# Patient Record
Sex: MALE | Race: WHITE
[De-identification: names, ages, dates, MRNs, and addresses within clinical notes are randomized per-mention and may not be internally consistent; named-entity substitution may affect disease eponyms.]

---

## 2017-09-17 ENCOUNTER — TRANSCRIPTION ENCOUNTER (OUTPATIENT)
Age: 27
End: 2017-09-17

## 2018-02-06 ENCOUNTER — INPATIENT (INPATIENT)
Facility: HOSPITAL | Age: 28
LOS: 1 days | Discharge: HOME | End: 2018-02-08
Attending: EMERGENCY MEDICINE | Admitting: EMERGENCY MEDICINE
Payer: COMMERCIAL

## 2018-02-06 VITALS
SYSTOLIC BLOOD PRESSURE: 181 MMHG | TEMPERATURE: 98 F | DIASTOLIC BLOOD PRESSURE: 114 MMHG | HEART RATE: 116 BPM | OXYGEN SATURATION: 100 % | RESPIRATION RATE: 17 BRPM | HEIGHT: 71 IN | WEIGHT: 184.97 LBS

## 2018-02-06 LAB
ALBUMIN SERPL ELPH-MCNC: 4.3 G/DL — SIGNIFICANT CHANGE UP (ref 3.3–5)
ALP SERPL-CCNC: 111 U/L — SIGNIFICANT CHANGE UP (ref 40–120)
ALT FLD-CCNC: 20 U/L — SIGNIFICANT CHANGE UP (ref 12–78)
ANION GAP SERPL CALC-SCNC: 12 MMOL/L — SIGNIFICANT CHANGE UP (ref 5–17)
APAP SERPL-MCNC: < 2 UG/ML (ref 10–30)
AST SERPL-CCNC: 16 U/L — SIGNIFICANT CHANGE UP (ref 15–37)
BASOPHILS # BLD AUTO: 0 K/UL — SIGNIFICANT CHANGE UP (ref 0–0.2)
BASOPHILS NFR BLD AUTO: 0.4 % — SIGNIFICANT CHANGE UP (ref 0–2)
BILIRUB SERPL-MCNC: 0.2 MG/DL — SIGNIFICANT CHANGE UP (ref 0.2–1.2)
BUN SERPL-MCNC: 12 MG/DL — SIGNIFICANT CHANGE UP (ref 7–23)
CALCIUM SERPL-MCNC: 9.2 MG/DL — SIGNIFICANT CHANGE UP (ref 8.5–10.1)
CHLORIDE SERPL-SCNC: 102 MMOL/L — SIGNIFICANT CHANGE UP (ref 96–108)
CO2 SERPL-SCNC: 23 MMOL/L — SIGNIFICANT CHANGE UP (ref 22–31)
CREAT SERPL-MCNC: 1.14 MG/DL — SIGNIFICANT CHANGE UP (ref 0.5–1.3)
EOSINOPHIL # BLD AUTO: 0 K/UL — SIGNIFICANT CHANGE UP (ref 0–0.5)
EOSINOPHIL NFR BLD AUTO: 0.4 % — SIGNIFICANT CHANGE UP (ref 0–6)
ETHANOL SERPL-MCNC: <10 MG/DL — SIGNIFICANT CHANGE UP (ref 0–10)
GLUCOSE SERPL-MCNC: 126 MG/DL — HIGH (ref 70–99)
HCT VFR BLD CALC: 41.4 % — SIGNIFICANT CHANGE UP (ref 39–50)
HGB BLD-MCNC: 14.3 G/DL — SIGNIFICANT CHANGE UP (ref 13–17)
LYMPHOCYTES # BLD AUTO: 1.9 K/UL — SIGNIFICANT CHANGE UP (ref 1–3.3)
LYMPHOCYTES # BLD AUTO: 14 % — SIGNIFICANT CHANGE UP (ref 13–44)
MCHC RBC-ENTMCNC: 30.5 PG — SIGNIFICANT CHANGE UP (ref 27–34)
MCHC RBC-ENTMCNC: 34.6 GM/DL — SIGNIFICANT CHANGE UP (ref 32–36)
MCV RBC AUTO: 88.1 FL — SIGNIFICANT CHANGE UP (ref 80–100)
MONOCYTES # BLD AUTO: 1 K/UL — HIGH (ref 0–0.9)
MONOCYTES NFR BLD AUTO: 7.6 % — SIGNIFICANT CHANGE UP (ref 2–14)
NEUTROPHILS # BLD AUTO: 10.8 K/UL — HIGH (ref 1.8–7.4)
NEUTROPHILS NFR BLD AUTO: 77.8 % — HIGH (ref 43–77)
PLATELET # BLD AUTO: 345 K/UL — SIGNIFICANT CHANGE UP (ref 150–400)
POTASSIUM SERPL-MCNC: 3.5 MMOL/L — SIGNIFICANT CHANGE UP (ref 3.5–5.3)
POTASSIUM SERPL-SCNC: 3.5 MMOL/L — SIGNIFICANT CHANGE UP (ref 3.5–5.3)
PROT SERPL-MCNC: 7.9 GM/DL — SIGNIFICANT CHANGE UP (ref 6–8.3)
RBC # BLD: 4.7 M/UL — SIGNIFICANT CHANGE UP (ref 4.2–5.8)
RBC # FLD: 11.2 % — SIGNIFICANT CHANGE UP (ref 10.3–14.5)
SALICYLATES SERPL-MCNC: <1.7 MG/DL (ref 2.8–20)
SODIUM SERPL-SCNC: 137 MMOL/L — SIGNIFICANT CHANGE UP (ref 135–145)
WBC # BLD: 13.8 K/UL — HIGH (ref 3.8–10.5)
WBC # FLD AUTO: 13.8 K/UL — HIGH (ref 3.8–10.5)

## 2018-02-06 PROCEDURE — 93010 ELECTROCARDIOGRAM REPORT: CPT | Mod: 76

## 2018-02-06 RX ORDER — METOCLOPRAMIDE HCL 10 MG
10 TABLET ORAL ONCE
Qty: 0 | Refills: 0 | Status: COMPLETED | OUTPATIENT
Start: 2018-02-06 | End: 2018-02-06

## 2018-02-06 RX ORDER — SODIUM CHLORIDE 9 MG/ML
1000 INJECTION INTRAMUSCULAR; INTRAVENOUS; SUBCUTANEOUS ONCE
Qty: 0 | Refills: 0 | Status: COMPLETED | OUTPATIENT
Start: 2018-02-06 | End: 2018-02-06

## 2018-02-06 RX ORDER — ONDANSETRON 8 MG/1
4 TABLET, FILM COATED ORAL ONCE
Qty: 0 | Refills: 0 | Status: COMPLETED | OUTPATIENT
Start: 2018-02-06 | End: 2018-02-06

## 2018-02-06 RX ADMIN — SODIUM CHLORIDE 1000 MILLILITER(S): 9 INJECTION INTRAMUSCULAR; INTRAVENOUS; SUBCUTANEOUS at 21:31

## 2018-02-06 RX ADMIN — ONDANSETRON 4 MILLIGRAM(S): 8 TABLET, FILM COATED ORAL at 20:15

## 2018-02-06 NOTE — ED PROVIDER NOTE - MEDICAL DECISION MAKING DETAILS
Pt presenting s/p ingesting too much psych medication. Will consult toxicology, labs, EKG and psych consult.

## 2018-02-06 NOTE — ED ADULT NURSE NOTE - OBJECTIVE STATEMENT
pt missed morning dose of wellbutrin and took both doses at noon. pt then started feeling lightheaded and dizzy, denies LOC, fall.

## 2018-02-06 NOTE — ED PROVIDER NOTE - PROGRESS NOTE DETAILS
Adrienne SP: Dr. Buchanan spoke with Cristal, she notes to observe pt for 6 hours and repeat EKG for QRS prolongation. Adrienne SP: Dr. Buchanan spoke with Judit, she notes to observe pt for 6 hours and repeat EKG for QRS prolongation. AJM: pts mother notes he now admits to taking larger amount of the same pills than initially described. No other ingestions. Spoke with tox who notes pt should be observed until no longer ataxic. likely several hours. case presented to tele psych. Pt states he took about 10 tabs of lamictal 150mg, 10 tabs of wellbutrin xl and "a bunch" of zoloft 50mg tabs. called toxicology, called telepsych with this update EBER Shaikh DO

## 2018-02-06 NOTE — ED ADULT TRIAGE NOTE - CHIEF COMPLAINT QUOTE
took double dose of his lamictal this afternoon because he missed his dose this morning. hx of bipolar and depression. denies SI/HI

## 2018-02-06 NOTE — ED PROVIDER NOTE - OBJECTIVE STATEMENT
26 y/o male with PMHx of seasonal bipolar, depression, retinoid detachment surgery presents to the ED s/p overdose at 5:30 pm on 2x 300mg buproprion XL and 2 x sertraline 100mg. As per pt he forgot to take his morning dose of medication, and figured he would just double up to make up for it. Pt's mother states pt's father  a few years ago and they have been going through a lot since then. Mother also claims pt has been more anxious than usual recently. 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  +lightheaded +Nausea. Pt has never taken too much medication before. Previous SI but none recently. Break up in 2017 that he still gets upset about. Denies CP, abd pain, VD,fever.

## 2018-02-06 NOTE — ED PROVIDER NOTE - NEUROLOGICAL, MLM
Alert and oriented, no focal deficits, no motor or sensory deficits. Alert and oriented, no focal deficits, no motor or sensory deficits. Cn 2-12 intact. ataxic gait

## 2018-02-07 LAB
AMPHET UR-MCNC: NEGATIVE — SIGNIFICANT CHANGE UP
ANION GAP SERPL CALC-SCNC: 8 MMOL/L — SIGNIFICANT CHANGE UP (ref 5–17)
APPEARANCE UR: CLEAR — SIGNIFICANT CHANGE UP
BARBITURATES UR SCN-MCNC: NEGATIVE — SIGNIFICANT CHANGE UP
BASOPHILS # BLD AUTO: 0.1 K/UL — SIGNIFICANT CHANGE UP (ref 0–0.2)
BASOPHILS NFR BLD AUTO: 0.5 % — SIGNIFICANT CHANGE UP (ref 0–2)
BENZODIAZ UR-MCNC: NEGATIVE — SIGNIFICANT CHANGE UP
BILIRUB UR-MCNC: NEGATIVE — SIGNIFICANT CHANGE UP
BUN SERPL-MCNC: 8 MG/DL — SIGNIFICANT CHANGE UP (ref 7–23)
CALCIUM SERPL-MCNC: 8.6 MG/DL — SIGNIFICANT CHANGE UP (ref 8.5–10.1)
CHLORIDE SERPL-SCNC: 98 MMOL/L — SIGNIFICANT CHANGE UP (ref 96–108)
CK SERPL-CCNC: 133 U/L — SIGNIFICANT CHANGE UP (ref 26–308)
CO2 SERPL-SCNC: 26 MMOL/L — SIGNIFICANT CHANGE UP (ref 22–31)
COCAINE METAB.OTHER UR-MCNC: NEGATIVE — SIGNIFICANT CHANGE UP
COLOR SPEC: YELLOW — SIGNIFICANT CHANGE UP
CREAT SERPL-MCNC: 0.8 MG/DL — SIGNIFICANT CHANGE UP (ref 0.5–1.3)
DIFF PNL FLD: NEGATIVE — SIGNIFICANT CHANGE UP
EOSINOPHIL # BLD AUTO: 0 K/UL — SIGNIFICANT CHANGE UP (ref 0–0.5)
EOSINOPHIL NFR BLD AUTO: 0.1 % — SIGNIFICANT CHANGE UP (ref 0–6)
GLUCOSE SERPL-MCNC: 108 MG/DL — HIGH (ref 70–99)
GLUCOSE UR QL: NEGATIVE MG/DL — SIGNIFICANT CHANGE UP
HCT VFR BLD CALC: 38 % — LOW (ref 39–50)
HGB BLD-MCNC: 13.1 G/DL — SIGNIFICANT CHANGE UP (ref 13–17)
KETONES UR-MCNC: NEGATIVE — SIGNIFICANT CHANGE UP
LEUKOCYTE ESTERASE UR-ACNC: NEGATIVE — SIGNIFICANT CHANGE UP
LYMPHOCYTES # BLD AUTO: 1.6 K/UL — SIGNIFICANT CHANGE UP (ref 1–3.3)
LYMPHOCYTES # BLD AUTO: 14 % — SIGNIFICANT CHANGE UP (ref 13–44)
MAGNESIUM SERPL-MCNC: 1.6 MG/DL — SIGNIFICANT CHANGE UP (ref 1.6–2.6)
MCHC RBC-ENTMCNC: 30.7 PG — SIGNIFICANT CHANGE UP (ref 27–34)
MCHC RBC-ENTMCNC: 34.6 GM/DL — SIGNIFICANT CHANGE UP (ref 32–36)
MCV RBC AUTO: 88.7 FL — SIGNIFICANT CHANGE UP (ref 80–100)
METHADONE UR-MCNC: NEGATIVE — SIGNIFICANT CHANGE UP
MONOCYTES # BLD AUTO: 0.7 K/UL — SIGNIFICANT CHANGE UP (ref 0–0.9)
MONOCYTES NFR BLD AUTO: 6.6 % — SIGNIFICANT CHANGE UP (ref 2–14)
NEUTROPHILS # BLD AUTO: 8.7 K/UL — HIGH (ref 1.8–7.4)
NEUTROPHILS NFR BLD AUTO: 78.7 % — HIGH (ref 43–77)
NITRITE UR-MCNC: NEGATIVE — SIGNIFICANT CHANGE UP
OPIATES UR-MCNC: NEGATIVE — SIGNIFICANT CHANGE UP
PCP SPEC-MCNC: SIGNIFICANT CHANGE UP
PCP UR-MCNC: NEGATIVE — SIGNIFICANT CHANGE UP
PH UR: 7 — SIGNIFICANT CHANGE UP (ref 5–8)
PHOSPHATE SERPL-MCNC: 3 MG/DL — SIGNIFICANT CHANGE UP (ref 2.5–4.5)
PLATELET # BLD AUTO: 279 K/UL — SIGNIFICANT CHANGE UP (ref 150–400)
POTASSIUM SERPL-MCNC: 3.5 MMOL/L — SIGNIFICANT CHANGE UP (ref 3.5–5.3)
POTASSIUM SERPL-SCNC: 3.5 MMOL/L — SIGNIFICANT CHANGE UP (ref 3.5–5.3)
PROT UR-MCNC: NEGATIVE MG/DL — SIGNIFICANT CHANGE UP
RBC # BLD: 4.28 M/UL — SIGNIFICANT CHANGE UP (ref 4.2–5.8)
RBC # FLD: 11.1 % — SIGNIFICANT CHANGE UP (ref 10.3–14.5)
SODIUM SERPL-SCNC: 132 MMOL/L — LOW (ref 135–145)
SP GR SPEC: 1.01 — SIGNIFICANT CHANGE UP (ref 1.01–1.02)
THC UR QL: NEGATIVE — SIGNIFICANT CHANGE UP
UROBILINOGEN FLD QL: NEGATIVE MG/DL — SIGNIFICANT CHANGE UP
WBC # BLD: 11.1 K/UL — HIGH (ref 3.8–10.5)
WBC # FLD AUTO: 11.1 K/UL — HIGH (ref 3.8–10.5)

## 2018-02-07 PROCEDURE — 99285 EMERGENCY DEPT VISIT HI MDM: CPT

## 2018-02-07 PROCEDURE — 93010 ELECTROCARDIOGRAM REPORT: CPT

## 2018-02-07 RX ORDER — SODIUM CHLORIDE 9 MG/ML
1000 INJECTION INTRAMUSCULAR; INTRAVENOUS; SUBCUTANEOUS ONCE
Qty: 0 | Refills: 0 | Status: COMPLETED | OUTPATIENT
Start: 2018-02-07 | End: 2018-02-07

## 2018-02-07 RX ORDER — SODIUM CHLORIDE 9 MG/ML
1000 INJECTION INTRAMUSCULAR; INTRAVENOUS; SUBCUTANEOUS
Qty: 0 | Refills: 0 | Status: DISCONTINUED | OUTPATIENT
Start: 2018-02-07 | End: 2018-02-08

## 2018-02-07 RX ADMIN — SODIUM CHLORIDE 100 MILLILITER(S): 9 INJECTION INTRAMUSCULAR; INTRAVENOUS; SUBCUTANEOUS at 07:45

## 2018-02-07 RX ADMIN — Medication 10 MILLIGRAM(S): at 01:09

## 2018-02-07 RX ADMIN — SODIUM CHLORIDE 100 MILLILITER(S): 9 INJECTION INTRAMUSCULAR; INTRAVENOUS; SUBCUTANEOUS at 07:46

## 2018-02-07 RX ADMIN — SODIUM CHLORIDE 100 MILLILITER(S): 9 INJECTION INTRAMUSCULAR; INTRAVENOUS; SUBCUTANEOUS at 07:13

## 2018-02-07 RX ADMIN — SODIUM CHLORIDE 1000 MILLILITER(S): 9 INJECTION INTRAMUSCULAR; INTRAVENOUS; SUBCUTANEOUS at 02:07

## 2018-02-07 NOTE — H&P ADULT - ASSESSMENT
26 yo M with PMH of bipolar disorder, depression, retinal detachment s/p surgery, p/w medication overdose    *Medication overdose  -Tele monitoring  -I/Os   -Utox  -RBBB on EKG - outpatient cardio eval  -Psych consult  -1:1  -IVF    *H/o bipolar + depression  -Will hold psych meds temporarily  -Psych consult      *DVT ppx  -SCDs

## 2018-02-07 NOTE — CONSULT NOTE ADULT - SUBJECTIVE AND OBJECTIVE BOX
HPI:  26 yo M with PMH of bipolar disorder, depression, retinal detachment s/p surgery, p/w medication overdose. Patient states she went through a break up 4 months ago, and has been depressed and doing things obsessively since then. He took too many medications today because he wanted to be in a different state of mind than the one he was in. denies suicidal / homicidal ideation    Patient took the following meds:  -wellbutrin 300mg x 6  -Lamictal 100mg x 7  -Zoloft 50mg x 8    Patient c/o HA at this time and unsteady on his feet when he gets up. Patient tried to urinate since coming to ED, but was unable to. Denies nausea / vomiting / abdominal pain / dry skin / CP / SOB.    ED physician called poison control who recommended 24 hour observation.     PSH: eye surgery  Social Hx: Denies tobacco, denies etoh, +h/o marijuana use, lives at home with mom and sister  Family hx: father - multiple myeloma (07 Feb 2018 05:28)    Psychiatry consulted for OD and depression    Upon interview pt is alert and oriented x3, calm, cooperative. Pt stated that yesterday was a difficult day and was feeling more down than usual, and as he was taking his regular doses he took several extra tabs of each his meds in hopes to escape his feelings for a while. He denies any suicidal ideation or intent, and stated that he knew that what he took was not enough to kill him. His mother called EMS when she found him looking "drugged." Pt admits to having a difficult time since break up with a feeling of purposelessness, amotivation, decreased energy, feeling tired, with no goals or passions in life. However, he is glad to be alive and continues to deny any suicidal ideation / intent / plan. Pt states that taking an OD was a mistake that he regrets, however he is feeling better today. No AH/VH/Paranoia. Bipolar history consistent with Bipolar II with episodes of hypomania, with excessive spending, causing debts that are an ongoing stressor.   Spoke to his sister Nicol who denies that pt was suicidal and denied any suicidal threats. She stated that they are looking for a new therapist as Ms Kwok was a not a good fit.  She feels he is safe to go home and agreed to monitor his meds along with his mother. Left message at [facility] for Dr Tereza Hurley not in today.           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         Review of Sx: denies ,  All other systems negative        Risk assessment : low- moderate, pt with history of SI, no past attempt, with ongoing depression without SI at this time    Mental Status Exam  Oriented x3 and to situation  General Appearance:  well nourished, average build, no deformities present, good grooming, good hygiene  Behavior: cooperative, good eye contact, well related  Muscle tone/ Strength: No abnormal movements  Gait/Station: not observed  Speech: normal volume and rate, spontaneous, normal articulation  Mood: anxious, depressed  Affect: congruent, constricted  Thought Process: wnl, linear, goal oriented, normal reasoning  Thought Associations: normal  Thought Content: wnl  Perceptions: wnl  Attention/Concentration: wnl  Recent Memory: wnl  Remote Memory: wnl  Fund of knowledge: wnl  Language: wnl, English speaking  Judgement : fair  Insight: good      Labs, EKG and imaging reviewed    Vital Signs Last 24 Hrs  T(C): 36.2 (07 Feb 2018 11:16), Max: 37.1 (07 Feb 2018 04:30)  T(F): 97.1 (07 Feb 2018 11:16), Max: 98.7 (07 Feb 2018 04:30)  HR: 108 (07 Feb 2018 11:16) (98 - 116)  BP: 146/91 (07 Feb 2018 11:16) (142/88 - 181/114)  BP(mean): --  RR: 17 (07 Feb 2018 11:16) (15 - 17)  SpO2: 97% (07 Feb 2018 11:16) (97% - 100%)    02-07    132<L>  |  98  |  8   ----------------------------<  108<H>  3.5   |  26  |  0.80    Ca    8.6      07 Feb 2018 06:10  Phos  3.0     02-07  Mg     1.6     02-07    TPro  7.9  /  Alb  4.3  /  TBili  0.2  /  DBili  x   /  AST  16  /  ALT  20  /  AlkPhos  111  02-06                        13.1   11.1  )-----------( 279      ( 07 Feb 2018 06:10 )             38.0   < from: 12 Lead ECG (02.07.18 @ 04:31) >  Ventricular Rate 102 BPM    Atrial Rate 102 BPM    P-R Interval 182 ms    QRS Duration 112 ms    Q-T Interval 350 ms    QTC Calculation(Bezet) 456 ms    P Axis 38 degrees    R Axis 66 degrees    T Axis 29 degrees    Diagnosis Line Sinus tachycardia  Incomplete right bundle branch block  Borderline ECG  When compared with ECG of 06-FEB-2018 21:36,  TN interval has decreased  ST elevation now present in Inferior leads  Confirmed by TRAV GRACE ([id number]) on 2/7/2018 11:50:08 AM    < end of copied text > HPI:  28 yo M with PMH of bipolar disorder, depression, retinal detachment s/p surgery, p/w medication overdose. Patient states she went through a break up 4 months ago, and has been depressed and doing things obsessively since then. He took too many medications today because he wanted to be in a different state of mind than the one he was in. denies suicidal / homicidal ideation    Patient took the following meds:  -wellbutrin 300mg x 6  -Lamictal 100mg x 7  -Zoloft 50mg x 8    Patient c/o HA at this time and unsteady on his feet when he gets up. Patient tried to urinate since coming to ED, but was unable to. Denies nausea / vomiting / abdominal pain / dry skin / CP / SOB.    ED physician called poison control who recommended 24 hour observation.     PSH: eye surgery  Social Hx: Denies tobacco, denies etoh, +h/o marijuana use, lives at home with mom and sister  Family hx: father - multiple myeloma (07 Feb 2018 05:28)    Psychiatry consulted for OD and depression    Upon interview pt is alert and oriented x3, calm, cooperative. Pt stated that yesterday was a difficult day and was feeling more down than usual, and as he was taking his regular doses he took several extra tabs of each his meds in hopes to escape his feelings for a while. He denies any suicidal ideation or intent, and stated that he knew that what he took was not enough to kill him. His mother called EMS when she found him looking "drugged." Pt admits to having a difficult time since break up with a feeling of purposelessness, amotivation, decreased energy, feeling tired, with no goals or passions in life. However, he is glad to be alive and continues to deny any suicidal ideation / intent / plan. Pt states that taking an OD was a mistake that he regrets, however he is feeling better today. No AH/VH/Paranoia. Bipolar history consistent with Bipolar II with episodes of hypomania, with excessive spending, causing debts that are an ongoing stressor.   Spoke to his sister Nicol who denies that pt was suicidal and denied any suicidal threats. She stated that they are looking for a new therapist as Ms Kwok was a not a good fit.  She feels he is safe to go home and agreed to monitor his meds along with his mother. Left message at [facility] for Dr Tereza Hurley not in today.    Collateral from Ms Kwok, his therapist   Ms Sundar, states that pt has been having passive SI since December and that he has been seeing suicide as an easy way out. Pt also has been researching ways online to end lis life, though not expressing any intent. Therapist confirms that Diagnosis is Bipolar I on records, and that pt has been declining ever since the breakup as pt stated to writer. Subject of voluntary hospitalization has been raised with pt but he declined. Placed call to mother Kika Solorzano at [phone].          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, 2010 [place], voluntary when pt feels is depressed enough  Past rehab: none  Past suicidality/aggression: pt had SI in past without any intent or attempt.         Review of Sx: denies ,  All other systems negative        Risk assessment : low- moderate, pt with history of SI, no past attempt, with ongoing depression without SI at this time    Mental Status Exam  Oriented x3 and to situation  General Appearance:  well nourished, average build, no deformities present, good grooming, good hygiene  Behavior: cooperative, good eye contact, well related  Muscle tone/ Strength: No abnormal movements  Gait/Station: not observed  Speech: normal volume and rate, spontaneous, normal articulation  Mood: anxious, depressed  Affect: congruent, constricted  Thought Process: wnl, linear, goal oriented, normal reasoning  Thought Associations: normal  Thought Content: wnl  Perceptions: wnl  Attention/Concentration: wnl  Recent Memory: wnl  Remote Memory: wnl  Fund of knowledge: [facility]  Language: wnl, English speaking  Judgement : fair  Insight: good      Labs, EKG and imaging reviewed    Vital Signs Last 24 Hrs  T(C): 36.2 (07 Feb 2018 11:16), Max: 37.1 (07 Feb 2018 04:30)  T(F): 97.1 (07 Feb 2018 11:16), Max: 98.7 (07 Feb 2018 04:30)  HR: 108 (07 Feb 2018 11:16) (98 - 116)  BP: 146/91 (07 Feb 2018 11:16) (142/88 - 181/114)  BP(mean): --  RR: 17 (07 Feb 2018 11:16) (15 - 17)  SpO2: 97% (07 Feb 2018 11:16) (97% - 100%)    02-07    132<L>  |  98  |  8   ----------------------------<  108<H>  3.5   |  26  |  0.80    Ca    8.6      07 Feb 2018 06:10  Phos  3.0     02-07  Mg     1.6     02-07    TPro  7.9  /  Alb  4.3  /  TBili  0.2  /  DBili  x   /  AST  16  /  ALT  20  /  AlkPhos  111  02-06                        13.1   11.1  )-----------( 279      ( 07 Feb 2018 06:10 )             38.0   < from: 12 Lead ECG (02.07.18 @ 04:31) >  Ventricular Rate 102 BPM    Atrial Rate 102 BPM    P-R Interval 182 ms    QRS Duration 112 ms    Q-T Interval 350 ms    QTC Calculation(Bezet) 456 ms    P Axis 38 degrees    R Axis 66 degrees    T Axis 29 degrees    Diagnosis Line Sinus tachycardia  Incomplete right bundle branch block  Borderline ECG  When compared with ECG of 06-FEB-2018 21:36,  CA interval has decreased  ST elevation now present in Inferior leads  Confirmed by TRAV GRACE ([id number]) on 2/7/2018 11:50:08 AM    < end of copied text > HPI:  26 yo M with PMH of bipolar disorder, depression, retinal detachment s/p surgery, p/w medication overdose. Patient states she went through a break up 4 months ago, and has been depressed and doing things obsessively since then. He took too many medications today because he wanted to be in a different state of mind than the one he was in. denies suicidal / homicidal ideation    Patient took the following meds:  -wellbutrin 300mg x 6  -Lamictal 100mg x 7  -Zoloft 50mg x 8    Patient c/o HA at this time and unsteady on his feet when he gets up. Patient tried to urinate since coming to ED, but was unable to. Denies nausea / vomiting / abdominal pain / dry skin / CP / SOB.    ED physician called poison control who recommended 24 hour observation.     PSH: eye surgery  Social Hx: Denies tobacco, denies etoh, +h/o marijuana use, lives at home with mom and sister  Family hx: father - multiple myeloma (07 Feb 2018 05:28)    Psychiatry consulted for OD and depression    Upon interview pt is alert and oriented x3, calm, cooperative. Pt stated that yesterday was a difficult day and was feeling more down than usual, and as he was taking his regular doses he took several extra tabs of each his meds in hopes to escape his feelings for a while. He denies any suicidal ideation or intent, and stated that he knew that what he took was not enough to kill him. His mother called EMS when she found him looking "drugged." Pt admits to having a difficult time since break up with a feeling of purposelessness, amotivation, decreased energy, feeling tired, with no goals or passions in life. However, he is glad to be alive and continues to deny any suicidal ideation / intent / plan. Pt states that taking an OD was a mistake that he regrets, however he is feeling better today. No AH/VH/Paranoia. Bipolar history consistent with Bipolar II with episodes of hypomania, with excessive spending, causing debts that are an ongoing stressor.   Spoke to his sister Nicol who denies that pt was suicidal and denied any suicidal threats. She stated that they are looking for a new therapist as Ms Kwok was a not a good fit.  She feels he is safe to go home and agreed to monitor his meds along with his mother. Left message at [facility] for Marilou Kwok, Dr Starks not in today.    Collateral from Ms Kwok, his therapist   Ms Sundar, states that pt has been having passive SI since December and that he has been seeing suicide as an easy way out. Pt also has been researching suicide online, though not expressing any intent. Therapist confirms that Diagnosis is Bipolar I on records, and that pt has been declining ever since the breakup as pt stated to writer. Subject of voluntary hospitalization has been raised with pt but he declined. Placed call to mother Kika Solorzano at [phone].          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         Review of Sx: denies ,  All other systems negative        Risk assessment : low- moderate, pt with history of SI, no past attempt, with ongoing depression without SI at this time    Mental Status Exam  Oriented x3 and to situation  General Appearance:  well nourished, average build, no deformities present, good grooming, good hygiene  Behavior: cooperative, good eye contact, well related  Muscle tone/ Strength: No abnormal movements  Gait/Station: not observed  Speech: normal volume and rate, spontaneous, normal articulation  Mood: anxious, depressed  Affect: congruent, constricted  Thought Process: wnl, linear, goal oriented, normal reasoning  Thought Associations: normal  Thought Content: wnl  Perceptions: wnl  Attention/Concentration: wnl  Recent Memory: wnl  Remote Memory: wnl  Fund of knowledge: [facility]  Language: wnl, English speaking  Judgement : fair  Insight: good      Labs, EKG and imaging reviewed    Vital Signs Last 24 Hrs  T(C): 36.2 (07 Feb 2018 11:16), Max: 37.1 (07 Feb 2018 04:30)  T(F): 97.1 (07 Feb 2018 11:16), Max: 98.7 (07 Feb 2018 04:30)  HR: 108 (07 Feb 2018 11:16) (98 - 116)  BP: 146/91 (07 Feb 2018 11:16) (142/88 - 181/114)  BP(mean): --  RR: 17 (07 Feb 2018 11:16) (15 - 17)  SpO2: 97% (07 Feb 2018 11:16) (97% - 100%)    02-07    132<L>  |  98  |  8   ----------------------------<  108<H>  3.5   |  26  |  0.80    Ca    8.6      07 Feb 2018 06:10  Phos  3.0     02-07  Mg     1.6     02-07    TPro  7.9  /  Alb  4.3  /  TBili  0.2  /  DBili  x   /  AST  16  /  ALT  20  /  AlkPhos  111  02-06                        13.1   11.1  )-----------( 279      ( 07 Feb 2018 06:10 )             38.0   < from: 12 Lead ECG (02.07.18 @ 04:31) >  Ventricular Rate 102 BPM    Atrial Rate 102 BPM    P-R Interval 182 ms    QRS Duration 112 ms    Q-T Interval 350 ms    QTC Calculation(Bezet) 456 ms    P Axis 38 degrees    R Axis 66 degrees    T Axis 29 degrees    Diagnosis Line Sinus tachycardia  Incomplete right bundle branch block  Borderline ECG  When compared with ECG of 06-FEB-2018 21:36,  CT interval has decreased  ST elevation now present in Inferior leads  Confirmed by TRAV GRACE ([id number]) on 2/7/2018 11:50:08 AM    < end of copied text >

## 2018-02-07 NOTE — CONSULT NOTE ADULT - ASSESSMENT
28 yo M with PMH of bipolar disorder, depression, retinal detachment s/p surgery, p/w medication overdose. Patient states she went through a break up 4 months ago, and has been depressed and doing things obsessively since then. He took too many medications today because he wanted to be in a different state of mind than the one he was in. Denies suicidal / homicidal ideation      Impression    Bipolar II depression with OD to feel better, no suicidal intent. futuristic thinking in wanting to get his debts settled and going back to Dr Starks for his psychiatric sx and working in therapy on establishing life goals.     Recommend    Pt offered voluntary admission to [facility] but pt declined. Sister also states that his previous hospitalizations were initiated by him and that he generally knows when he needs to go in.   Sister and mother to monitor his meds and help finding a new therapist  May resume meds in am if QTc remains wnl.   Follow up with pt's psychiatric provider as an outpatient as soon as possible.   Pt is psychiatrically cleared for d/c once medically cleared. 26 yo M with PMH of bipolar disorder, depression, retinal detachment s/p surgery, p/w medication overdose. Patient states she went through a break up 4 months ago, and has been depressed and doing things obsessively since then. He took too many medications today because he wanted to be in a different state of mind than the one he was in. Denies suicidal / homicidal ideation      Impression    Bipolar II depression with OD to feel better, denies suicidal intent. futuristic thinking in wanting to get his debts settled and going back to Dr Starks for his psychiatric sx and working in therapy on establishing life goals. However, therapist states that pt with passive SI during last 2 months.     Recommend    Pt offered voluntary admission to [facility] but pt declined. Sister also states that his previous hospitalizations were initiated by him and that he generally knows when he needs to go in.   Sister and mother to monitor his meds and help finding a new therapist  May resume meds in am if QTc remains wnl.   Follow up with pt's psychiatric provider as an outpatient as soon as possible vs inpt hospitalization, awaiting collateral from mother.  Pt to be reevaluated by psych in am. 28 yo M with PMH of bipolar disorder, depression, retinal detachment s/p surgery, p/w medication overdose. Patient states she went through a break up 4 months ago, and has been depressed and doing things obsessively since then. He took too many medications today because he wanted to be in a different state of mind than the one he was in. Denies suicidal / homicidal ideation      Impression    Bipolar II depression with OD to feel better, denies suicidal intent. futuristic thinking in wanting to get his debts settled and going back to Dr Starks for his psychiatric sx and working in therapy on establishing life goals. However, therapist states that pt with passive SI during last 2 months.     Recommend    Pt offered voluntary admission to [facility] but pt declined. Sister also states that his previous hospitalizations were initiated by him and that he generally knows when he needs to go in.   Sister and mother to monitor his meds and help finding a new therapist  May resume meds in am if QTc remains wnl.   Follow up with pt's psychiatric provider as an outpatient as soon as possible vs inpt hospitalization, awaiting collateral from mother and Dr Starks.  Pt to be reevaluated by psych in am.

## 2018-02-07 NOTE — H&P ADULT - HISTORY OF PRESENT ILLNESS
28 yo M with PMH of bipolar disorder, depression, retinal detachment s/p surgery, p/w medication overdose. Patient states she went through a break up 4 months ago, and has been depressed and doing things obsessively since then. He took too many medications today because he wanted to be in a different state of mind than the one he was in. denies suicidal / homicidal ideation    Patient took the following meds:  -wellbutrin 300mg x 6  -Lamictal 100mg x 7  -Zoloft 50mg x 8    Patient c/o HA at this time and unsteady on his feet when he gets up. Patient tried to urinate since coming to ED, but was unable to. Denies nausea / vomiting / abdominal pain / dry skin / CP / SOB.    ED physician called poison control who recommended 24 hour observation.     PSH: eye surgery  Social Hx: Denies tobacco, denies etoh, +h/o marijuana use, lives at home with mom and sister  Family hx: father - multiple myeloma

## 2018-02-07 NOTE — PATIENT PROFILE ADULT. - MENTAL HEALTH CONDITIONS/SYMPTOMS, PROFILE
altered thought process/depression/took Wellbutrin-7 tablets, Lamicatal 8 tablets, Zoloft - 8 tablets

## 2018-02-07 NOTE — ED ADULT NURSE REASSESSMENT NOTE - NS ED NURSE REASSESS COMMENT FT1
Pt rec'd from [facility]. AxOx4. Constant observation in place. Cardiac monitoring in place, ST as per cardiac tech. Pt states he does not have suicidal thoughts but is feeling anxious. Educated the patient about reason for admission - pt verbalizes understanding. No acute distress at this time. Awaiting for bed assignment. Safety and comfort maintained.

## 2018-02-08 ENCOUNTER — TRANSCRIPTION ENCOUNTER (OUTPATIENT)
Age: 28
End: 2018-02-08

## 2018-02-08 VITALS
TEMPERATURE: 98 F | OXYGEN SATURATION: 100 % | SYSTOLIC BLOOD PRESSURE: 128 MMHG | RESPIRATION RATE: 18 BRPM | DIASTOLIC BLOOD PRESSURE: 72 MMHG | HEART RATE: 97 BPM

## 2018-02-08 LAB
ANION GAP SERPL CALC-SCNC: 8 MMOL/L — SIGNIFICANT CHANGE UP (ref 5–17)
BUN SERPL-MCNC: 10 MG/DL — SIGNIFICANT CHANGE UP (ref 7–23)
CALCIUM SERPL-MCNC: 9 MG/DL — SIGNIFICANT CHANGE UP (ref 8.5–10.1)
CHLORIDE SERPL-SCNC: 104 MMOL/L — SIGNIFICANT CHANGE UP (ref 96–108)
CO2 SERPL-SCNC: 26 MMOL/L — SIGNIFICANT CHANGE UP (ref 22–31)
CREAT SERPL-MCNC: 0.82 MG/DL — SIGNIFICANT CHANGE UP (ref 0.5–1.3)
GLUCOSE SERPL-MCNC: 89 MG/DL — SIGNIFICANT CHANGE UP (ref 70–99)
HCT VFR BLD CALC: 41.2 % — SIGNIFICANT CHANGE UP (ref 39–50)
HGB BLD-MCNC: 14.1 G/DL — SIGNIFICANT CHANGE UP (ref 13–17)
MCHC RBC-ENTMCNC: 30.7 PG — SIGNIFICANT CHANGE UP (ref 27–34)
MCHC RBC-ENTMCNC: 34.1 GM/DL — SIGNIFICANT CHANGE UP (ref 32–36)
MCV RBC AUTO: 90 FL — SIGNIFICANT CHANGE UP (ref 80–100)
PLATELET # BLD AUTO: 260 K/UL — SIGNIFICANT CHANGE UP (ref 150–400)
POTASSIUM SERPL-MCNC: 3.9 MMOL/L — SIGNIFICANT CHANGE UP (ref 3.5–5.3)
POTASSIUM SERPL-SCNC: 3.9 MMOL/L — SIGNIFICANT CHANGE UP (ref 3.5–5.3)
RBC # BLD: 4.58 M/UL — SIGNIFICANT CHANGE UP (ref 4.2–5.8)
RBC # FLD: 11.6 % — SIGNIFICANT CHANGE UP (ref 10.3–14.5)
SODIUM SERPL-SCNC: 138 MMOL/L — SIGNIFICANT CHANGE UP (ref 135–145)
WBC # BLD: 6.7 K/UL — SIGNIFICANT CHANGE UP (ref 3.8–10.5)
WBC # FLD AUTO: 6.7 K/UL — SIGNIFICANT CHANGE UP (ref 3.8–10.5)

## 2018-02-08 PROCEDURE — 93010 ELECTROCARDIOGRAM REPORT: CPT

## 2018-02-08 RX ADMIN — SODIUM CHLORIDE 100 MILLILITER(S): 9 INJECTION INTRAMUSCULAR; INTRAVENOUS; SUBCUTANEOUS at 05:58

## 2018-02-08 NOTE — DISCHARGE NOTE ADULT - HOSPITAL COURSE
26 yo M with PMH of bipolar disorder, depression, retinal detachment s/p surgery, p/w medication overdose. Patient states she went through a break up 4 months ago, and has been depressed and doing things obsessively since then. He took too many medications today because he wanted to be in a different state of mind than the one he was in. denies suicidal / homicidal ideation    Patient took the following meds:  -wellbutrin 300mg x 6  -Lamictal 100mg x 7  -Zoloft 50mg x 8        Vital Signs Last 24 Hrs  T(C): 36.6 (08 Feb 2018 09:44), Max: 36.7 (07 Feb 2018 16:07)  T(F): 97.8 (08 Feb 2018 09:44), Max: 98.1 (07 Feb 2018 17:45)  HR: 76 (08 Feb 2018 09:44) (74 - 101)  BP: 140/76 (08 Feb 2018 09:44) (136/84 - 150/86)  BP(mean): --  RR: 18 (08 Feb 2018 09:44) (16 - 18)  SpO2: 94% (08 Feb 2018 09:44) (94% - 98%)    Physical Exam:  · Constitutional	Well-developed, well nourished	  · Eyes	EOMI; PERRL; no drainage or redness	  · ENMT	No oral lesions; no gross abnormalities	  · Respiratory	Breath Sounds equal & clear to percussion & auscultation, no accessory muscle use	  · Cardiovascular	detailed exam	  · Cardiovascular Details	no rub  no murmur	  · Cardiovascular Details	tachycardia; positive S1; positive S2	  · Gastrointestinal	Soft, non-tender, no hepatosplenomegaly, normal bowel sounds	  · Extremities	No cyanosis, clubbing or edema	  · Neurological	Alert & oriented; no sensory, motor or coordination deficits, normal reflexes	  · Skin	No lesions; no rash	  · Musculoskeletal	No joint pain, swelling or deformity; no limitation of movement	  · Psychiatric	Affect and characteristics of appearance, verbalizations, behaviors are appropriate	        Assessment and Plan:   Assessment:  · Assessment		  26 yo M with PMH of bipolar disorder, depression, retinal detachment s/p surgery, p/w medication overdose    *Medication overdose  -Tele monitoring, no arrhythmias  -Psych consult and follow up appreciated  -On 1:1, can d/c as per psych  -Repeat 12 lead EKG, if QTc normal can restart psych meds    *H/o bipolar + depression  -Restart psych meds    Cleared by psych to discharge.  Discussed with patient in length regarding d/c plan.   Spent more than 30 minutes to prepare the discharge.

## 2018-02-08 NOTE — PROGRESS NOTE ADULT - SUBJECTIVE AND OBJECTIVE BOX
HPI:  28 yo M with PMH of bipolar disorder, depression, retinal detachment s/p surgery, p/w medication overdose. Patient states she went through a break up 4 months ago, and has been depressed and doing things obsessively since then. He took too many medications today because he wanted to be in a different state of mind than the one he was in. denies suicidal / homicidal ideation    Pt today with improved mood, has been social with his 1:1 , continues to deny that this was a suicide attempt,  he did elaborate today that he was seeking a high and did not want to take etoh or MJ.   Spoke to mother who also denies any SI at home. She also believes he is "heartbroken" from the breakup with his girlfriend, but that there has been no suicidal behavior at home.   Pt explains that passive SI that Ms Kwok has raised concerns about were due to Effexor and that these passive SI have subsided once stopped. Pt currently denies suicidal ideation, plan or intent, with brighter affect and futuristic thinking. Sister is also back at home to support him along with his mother. Pt today declined a voluntary 5N hospitalization and has apt with his therapist tomorrow.         Review of Sx: denies ,  All other systems negative        Risk assessment : low- moderate, pt with history of SI, no past attempt, with ongoing depression without SI at this time    Mental Status Exam  Oriented x3 and to situation  General Appearance:  well nourished, average build, no deformities present, good grooming, good hygiene  Behavior: cooperative, fair eye contact, well related  Muscle tone/ Strength: No abnormal movements  Gait/Station: not observed  Speech: normal volume and rate, spontaneous, normal articulation  Mood: improved, brighter, euthymic  Affect: congruent but remains constricted   Thought Process: wnl, linear, goal oriented, normal reasoning  Thought Associations: normal  Thought Content: wnl  Perceptions: wnl  Attention/Concentration: wnl  Recent Memory: wnl  Remote Memory: wnl  Fund of knowledge: wnl  Judgement : fair  Insight: good      Labs, EKG and imaging reviewed      Vital Signs Last 24 Hrs  T(C): 36.6 (08 Feb 2018 09:44), Max: 36.7 (07 Feb 2018 16:07)  T(F): 97.8 (08 Feb 2018 09:44), Max: 98.1 (07 Feb 2018 17:45)  HR: 76 (08 Feb 2018 09:44) (74 - 101)  BP: 140/76 (08 Feb 2018 09:44) (136/84 - 150/86)  BP(mean): --  RR: 18 (08 Feb 2018 09:44) (16 - 18)  SpO2: 94% (08 Feb 2018 09:44) (94% - 98%)Language: wnl, English speaking    02-08    138  |  104  |  10  ----------------------------<  89  3.9   |  26  |  0.82    Ca    9.0      08 Feb 2018 07:09  Phos  3.0     02-07  Mg     1.6     02-07    TPro  7.9  /  Alb  4.3  /  TBili  0.2  /  DBili  x   /  AST  16  /  ALT  20  /  AlkPhos  111  02-06                            14.1   6.7   )-----------( 260      ( 08 Feb 2018 07:09 )             41.2

## 2018-02-08 NOTE — DISCHARGE NOTE ADULT - MEDICATION SUMMARY - MEDICATIONS TO TAKE
I will START or STAY ON the medications listed below when I get home from the hospital:    LaMICtal 100 mg oral tablet  -- 1 tab(s) by mouth 2 times a day  -- Indication: For depression    Zoloft 50 mg oral tablet  -- 1 tab(s) by mouth once a day  -- Indication: For depression    Wellbutrin  mg/24 hours oral tablet, extended release  -- 1 tab(s) by mouth every 24 hours  -- Indication: For depression

## 2018-02-08 NOTE — DISCHARGE NOTE ADULT - CARE PLAN
Principal Discharge DX:	Drug overdose, undetermined intent, initial encounter  Goal:	prevent further attack  Assessment and plan of treatment:	Follow up with your psychiatrist and therapist

## 2018-02-08 NOTE — PROGRESS NOTE ADULT - ASSESSMENT
26 yo M with PMH of bipolar disorder, depression, retinal detachment s/p surgery, p/w medication overdose. Patient states she went through a break up 4 months ago, and has been depressed and doing things obsessively since then. He took too many medications today because he wanted to be in a different state of mind than the one he was in. Denies suicidal / homicidal ideation      Impression    Bipolar depression with OD to feel better with intent to get high, denies suicidal intent. futuristic thinking in wanting to work to get rid of his debts and going back to Dr Starks for his psychiatric sx and working in therapy on establishing life goals including improved social networks.      Recommend    Pt offered voluntary admission to  but pt declined. Sister also states that his previous hospitalizations were initiated by him and that he generally knows when he needs to go in.   Sister and mother  both deny signs of SI at home and in hospital, they will monitor his meds and help finding a new therapist  May resume psych meds in am if QTc remains wnl at following doses:  Lamictal 150mg BID, Zoloft 50 mg QD, Wellbutrin XL 300mg QD  Follow up with pt's psychiatric provider as an outpatient as soon as possible, Pt to see his therapist in AM.  D/c 1:1  Pt is psychiatrically cleared for d/c

## 2018-02-17 DIAGNOSIS — F31.9 BIPOLAR DISORDER, UNSPECIFIED: ICD-10-CM

## 2018-02-17 DIAGNOSIS — Z79.01 LONG TERM (CURRENT) USE OF ANTICOAGULANTS: ICD-10-CM

## 2018-02-17 DIAGNOSIS — T43.291A POISONING BY OTHER ANTIDEPRESSANTS, ACCIDENTAL (UNINTENTIONAL), INITIAL ENCOUNTER: ICD-10-CM

## 2018-02-17 DIAGNOSIS — Z63.0 PROBLEMS IN RELATIONSHIP WITH SPOUSE OR PARTNER: ICD-10-CM
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
## 2018-02-17 SDOH — SOCIAL STABILITY - SOCIAL INSECURITY: PROBLEMS IN RELATIONSHIP WITH SPOUSE OR PARTNER: Z63.0

## 2018-07-02 ENCOUNTER — EMERGENCY (EMERGENCY)
Facility: HOSPITAL | Age: 28
LOS: 0 days | Discharge: HOME | End: 2018-07-02
Attending: EMERGENCY MEDICINE | Admitting: EMERGENCY MEDICINE
Payer: COMMERCIAL

## 2018-07-02 VITALS
OXYGEN SATURATION: 100 % | HEART RATE: 66 BPM | HEIGHT: 71 IN | WEIGHT: 184.97 LBS | RESPIRATION RATE: 17 BRPM | SYSTOLIC BLOOD PRESSURE: 141 MMHG | TEMPERATURE: 99 F | DIASTOLIC BLOOD PRESSURE: 84 MMHG

## 2018-07-02 DIAGNOSIS — S01.112A LACERATION WITHOUT FOREIGN BODY OF LEFT EYELID AND PERIOCULAR AREA, INITIAL ENCOUNTER: ICD-10-CM
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
## 2018-07-02 PROCEDURE — 99283 EMERGENCY DEPT VISIT LOW MDM: CPT

## 2018-07-02 RX ORDER — ACETAMINOPHEN 500 MG
1000 TABLET ORAL ONCE
Qty: 0 | Refills: 0 | Status: COMPLETED | OUTPATIENT
Start: 2018-07-02 | End: 2018-07-02

## 2018-07-02 RX ADMIN — Medication 1000 MILLIGRAM(S): at 20:43

## 2018-07-02 NOTE — ED STATDOCS - PROGRESS NOTE DETAILS
28 yo M presents to ED after being hit in the face with a soccer ball, + laceration to L eyebrow from glasses.  Pt states it happened about 45 mins ago.  Pt c/o HA, denies LOC, neck pain, Tdap UTD.  PE: small vertical laceration to L eyebrow  Plan: tylenol, plastics for lac repair, spoke to Dr. Vazquez he will be in to see pt.  Milka Corrales PA-C wound closure done by Dr. Vazquez, suture after care instructions and precautions given by Dr. Vazquez, plan to d/c home with oupt f/u in 1 week for suture removal and wound check, pt agreeable to d/c and plan of care all questions answered  Milka Corrales PA-C

## 2018-07-02 NOTE — CONSULT NOTE ADULT - SUBJECTIVE AND OBJECTIVE BOX
· HPI: 28 y/o M presents to the ED s/p hit in face with soccer ball, laceration to left eyebrow from glasses ~ 45 minutes ago. Bleeding controlled. Pt with HA. Denies LOC. Tetanus up to date. Denies neck tenderness.	  PMH ; denies   PSH: denies     PE: 2 cm laceration transverse transecting eyebrow into medial and lateral aspect , deep with muscle exposed ,jagged skin edges

## 2018-07-02 NOTE — ED STATDOCS - ATTENDING CONTRIBUTION TO CARE
Attending Contribution to Care: I, Swetha Pichardo, performed the initial face to face bedside interview with this patient regarding history of present illness, review of symptoms and relevant past medical, social and family history.  I completed an independent physical examination.  I was the initial provider who evaluated this patient and the history, physical, and MDM reflect this intial assessment. I have signed out the follow up of any pending tests after the original (i.e. labs, radiological studies) to the ACP with instructions to review any with instructions to review any concerning findings to me prior to discharge.  I have communicated the patient’s plan of care and disposition with the ACP.

## 2018-07-02 NOTE — CONSULT NOTE ADULT - ASSESSMENT
Deep laceration left eyebrow with jugged skin edges transecting muscular layer  - repaired in ER , bacitracin applied , follow up in 1 week

## 2018-07-02 NOTE — ED ADULT NURSE NOTE - OBJECTIVE STATEMENT
pt presents to ED c/o left eye brow lac from getting hit in face with soccer ball with glasses on. denies dizziness. pt c/o slight blurriness in left eye.

## 2018-07-02 NOTE — ED STATDOCS - OBJECTIVE STATEMENT
26 y/o M presents to the ED s/p hit in face with soccer ball, laceration to left eyebrow from glasses ~ 45 minutes ago. Bleeding controlled. Pt with HA. Denies LOC. Tetanus up to date. Denies neck tenderness.

## 2018-07-02 NOTE — ED ADULT TRIAGE NOTE - CHIEF COMPLAINT QUOTE
s/p hit in face with soccer ball, laceration to left eyebrow from glasses noted, bleeding controlled. c/o HA, nausea, denies vomiting. c/o left eye blurry

## 2018-11-17 ENCOUNTER — INPATIENT (INPATIENT)
Facility: HOSPITAL | Age: 28
LOS: 2 days | Discharge: HOME | End: 2018-11-20
Attending: INTERNAL MEDICINE | Admitting: INTERNAL MEDICINE
Payer: COMMERCIAL

## 2018-11-17 VITALS — HEIGHT: 71 IN | WEIGHT: 199.96 LBS

## 2018-11-17 LAB
ALBUMIN SERPL ELPH-MCNC: 3.3 G/DL — SIGNIFICANT CHANGE UP (ref 3.3–5)
ALP SERPL-CCNC: 96 U/L — SIGNIFICANT CHANGE UP (ref 40–120)
ALT FLD-CCNC: 24 U/L — SIGNIFICANT CHANGE UP (ref 12–78)
ANION GAP SERPL CALC-SCNC: 9 MMOL/L — SIGNIFICANT CHANGE UP (ref 5–17)
APAP SERPL-MCNC: <2 UG/ML — LOW (ref 10–30)
AST SERPL-CCNC: 10 U/L — LOW (ref 15–37)
BASOPHILS # BLD AUTO: 0.03 K/UL — SIGNIFICANT CHANGE UP (ref 0–0.2)
BASOPHILS NFR BLD AUTO: 0.4 % — SIGNIFICANT CHANGE UP (ref 0–2)
BILIRUB SERPL-MCNC: 0.3 MG/DL — SIGNIFICANT CHANGE UP (ref 0.2–1.2)
BUN SERPL-MCNC: 8 MG/DL — SIGNIFICANT CHANGE UP (ref 7–23)
CALCIUM SERPL-MCNC: 8.8 MG/DL — SIGNIFICANT CHANGE UP (ref 8.5–10.1)
CHLORIDE SERPL-SCNC: 100 MMOL/L — SIGNIFICANT CHANGE UP (ref 96–108)
CK SERPL-CCNC: 47 U/L — SIGNIFICANT CHANGE UP (ref 26–308)
CO2 SERPL-SCNC: 26 MMOL/L — SIGNIFICANT CHANGE UP (ref 22–31)
CREAT SERPL-MCNC: 0.96 MG/DL — SIGNIFICANT CHANGE UP (ref 0.5–1.3)
D DIMER BLD IA.RAPID-MCNC: 452 NG/ML DDU — HIGH
EOSINOPHIL # BLD AUTO: 0.01 K/UL — SIGNIFICANT CHANGE UP (ref 0–0.5)
EOSINOPHIL NFR BLD AUTO: 0.1 % — SIGNIFICANT CHANGE UP (ref 0–6)
ETHANOL SERPL-MCNC: <10 MG/DL — SIGNIFICANT CHANGE UP (ref 0–10)
GLUCOSE SERPL-MCNC: 105 MG/DL — HIGH (ref 70–99)
HCT VFR BLD CALC: 38.3 % — LOW (ref 39–50)
HGB BLD-MCNC: 13.2 G/DL — SIGNIFICANT CHANGE UP (ref 13–17)
IMM GRANULOCYTES NFR BLD AUTO: 0.4 % — SIGNIFICANT CHANGE UP (ref 0–1.5)
LACTATE SERPL-SCNC: 1 MMOL/L — SIGNIFICANT CHANGE UP (ref 0.7–2)
LYMPHOCYTES # BLD AUTO: 0.82 K/UL — LOW (ref 1–3.3)
LYMPHOCYTES # BLD AUTO: 10 % — LOW (ref 13–44)
MAGNESIUM SERPL-MCNC: 2 MG/DL — SIGNIFICANT CHANGE UP (ref 1.6–2.6)
MCHC RBC-ENTMCNC: 29.9 PG — SIGNIFICANT CHANGE UP (ref 27–34)
MCHC RBC-ENTMCNC: 34.5 GM/DL — SIGNIFICANT CHANGE UP (ref 32–36)
MCV RBC AUTO: 86.7 FL — SIGNIFICANT CHANGE UP (ref 80–100)
MONOCYTES # BLD AUTO: 0.53 K/UL — SIGNIFICANT CHANGE UP (ref 0–0.9)
MONOCYTES NFR BLD AUTO: 6.5 % — SIGNIFICANT CHANGE UP (ref 2–14)
NEUTROPHILS # BLD AUTO: 6.77 K/UL — SIGNIFICANT CHANGE UP (ref 1.8–7.4)
NEUTROPHILS NFR BLD AUTO: 82.6 % — HIGH (ref 43–77)
NRBC # BLD: 0 /100 WBCS — SIGNIFICANT CHANGE UP (ref 0–0)
PLATELET # BLD AUTO: 283 K/UL — SIGNIFICANT CHANGE UP (ref 150–400)
POTASSIUM SERPL-MCNC: 4 MMOL/L — SIGNIFICANT CHANGE UP (ref 3.5–5.3)
POTASSIUM SERPL-SCNC: 4 MMOL/L — SIGNIFICANT CHANGE UP (ref 3.5–5.3)
PROT SERPL-MCNC: 8.2 GM/DL — SIGNIFICANT CHANGE UP (ref 6–8.3)
RAPID RVP RESULT: DETECTED
RBC # BLD: 4.42 M/UL — SIGNIFICANT CHANGE UP (ref 4.2–5.8)
RBC # FLD: 13 % — SIGNIFICANT CHANGE UP (ref 10.3–14.5)
RV+EV RNA SPEC QL NAA+PROBE: DETECTED
SALICYLATES SERPL-MCNC: <1.7 MG/DL — LOW (ref 2.8–20)
SODIUM SERPL-SCNC: 135 MMOL/L — SIGNIFICANT CHANGE UP (ref 135–145)
TROPONIN I SERPL-MCNC: <0.015 NG/ML — SIGNIFICANT CHANGE UP (ref 0.01–0.04)
WBC # BLD: 8.19 K/UL — SIGNIFICANT CHANGE UP (ref 3.8–10.5)
WBC # FLD AUTO: 8.19 K/UL — SIGNIFICANT CHANGE UP (ref 3.8–10.5)

## 2018-11-17 PROCEDURE — 71045 X-RAY EXAM CHEST 1 VIEW: CPT | Mod: 26

## 2018-11-17 PROCEDURE — 71275 CT ANGIOGRAPHY CHEST: CPT | Mod: 26

## 2018-11-17 RX ORDER — AZITHROMYCIN 500 MG/1
500 TABLET, FILM COATED ORAL ONCE
Qty: 0 | Refills: 0 | Status: COMPLETED | OUTPATIENT
Start: 2018-11-17 | End: 2018-11-17

## 2018-11-17 RX ORDER — IPRATROPIUM/ALBUTEROL SULFATE 18-103MCG
3 AEROSOL WITH ADAPTER (GRAM) INHALATION ONCE
Qty: 0 | Refills: 0 | Status: COMPLETED | OUTPATIENT
Start: 2018-11-17 | End: 2018-11-17

## 2018-11-17 RX ORDER — CEFTRIAXONE 500 MG/1
1 INJECTION, POWDER, FOR SOLUTION INTRAMUSCULAR; INTRAVENOUS ONCE
Qty: 0 | Refills: 0 | Status: DISCONTINUED | OUTPATIENT
Start: 2018-11-17 | End: 2018-11-17

## 2018-11-17 RX ORDER — SODIUM CHLORIDE 9 MG/ML
3000 INJECTION INTRAMUSCULAR; INTRAVENOUS; SUBCUTANEOUS ONCE
Qty: 0 | Refills: 0 | Status: COMPLETED | OUTPATIENT
Start: 2018-11-17 | End: 2018-11-17

## 2018-11-17 RX ORDER — IBUPROFEN 200 MG
600 TABLET ORAL ONCE
Qty: 0 | Refills: 0 | Status: COMPLETED | OUTPATIENT
Start: 2018-11-17 | End: 2018-11-17

## 2018-11-17 RX ORDER — FAMOTIDINE 10 MG/ML
20 INJECTION INTRAVENOUS ONCE
Qty: 0 | Refills: 0 | Status: COMPLETED | OUTPATIENT
Start: 2018-11-17 | End: 2018-11-17

## 2018-11-17 RX ORDER — CEFTRIAXONE 500 MG/1
1000 INJECTION, POWDER, FOR SOLUTION INTRAMUSCULAR; INTRAVENOUS ONCE
Qty: 0 | Refills: 0 | Status: COMPLETED | OUTPATIENT
Start: 2018-11-17 | End: 2018-11-17

## 2018-11-17 RX ADMIN — SODIUM CHLORIDE 3000 MILLILITER(S): 9 INJECTION INTRAMUSCULAR; INTRAVENOUS; SUBCUTANEOUS at 22:30

## 2018-11-17 RX ADMIN — AZITHROMYCIN 500 MILLIGRAM(S): 500 TABLET, FILM COATED ORAL at 23:49

## 2018-11-17 RX ADMIN — Medication 3 MILLILITER(S): at 21:43

## 2018-11-17 RX ADMIN — AZITHROMYCIN 255 MILLIGRAM(S): 500 TABLET, FILM COATED ORAL at 22:45

## 2018-11-17 RX ADMIN — SODIUM CHLORIDE 3000 MILLILITER(S): 9 INJECTION INTRAMUSCULAR; INTRAVENOUS; SUBCUTANEOUS at 21:25

## 2018-11-17 RX ADMIN — Medication 600 MILLIGRAM(S): at 21:43

## 2018-11-17 RX ADMIN — Medication 600 MILLIGRAM(S): at 22:49

## 2018-11-17 RX ADMIN — CEFTRIAXONE 1000 MILLIGRAM(S): 500 INJECTION, POWDER, FOR SOLUTION INTRAMUSCULAR; INTRAVENOUS at 22:45

## 2018-11-17 RX ADMIN — FAMOTIDINE 20 MILLIGRAM(S): 10 INJECTION INTRAVENOUS at 21:44

## 2018-11-17 NOTE — ED ADULT TRIAGE NOTE - CHIEF COMPLAINT QUOTE
flu like sx 5days get worsening  . taking to much medication been 2days  felt heart racing with diff breathing no asthma  +abd pain with nausea

## 2018-11-17 NOTE — ED STATDOCS - PROGRESS NOTE DETAILS
27 y/o male with a PMHx of anxiety, depression presents to the ED c/o difficulty breathing. Pt notes he has had flu like symptoms for the past x5 days. Since yesterday morning, pt took a bottle and a half of Nyquil. +SOB, +sweats, +cough, +diarrhea. No other complaints at this time. PE: cardiac: s1-s2 rrr, Lungs: mild crackles to left base of lungs. plan: cxr, labs, and reeval. -Rohini Singer PA-C

## 2018-11-17 NOTE — ED STATDOCS - ATTENDING CONTRIBUTION TO CARE
Attending Contribution to Care: I, Reva Sousa, performed the initial face to face bedside interview with this patient regarding history of present illness, review of symptoms and relevant past medical, social and family history.  I completed an independent physical examination.  I was the initial provider who evaluated this patient. I have signed out the follow up of any pending tests (i.e. labs, radiological studies) to the ACP.  I have communicated the patient’s plan of care and disposition with the ACP.

## 2018-11-17 NOTE — ED STATDOCS - OBJECTIVE STATEMENT
27 y/o male with a PMHx of anxiety, depression presents to the ED c/o difficulty breathing. Pt notes he has had flu like symptoms for the past x5 days. Since yesterday morning, pt took a bottle and a half of Nyquil. +SOB, +sweats, +cough, +diarrhea. No other complaints at this time.

## 2018-11-18 LAB
AMPHET UR-MCNC: NEGATIVE — SIGNIFICANT CHANGE UP
APPEARANCE UR: CLEAR — SIGNIFICANT CHANGE UP
BACTERIA # UR AUTO: ABNORMAL
BARBITURATES UR SCN-MCNC: NEGATIVE — SIGNIFICANT CHANGE UP
BENZODIAZ UR-MCNC: NEGATIVE — SIGNIFICANT CHANGE UP
BILIRUB UR-MCNC: NEGATIVE — SIGNIFICANT CHANGE UP
COCAINE METAB.OTHER UR-MCNC: NEGATIVE — SIGNIFICANT CHANGE UP
COLOR SPEC: YELLOW — SIGNIFICANT CHANGE UP
DIFF PNL FLD: NEGATIVE — SIGNIFICANT CHANGE UP
EPI CELLS # UR: SIGNIFICANT CHANGE UP
GLUCOSE UR QL: NEGATIVE MG/DL — SIGNIFICANT CHANGE UP
KETONES UR-MCNC: ABNORMAL
LEUKOCYTE ESTERASE UR-ACNC: ABNORMAL
METHADONE UR-MCNC: NEGATIVE — SIGNIFICANT CHANGE UP
NITRITE UR-MCNC: NEGATIVE — SIGNIFICANT CHANGE UP
OPIATES UR-MCNC: NEGATIVE — SIGNIFICANT CHANGE UP
PCP SPEC-MCNC: SIGNIFICANT CHANGE UP
PCP UR-MCNC: NEGATIVE — SIGNIFICANT CHANGE UP
PH UR: 6 — SIGNIFICANT CHANGE UP (ref 5–8)
PROT UR-MCNC: 30 MG/DL
RBC CASTS # UR COMP ASSIST: SIGNIFICANT CHANGE UP /HPF (ref 0–4)
SP GR SPEC: 1.01 — SIGNIFICANT CHANGE UP (ref 1.01–1.02)
THC UR QL: NEGATIVE — SIGNIFICANT CHANGE UP
UROBILINOGEN FLD QL: NEGATIVE MG/DL — SIGNIFICANT CHANGE UP
WBC UR QL: ABNORMAL

## 2018-11-18 PROCEDURE — 99285 EMERGENCY DEPT VISIT HI MDM: CPT

## 2018-11-18 RX ORDER — CEFTRIAXONE 500 MG/1
1000 INJECTION, POWDER, FOR SOLUTION INTRAMUSCULAR; INTRAVENOUS EVERY 24 HOURS
Qty: 0 | Refills: 0 | Status: DISCONTINUED | OUTPATIENT
Start: 2018-11-18 | End: 2018-11-20

## 2018-11-18 RX ORDER — FLUOXETINE HCL 10 MG
30 CAPSULE ORAL DAILY
Qty: 0 | Refills: 0 | Status: DISCONTINUED | OUTPATIENT
Start: 2018-11-18 | End: 2018-11-20

## 2018-11-18 RX ORDER — ENOXAPARIN SODIUM 100 MG/ML
40 INJECTION SUBCUTANEOUS EVERY 24 HOURS
Qty: 0 | Refills: 0 | Status: DISCONTINUED | OUTPATIENT
Start: 2018-11-18 | End: 2018-11-20

## 2018-11-18 RX ORDER — AZITHROMYCIN 500 MG/1
500 TABLET, FILM COATED ORAL DAILY
Qty: 0 | Refills: 0 | Status: DISCONTINUED | OUTPATIENT
Start: 2018-11-18 | End: 2018-11-20

## 2018-11-18 RX ORDER — BUPROPION HYDROCHLORIDE 150 MG/1
1 TABLET, EXTENDED RELEASE ORAL
Qty: 0 | Refills: 0 | COMMUNITY

## 2018-11-18 RX ORDER — DOCUSATE SODIUM 100 MG
100 CAPSULE ORAL
Qty: 0 | Refills: 0 | Status: DISCONTINUED | OUTPATIENT
Start: 2018-11-18 | End: 2018-11-20

## 2018-11-18 RX ORDER — FLUOXETINE HCL 10 MG
30 CAPSULE ORAL
Qty: 0 | Refills: 0 | COMMUNITY

## 2018-11-18 RX ORDER — ACETAMINOPHEN 500 MG
650 TABLET ORAL EVERY 6 HOURS
Qty: 0 | Refills: 0 | Status: DISCONTINUED | OUTPATIENT
Start: 2018-11-18 | End: 2018-11-20

## 2018-11-18 RX ORDER — ONDANSETRON 8 MG/1
4 TABLET, FILM COATED ORAL EVERY 4 HOURS
Qty: 0 | Refills: 0 | Status: DISCONTINUED | OUTPATIENT
Start: 2018-11-18 | End: 2018-11-20

## 2018-11-18 RX ORDER — CEFTRIAXONE 500 MG/1
1 INJECTION, POWDER, FOR SOLUTION INTRAMUSCULAR; INTRAVENOUS EVERY 24 HOURS
Qty: 0 | Refills: 0 | Status: DISCONTINUED | OUTPATIENT
Start: 2018-11-18 | End: 2018-11-18

## 2018-11-18 RX ORDER — SERTRALINE 25 MG/1
1 TABLET, FILM COATED ORAL
Qty: 0 | Refills: 0 | COMMUNITY

## 2018-11-18 RX ORDER — LAMOTRIGINE 25 MG/1
1 TABLET, ORALLY DISINTEGRATING ORAL
Qty: 0 | Refills: 0 | COMMUNITY

## 2018-11-18 RX ADMIN — Medication 650 MILLIGRAM(S): at 23:21

## 2018-11-18 RX ADMIN — Medication 30 MILLIGRAM(S): at 08:29

## 2018-11-18 RX ADMIN — AZITHROMYCIN 500 MILLIGRAM(S): 500 TABLET, FILM COATED ORAL at 13:29

## 2018-11-18 RX ADMIN — CEFTRIAXONE 1000 MILLIGRAM(S): 500 INJECTION, POWDER, FOR SOLUTION INTRAMUSCULAR; INTRAVENOUS at 13:28

## 2018-11-18 RX ADMIN — Medication 650 MILLIGRAM(S): at 09:34

## 2018-11-18 RX ADMIN — Medication 200 MILLIGRAM(S): at 09:29

## 2018-11-18 RX ADMIN — Medication 650 MILLIGRAM(S): at 17:29

## 2018-11-18 RX ADMIN — ENOXAPARIN SODIUM 40 MILLIGRAM(S): 100 INJECTION SUBCUTANEOUS at 21:16

## 2018-11-18 RX ADMIN — Medication 200 MILLIGRAM(S): at 17:29

## 2018-11-18 RX ADMIN — Medication 200 MILLIGRAM(S): at 23:22

## 2018-11-18 RX ADMIN — Medication 650 MILLIGRAM(S): at 23:55

## 2018-11-18 NOTE — ED ADULT NURSE REASSESSMENT NOTE - NS ED NURSE REASSESS COMMENT FT1
Pt rec'd from [facility]. AxOx4. Pt c/o minimal SOB - 97%sat on RA, placed 2LNC oxygen therapy for pt comfort. No other acute distress. Awaiting for bed assignment and admitting orders. Safety and comfort maintained.

## 2018-11-18 NOTE — ED ADULT NURSE REASSESSMENT NOTE - NS ED NURSE REASSESS COMMENT FT1
pt updated on  plan of care, awaiting hospitalist evaluation for admission. Pt has no complaints at this time.

## 2018-11-18 NOTE — CONSULT NOTE ADULT - ASSESSMENT
PROBLEMS:    ACUTE RESPIRATORY DISTRESS  COMMUNITY ACQUIRED PNEUMONIA  VIRAL URI-ENTEROVIRUS  Anxiety     PLAN:    ATYPICAL PNEUMONIA SCREEN  IV RHOCEPHIN/AZITHROMYCIN  SUPPORTIVE CARE  DVT PROPHYLASIX

## 2018-11-18 NOTE — H&P ADULT - HISTORY OF PRESENT ILLNESS
This is a 27 y/o male with PMHx of seasonal bipolar, depression presented to the ED complaining of 5 days of worsening dyspnea, nonproductive cough and chills and diaphoresis at home. Patient reports feeling sick on Wednesday at work (works as a ) rested at home but did not feel better. He proceeded to drink 1 bottle of Nyquil over the span of 1.5 days and felt more weak with ongoing chills and dsypnea thus sough medical attention. Did not measure temperature for fever. No CP, abd pain or nausea. Has intermittent loose stools.     In the ED, CBC and CMP within normal limits, + Enterovirus noted on PCR.  D-dimer elevated thus CTA Chest performed revealing mulitifocal pneumonia in the left lung, no PE. Patient given IVFs, IV Azithromycin and Rocephin with admission requested.   Seen in ED complaining of ongoing cough, mild dyspnea and weakness.     ROS: all other ROS is neg unless stated above.     PMH: Anxiety/ Depression  Meds: Prozac 30mg daily  NKDA  Social Hx: Works as a  in school, nonsmoker, no ETOH, illicit drug use.   Surgical Hx: Right eye partial retinal detachment, right ACL tear repair on knee  Family Hx: no hx of CVA/ MI in mother or father.       Vital Signs Last 24 Hrs  T(C): 37.3 (18 Nov 2018 07:32), Max: 38.2 (17 Nov 2018 20:51)  T(F): 99.1 (18 Nov 2018 07:32), Max: 100.7 (17 Nov 2018 20:51)  HR: 75 (18 Nov 2018 07:32) (71 - 80)  BP: 133/69 (18 Nov 2018 07:32) (125/90 - 133/76)  BP(mean): 86 (18 Nov 2018 06:37) (86 - 86)  RR: 18 (18 Nov 2018 07:32) (16 - 18)  SpO2: 97% (18 Nov 2018 07:32) (92% - 98%)    PHYSICAL EXAM:  Constitutional: Young male appears sick but awake and alert, well-developed  HEENT: PERR, EOMI, Normal Hearing, MMM  Neck: Soft and supple, No LAD, No JVD  Respiratory: Decreased breath sounds on left base, no active wheezing or rhonchi.   Cardiovascular: S1 and S2, regular rate and rhythm, no Murmurs, gallops or rubs  Gastrointestinal: Bowel Sounds present, soft, nontender, nondistended, no guarding, no rebound  Extremities: No peripheral edema  Vascular: 2+ peripheral pulses  Neurological: A/O x 3, no focal deficits  Musculoskeletal: 5/5 strength b/l upper and lower extremities  Skin: No rashes    MEDICATIONS  (STANDING):  azithromycin   Tablet 500 milliGRAM(s) Oral daily  cefTRIAXone   IVPB 1 Gram(s) IV Intermittent every 24 hours  enoxaparin Injectable 40 milliGRAM(s) SubCutaneous every 24 hours  FLUoxetine 30 milliGRAM(s) Oral daily      LABS: All Labs Reviewed:                        13.2   8.19  )-----------( 283      ( 17 Nov 2018 21:32 )             38.3     11-17    135  |  100  |  8   ----------------------------<  105<H>  4.0   |  26  |  0.96    Ca    8.8      17 Nov 2018 21:32  Mg     2.0     11-17  TPro  8.2  /  Alb  3.3  /  TBili  0.3  /  DBili  x   /  AST  10<L>  /  ALT  24  /  AlkPhos  96  11-17      CARDIAC MARKERS ( 17 Nov 2018 21:32 )  <0.015 ng/mL / x     / 47 U/L / x     / x        Blood Culture: pending    CT Angio Chest PE Protocol w/ IV Cont (11.17.18 @ 23:47) >  IMPRESSION: No pulmonary embolism. Multifocal pneumonia in the left lung   with reactive lymphadenopathy.      ASSESSMENT AND PLAN: This is a 27 y/o male with PMHx of seasonal bipolar, depression presented to the ED complaining of 5 days of worsening dyspnea, nonproductive cough and chills and diaphoresis at home. Patient reports feeling sick on Wednesday at work (works as a ) rested at home but did not feel better. He proceeded to drink 1 bottle of Nyquil over the span of 1.5 days and felt more weak with ongoing chills and dsypnea thus sough medical attention. Did not measure temperature for fever. No CP, abd pain or nausea. Has intermittent loose stools.     In the ED, CBC and CMP within normal limits, + Enterovirus noted on PCR.  D-dimer elevated thus CTA Chest performed revealing mulitifocal pneumonia in the left lung, no PE. Patient given IVFs, IV Azithromycin and Rocephin with admission requested.   Seen in ED complaining of ongoing cough, mild dyspnea and weakness.     ROS: all other ROS is neg unless stated above.     PMH: Anxiety/ Depression  Meds: Prozac 30mg daily  NKDA  Social Hx: Works as a  in school, nonsmoker, no ETOH, illicit drug use.   Surgical Hx: Right eye partial retinal detachment, right ACL tear repair on knee  Family Hx: no hx of CVA/ MI in mother or father.       Vital Signs Last 24 Hrs  T(C): 37.3 (18 Nov 2018 07:32), Max: 38.2 (17 Nov 2018 20:51)  T(F): 99.1 (18 Nov 2018 07:32), Max: 100.7 (17 Nov 2018 20:51)  HR: 75 (18 Nov 2018 07:32) (71 - 80)  BP: 133/69 (18 Nov 2018 07:32) (125/90 - 133/76)  BP(mean): 86 (18 Nov 2018 06:37) (86 - 86)  RR: 18 (18 Nov 2018 07:32) (16 - 18)  SpO2: 97% (18 Nov 2018 07:32) (92% - 98%)    PHYSICAL EXAM:  Constitutional: Young male appears sick but awake and alert, well-developed  HEENT: PERR, EOMI, Normal Hearing, MMM  Neck: Soft and supple, No LAD, No JVD  Respiratory: Decreased breath sounds on left base, no active wheezing or rhonchi.   Cardiovascular: S1 and S2, regular rate and rhythm, no Murmurs, gallops or rubs  Gastrointestinal: Bowel Sounds present, soft, nontender, nondistended, no guarding, no rebound  Extremities: No peripheral edema  Vascular: 2+ peripheral pulses  Neurological: A/O x 3, no focal deficits  Musculoskeletal: 5/5 strength b/l upper and lower extremities  Skin: No rashes    MEDICATIONS  (STANDING):  azithromycin   Tablet 500 milliGRAM(s) Oral daily  cefTRIAXone   IVPB 1 Gram(s) IV Intermittent every 24 hours  enoxaparin Injectable 40 milliGRAM(s) SubCutaneous every 24 hours  FLUoxetine 30 milliGRAM(s) Oral daily      LABS: All Labs Reviewed:                        13.2   8.19  )-----------( 283      ( 17 Nov 2018 21:32 )             38.3     11-17    135  |  100  |  8   ----------------------------<  105<H>  4.0   |  26  |  0.96    Ca    8.8      17 Nov 2018 21:32  Mg     2.0     11-17  TPro  8.2  /  Alb  3.3  /  TBili  0.3  /  DBili  x   /  AST  10<L>  /  ALT  24  /  AlkPhos  96  11-17      CARDIAC MARKERS ( 17 Nov 2018 21:32 )  <0.015 ng/mL / x     / 47 U/L / x     / x        Blood Culture: pending    CT Angio Chest PE Protocol w/ IV Cont (11.17.18 @ 23:47) >  IMPRESSION: No pulmonary embolism. Multifocal pneumonia in the left lung   with reactive lymphadenopathy.      ASSESSMENT AND PLAN:     # COMMUNITY ACQUIRED PNEUMONIA  # ACUTE RESPIRATORY DISTRESS - not hypoxic, hx of anxiety and on NC 2L for comfort.   # VIRAL URI - + ENTEROVIRUS  - will admit and continue IV abx for suspected atypical and gram neg chrissie bacterial pneumonia superimposed on viral infection.   - continue IV Azithromycin and Rocephin.  - f/u blood ctx.   - check urine legionella  - recommend good hand hygiene, prn anti-tussives.     # Anxiety - continue home dose Prozac.   - UDS negative on admission    Diet: regular    DVT ppx: Lovenox  Dispo: admit, discussed with staff.   Total time > 70 mins

## 2018-11-18 NOTE — CONSULT NOTE ADULT - SUBJECTIVE AND OBJECTIVE BOX
HPI:    28 y.o.m with PMHx of seasonal bipolar, depression admitted with complaining of 5 days of worsening dyspnea, nonproductive cough and chills and diaphoresis at home. Pt denies fever. No CP, abd pain or nausea. Has intermittent loose stools. pat had + Enterovirus, D-dimer elevated thus CTA Chest performed revealing mulitifocal pneumonia in the left lung, no PE. Patient admitted & ask to see for pulmonary eval.    Social Hx: Works as a  in school, nonsmoker, no ETOH, illicit drug use.   Surgical Hx: Right eye partial retinal detachment, right ACL tear repair on knee  Family Hx: no hx of CVA/ MI in mother or father.     PAST MEDICAL & SURGICAL HISTORY:  Depression  Anxiety  History of wisdom tooth extraction      Home Medications:  PROzac: 30 milligram(s) orally once a day (2018 07:56)      MEDICATIONS  (STANDING):  azithromycin   Tablet 500 milliGRAM(s) Oral daily  cefTRIAXone Injectable. 1000 milliGRAM(s) IV Push every 24 hours  enoxaparin Injectable 40 milliGRAM(s) SubCutaneous every 24 hours  FLUoxetine 30 milliGRAM(s) Oral daily    MEDICATIONS  (PRN):  acetaminophen   Tablet .. 650 milliGRAM(s) Oral every 6 hours PRN Mild Pain (1 - 3)  docusate sodium 100 milliGRAM(s) Oral two times a day PRN Constipation  guaiFENesin    Syrup 200 milliGRAM(s) Oral every 6 hours PRN Cough  ondansetron Injectable 4 milliGRAM(s) IV Push every 4 hours PRN Nausea and/or Vomiting      Allergies    No Known Allergies    Intolerances        SOCIAL HISTORY: Denies tobacco, etoh abuse or illicit drug use    FAMILY HISTORY:      Vital Signs Last 24 Hrs  T(C): 37.3 (2018 09:20), Max: 38.2 (2018 20:51)  T(F): 99.1 (2018 09:20), Max: 100.7 (2018 20:51)  HR: 88 (2018 09:20) (71 - 88)  BP: 128/85 (2018 09:20) (125/90 - 133/76)  BP(mean): 86 (2018 06:37) (86 - 86)  RR: 18 (2018 09:20) (16 - 18)  SpO2: 98% (2018 09:20) (92% - 98%)        REVIEW OF SYSTEMS:    CONSTITUTIONAL:  As per HPI.  HEENT:  Eyes:  No diplopia or blurred vision. ENT:  No earache, sore throat or runny nose.  CARDIOVASCULAR:  No pressure, squeezing, tightness, heaviness or aching about the chest, neck, axilla or epigastrium.  RESPIRATORY:  + cough, + shortness of breath, PND or orthopnea.  GASTROINTESTINAL:  No nausea, vomiting or diarrhea.  GENITOURINARY:  No dysuria, frequency or urgency.  MUSCULOSKELETAL:  As per HPI.  SKIN:  No change in skin, hair or nails.  NEUROLOGIC:  No paresthesias, fasciculations, seizures or weakness.  PSYCHIATRIC:  No disorder of thought or mood.  ENDOCRINE:  No heat or cold intolerance, polyuria or polydipsia.  HEMATOLOGICAL:  No easy bruising or bleedings:  .     PHYSICAL EXAMINATION:    GENERAL APPEARANCE:  Pt. is not currently dyspneic, in no distress. Pt. is alert, oriented, and pleasant.  HEENT:  Pupils are normal and react normally. No icterus. Mucous membranes well colored.  NECK:  Supple. No lymphadenopathy. Jugular venous pressure not elevated. Carotids equal.   HEART:   The cardiac impulse has a normal quality. Regular. Normal S1 and S2. There are no murmurs, rubs or gallops noted  CHEST:  Chest crackles left lung to auscultation. Normal respiratory effort.  ABDOMEN:  Soft and nontender.   EXTREMITIES:  There is no cyanosis, clubbing or edema.   SKIN:  No rash or significant lesions are noted.    LABS:                        13.2   8.19  )-----------( 283      ( 2018 21:32 )             38.3         135  |  100  |  8   ----------------------------<  105<H>  4.0   |  26  |  0.96    Ca    8.8      2018 21:32  Mg     2.0         TPro  8.2  /  Alb  3.3  /  TBili  0.3  /  DBili  x   /  AST  10<L>  /  ALT  24  /  AlkPhos  96      LIVER FUNCTIONS - ( 2018 21:32 )  Alb: 3.3 g/dL / Pro: 8.2 gm/dL / ALK PHOS: 96 U/L / ALT: 24 U/L / AST: 10 U/L / GGT: x             CARDIAC MARKERS ( 2018 21:32 )  <0.015 ng/mL / x     / 47 U/L / x     / x        Rapid RVP Result: Detected: The [employer] RVP Rapid uses polymerase chain reaction (PCR) and melt  curve analysis to screen for adenovirus; coronavirus HKU1, NL63, 229E,  OC43; human metapneumovirus (hMPV); human enterovirus/rhinovirus  (Entero/RV); influenza A; influenza A/H1;influenza A/H3; influenza  A/H1-2009; influenza B; parainfluenza viruses 1, 2, 3, 4; respiratory  syncytial virus; Bordetella pertussis; Mycoplasma pneumoniae; and  Chlamydophila pneumoniae. (18 @ 21:32)    Urinalysis Basic - ( 2018 23:57 )    Color: Yellow / Appearance: Clear / S.010 / pH: x  Gluc: x / Ketone: Trace  / Bili: Negative / Urobili: Negative mg/dL   Blood: x / Protein: 30 mg/dL / Nitrite: Negative   Leuk Esterase: Trace / RBC: 0-2 /HPF / WBC 26-50   Sq Epi: x / Non Sq Epi: Occasional / Bacteria: Few    RADIOLOGY & ADDITIONAL STUDIES:     CT Angio Chest PE Protocol w/ IV Cont (18 @ 23:47) >  IMPRESSION: No pulmonary embolism. Multifocal pneumonia in the left lung   with reactive lymphadenopathy.

## 2018-11-19 LAB
CULTURE RESULTS: NO GROWTH — SIGNIFICANT CHANGE UP
SPECIMEN SOURCE: SIGNIFICANT CHANGE UP

## 2018-11-19 RX ADMIN — Medication 30 MILLIGRAM(S): at 11:59

## 2018-11-19 RX ADMIN — CEFTRIAXONE 1000 MILLIGRAM(S): 500 INJECTION, POWDER, FOR SOLUTION INTRAMUSCULAR; INTRAVENOUS at 12:05

## 2018-11-19 RX ADMIN — Medication 650 MILLIGRAM(S): at 21:30

## 2018-11-19 RX ADMIN — Medication 200 MILLIGRAM(S): at 05:19

## 2018-11-19 RX ADMIN — Medication 650 MILLIGRAM(S): at 20:42

## 2018-11-19 RX ADMIN — ENOXAPARIN SODIUM 40 MILLIGRAM(S): 100 INJECTION SUBCUTANEOUS at 20:37

## 2018-11-19 RX ADMIN — AZITHROMYCIN 500 MILLIGRAM(S): 500 TABLET, FILM COATED ORAL at 11:59

## 2018-11-20 ENCOUNTER — TRANSCRIPTION ENCOUNTER (OUTPATIENT)
Age: 28
End: 2018-11-20

## 2018-11-20 VITALS
HEART RATE: 52 BPM | TEMPERATURE: 98 F | OXYGEN SATURATION: 97 % | SYSTOLIC BLOOD PRESSURE: 126 MMHG | RESPIRATION RATE: 16 BRPM | DIASTOLIC BLOOD PRESSURE: 71 MMHG

## 2018-11-20 LAB
M PNEUMO IGM SER-ACNC: 132 UNITS/ML — SIGNIFICANT CHANGE UP
MYCOPLASMA AG SPEC QL: NEGATIVE — SIGNIFICANT CHANGE UP

## 2018-11-20 RX ORDER — CEFUROXIME AXETIL 250 MG
1 TABLET ORAL
Qty: 6 | Refills: 0
Start: 2018-11-20 | End: 2018-11-22

## 2018-11-20 RX ADMIN — Medication 200 MILLIGRAM(S): at 02:17

## 2018-11-20 RX ADMIN — Medication 30 MILLIGRAM(S): at 12:48

## 2018-11-20 RX ADMIN — CEFTRIAXONE 1000 MILLIGRAM(S): 500 INJECTION, POWDER, FOR SOLUTION INTRAMUSCULAR; INTRAVENOUS at 12:48

## 2018-11-20 RX ADMIN — AZITHROMYCIN 500 MILLIGRAM(S): 500 TABLET, FILM COATED ORAL at 12:47

## 2018-11-20 NOTE — DISCHARGE NOTE ADULT - MEDICATION SUMMARY - MEDICATIONS TO TAKE
I will START or STAY ON the medications listed below when I get home from the hospital:    PROzac  -- 30 milligram(s) by mouth once a day  -- Indication: For Depression    cefuroxime 500 mg oral tablet  -- 1 tab(s) by mouth 2 times a day for pneumonia. Start on 11/21/18  -- Finish all this medication unless otherwise directed by prescriber.  Medication should be taken with plenty of water.  Take with food or milk.    -- Indication: For PNEUMONIA

## 2018-11-20 NOTE — PROGRESS NOTE ADULT - ASSESSMENT
ASSESSMENT AND PLAN:     # COMMUNITY ACQUIRED PNEUMONIA  # ACUTE RESPIRATORY DISTRESS - not hypoxic, hx of anxiety and on NC 2L for comfort.   # VIRAL URI - + ENTEROVIRUS  -  continue IV abx for suspected atypical and gram neg chrissie bacterial pneumonia superimposed on viral infection.   - continue IV Azithromycin and Rocephin day #2.   - f/u blood ctx.   - check urine legionella  - recommend good hand hygiene, prn anti-tussives.   - appreciate pulm input --> discussed w/ Dr. Huang    # Anxiety - continue home dose Prozac.   - UDS negative on admission    Diet: regular    DVT ppx: Lovenox  Dispo: admit, discussed with staff. Likely dc home tomorrow.   Total time > 35 mins
PROBLEMS:    ACUTE RESPIRATORY DISTRESS  COMMUNITY ACQUIRED PNEUMONIA  VIRAL URI-ENTEROVIRUS  Anxiety     PLAN:    PAT CAN DECD HOME TO FU IN MY OFFICE ON FRIDAY  ATYPICAL PNEUMONIA SCREEN- PENDING  IV RHOCEPHIN/PO AZITHROMYCIN  SUPPORTIVE CARE  DVT PROPHYLASIX
PROBLEMS:    ACUTE RESPIRATORY DISTRESS  COMMUNITY ACQUIRED PNEUMONIA  VIRAL URI-ENTEROVIRUS  Anxiety     PLAN:    WATCH FOR 24HRS BEFORE DECD HOME  ATYPICAL PNEUMONIA SCREEN- PENDING  IV RHOCEPHIN/PO AZITHROMYCIN  SUPPORTIVE CARE  DVT PROPHYLASIX

## 2018-11-20 NOTE — PROGRESS NOTE ADULT - REASON FOR ADMISSION
Cough, chills, shortness of breath

## 2018-11-20 NOTE — DISCHARGE NOTE ADULT - PATIENT PORTAL LINK FT
You can access the [facility] Patient Portal, offered by [facility], by registering with the following website: [email or web address]

## 2018-11-20 NOTE — DISCHARGE NOTE ADULT - HOSPITAL COURSE
CC: cough    HPI: This is a 29 y/o male with PMHx of seasonal bipolar, depression presented to the ED complaining of 5 days of worsening dyspnea, nonproductive cough and chills and diaphoresis at home. Patient reports feeling sick on Wednesday at work (works as a ) rested at home but did not feel better. He proceeded to drink 1 bottle of Nyquil over the span of 1.5 days and felt more weak with ongoing chills and dsypnea thus sough medical attention. Did not measure temperature for fever. No CP, abd pain or nausea. Has intermittent loose stools.     In the ED, CBC and CMP within normal limits, + Enterovirus noted on PCR.  D-dimer elevated thus CTA Chest performed revealing mulitifocal pneumonia in the left lung, no PE. Patient given IVFs, IV Azithromycin and Rocephin with admission requested.     subj: No fevers overnight, cough much improved. Ready for dc.     ROS :stated above otherwise neg      Vital Signs Last 24 Hrs  T(C): 36.6 (20 Nov 2018 04:54), Max: 36.8 (19 Nov 2018 12:30)  T(F): 97.9 (20 Nov 2018 04:54), Max: 98.2 (19 Nov 2018 12:30)  HR: 58 (20 Nov 2018 04:54) (56 - 58)  BP: 120/67 (20 Nov 2018 04:54) (120/67 - 121/63)  BP(mean): --  RR: 16 (20 Nov 2018 04:54) (16 - 16)  SpO2: 97% (20 Nov 2018 04:54) (94% - 97%)    PHYSICAL EXAM:    Constitutional: NAD, awake and alert, well-developed  HEENT: PERR, EOMI, Normal Hearing, MMM  Neck: Soft and supple, No LAD, No JVD  Respiratory: Breath sounds mildly decreased at bases, no wheezing.   Cardiovascular: S1 and S2, regular rate and rhythm, no Murmurs, gallops or rubs  Gastrointestinal: Bowel Sounds present, soft, nontender, nondistended, no guarding, no rebound  Extremities: No peripheral edema  Vascular: 2+ peripheral pulses  Neurological: A/O x 3, no focal deficits  Musculoskeletal: 5/5 strength b/l upper and lower extremities  Skin: No rashes    All meds/labs reviewed.     ASSESSMENT AND PLAN:     # COMMUNITY ACQUIRED PNEUMONIA  # ACUTE RESPIRATORY DISTRESS - not hypoxic, hx of anxiety and on NC 2L for comfort.   # VIRAL URI - + ENTEROVIRUS  -  continue IV abx for suspected atypical and gram neg chrissie bacterial pneumonia superimposed on viral infection.   - continue IV Azithromycin and Rocephin day #3 --> dc on Ceftin BID to complete 7 day course.   - neg blood ctx.   - checking urine legionella  - recommend good hand hygiene, prn anti-tussives.   - appreciate pulm input --> discussed w/ Dr. Huang    # Anxiety - continue home dose Prozac.   - UDS negative on admission    DC home w/ PO abx. Discussed w/ pulm.   Total time > 40 mins

## 2018-11-20 NOTE — PROGRESS NOTE ADULT - SUBJECTIVE AND OBJECTIVE BOX
CC: cough    Subj: No fevers but noted diaphoresis overnight, no CP .+ Cough    ROS :stated above otherwise neg      Vital Signs Last 24 Hrs  T(C): 36.4 (19 Nov 2018 05:05), Max: 37.7 (18 Nov 2018 17:33)  T(F): 97.5 (19 Nov 2018 05:05), Max: 99.8 (18 Nov 2018 17:33)  HR: 57 (19 Nov 2018 05:05) (57 - 73)  BP: 127/68 (19 Nov 2018 05:05) (127/68 - 142/74)  BP(mean): --  RR: 18 (19 Nov 2018 05:05) (18 - 18)  SpO2: 96% (19 Nov 2018 05:05) (96% - 98%)  PHYSICAL EXAM:    Constitutional: NAD, awake and alert, well-developed  HEENT: PERR, EOMI, Normal Hearing, MMM  Neck: Soft and supple, No LAD, No JVD  Respiratory: Breath sounds mildly decreased at bases, no wheezing.   Cardiovascular: S1 and S2, regular rate and rhythm, no Murmurs, gallops or rubs  Gastrointestinal: Bowel Sounds present, soft, nontender, nondistended, no guarding, no rebound  Extremities: No peripheral edema  Vascular: 2+ peripheral pulses  Neurological: A/O x 3, no focal deficits  Musculoskeletal: 5/5 strength b/l upper and lower extremities  Skin: No rashes    MEDICATIONS  (STANDING):  azithromycin   Tablet 500 milliGRAM(s) Oral daily  cefTRIAXone Injectable. 1000 milliGRAM(s) IV Push every 24 hours  enoxaparin Injectable 40 milliGRAM(s) SubCutaneous every 24 hours  FLUoxetine 30 milliGRAM(s) Oral daily      LABS: All Labs Reviewed:                        13.2   8.19  )-----------( 283      ( 17 Nov 2018 21:32 )             38.3     11-17    135  |  100  |  8   ----------------------------<  105<H>  4.0   |  26  |  0.96    Ca    8.8      17 Nov 2018 21:32  Mg     2.0     11-17    TPro  8.2  /  Alb  3.3  /  TBili  0.3  /  DBili  x   /  AST  10<L>  /  ALT  24  /  AlkPhos  96  11-17      CARDIAC MARKERS ( 17 Nov 2018 21:32 )  <0.015 ng/mL / x     / 47 U/L / x     / x          Blood Culture: 11-17 @ 21:34  Organism --  Gram Stain Blood -- Gram Stain --  Specimen Source .Blood None  Culture-Blood --    11-17 @ 21:32  Organism --  Gram Stain Blood -- Gram Stain --  Specimen Source .Blood None  Culture-Blood --
Subjective:    Home Medications:  PROzac: 30 milligram(s) orally once a day (2018 07:56)    MEDICATIONS  (STANDING):  azithromycin   Tablet 500 milliGRAM(s) Oral daily  cefTRIAXone Injectable. 1000 milliGRAM(s) IV Push every 24 hours  enoxaparin Injectable 40 milliGRAM(s) SubCutaneous every 24 hours  FLUoxetine 30 milliGRAM(s) Oral daily    MEDICATIONS  (PRN):  acetaminophen   Tablet .. 650 milliGRAM(s) Oral every 6 hours PRN Mild Pain (1 - 3)  docusate sodium 100 milliGRAM(s) Oral two times a day PRN Constipation  guaiFENesin    Syrup 200 milliGRAM(s) Oral every 6 hours PRN Cough  ondansetron Injectable 4 milliGRAM(s) IV Push every 4 hours PRN Nausea and/or Vomiting      Allergies    No Known Allergies    Intolerances        Vital Signs Last 24 Hrs  T(C): 36.4 (2018 05:05), Max: 37.7 (2018 17:33)  T(F): 97.5 (2018 05:05), Max: 99.8 (2018 17:33)  HR: 57 (2018 05:05) (57 - 73)  BP: 127/68 (2018 05:05) (127/68 - 142/74)  BP(mean): --  RR: 18 (2018 05:05) (18 - 18)  SpO2: 96% (2018 05:05) (96% - 98%)      PHYSICAL EXAMINATION:    NECK:  Supple. No lymphadenopathy. Jugular venous pressure not elevated. Carotids equal.   HEART:   The cardiac impulse has a normal quality. Reg., Nl S1 and S2.  There are no murmurs, rubs or gallops noted  CHEST:  Chest crackles to auscultation. Normal respiratory effort.  ABDOMEN:  Soft and nontender.   EXTREMITIES:  There is no edema.       LABS:                        13.2   8.19  )-----------( 283      ( 2018 21:32 )             38.3         135  |  100  |  8   ----------------------------<  105<H>  4.0   |  26  |  0.96    Ca    8.8      2018 21:32  Mg     2.0         TPro  8.2  /  Alb  3.3  /  TBili  0.3  /  DBili  x   /  AST  10<L>  /  ALT  24  /  AlkPhos  96  -      Urinalysis Basic - ( 2018 23:57 )    Color: Yellow / Appearance: Clear / S.010 / pH: x  Gluc: x / Ketone: Trace  / Bili: Negative / Urobili: Negative mg/dL   Blood: x / Protein: 30 mg/dL / Nitrite: Negative   Leuk Esterase: Trace / RBC: 0-2 /HPF / WBC 26-50   Sq Epi: x / Non Sq Epi: Occasional / Bacteria: Few
Subjective:    pat better, no new complaint, ambulating.    Home Medications:  PROzac: 30 milligram(s) orally once a day (18 Nov 2018 07:56)    MEDICATIONS  (STANDING):  azithromycin   Tablet 500 milliGRAM(s) Oral daily  cefTRIAXone Injectable. 1000 milliGRAM(s) IV Push every 24 hours  enoxaparin Injectable 40 milliGRAM(s) SubCutaneous every 24 hours  FLUoxetine 30 milliGRAM(s) Oral daily    MEDICATIONS  (PRN):  acetaminophen   Tablet .. 650 milliGRAM(s) Oral every 6 hours PRN Mild Pain (1 - 3)  docusate sodium 100 milliGRAM(s) Oral two times a day PRN Constipation  guaiFENesin    Syrup 200 milliGRAM(s) Oral every 6 hours PRN Cough  ondansetron Injectable 4 milliGRAM(s) IV Push every 4 hours PRN Nausea and/or Vomiting      Allergies    No Known Allergies    Intolerances        Vital Signs Last 24 Hrs  T(C): 36.6 (20 Nov 2018 04:54), Max: 36.8 (19 Nov 2018 12:30)  T(F): 97.9 (20 Nov 2018 04:54), Max: 98.2 (19 Nov 2018 12:30)  HR: 58 (20 Nov 2018 04:54) (56 - 58)  BP: 120/67 (20 Nov 2018 04:54) (120/67 - 121/63)  BP(mean): --  RR: 16 (20 Nov 2018 04:54) (16 - 16)  SpO2: 97% (20 Nov 2018 04:54) (94% - 97%)      PHYSICAL EXAMINATION:    NECK:  Supple. No lymphadenopathy. Jugular venous pressure not elevated. Carotids equal.   HEART:   The cardiac impulse has a normal quality. Reg., Nl S1 and S2.  There are no murmurs, rubs or gallops noted  CHEST:  Chest crackles to auscultation. Normal respiratory effort.  ABDOMEN:  Soft and nontender.   EXTREMITIES:  There is no edema.       LABS:

## 2018-11-20 NOTE — DISCHARGE NOTE ADULT - CARE PLAN
Principal Discharge DX:	Community acquired pneumonia  Goal:	treatment of infection  Assessment and plan of treatment:	Continue antibiotics as directed and follow up with PCP and Lung doctor.

## 2018-11-20 NOTE — DISCHARGE NOTE ADULT - CARE PROVIDER_API CALL
Daryl Huang), Critical Care Medicine; Internal Medicine; Pulmonary Disease; Sleep Medicine  [address] [place] [address]  Phone: [phone]  Fax: [phone]    Syd Caballero), Internal Medicine  [address] [place] [address]  Phone: [phone]  Fax: [phone]

## 2018-11-23 LAB
CULTURE RESULTS: SIGNIFICANT CHANGE UP
CULTURE RESULTS: SIGNIFICANT CHANGE UP
SPECIMEN SOURCE: SIGNIFICANT CHANGE UP
SPECIMEN SOURCE: SIGNIFICANT CHANGE UP

## 2018-11-27 DIAGNOSIS — J18.9 PNEUMONIA, UNSPECIFIED ORGANISM: ICD-10-CM

## 2018-11-27 DIAGNOSIS — B97.10 UNSPECIFIED ENTEROVIRUS AS THE CAUSE OF DISEASES CLASSIFIED ELSEWHERE: ICD-10-CM

## 2018-11-27 DIAGNOSIS — R06.03 ACUTE RESPIRATORY DISTRESS: ICD-10-CM

## 2018-11-27 DIAGNOSIS — J06.9 ACUTE UPPER RESPIRATORY INFECTION, UNSPECIFIED: ICD-10-CM

## 2018-11-27 DIAGNOSIS — F41.9 ANXIETY DISORDER, UNSPECIFIED: ICD-10-CM

## 2018-12-13 ENCOUNTER — EMERGENCY (EMERGENCY)
Facility: HOSPITAL | Age: 28
LOS: 0 days | Discharge: HOME | End: 2018-12-13
Attending: EMERGENCY MEDICINE | Admitting: EMERGENCY MEDICINE
Payer: COMMERCIAL

## 2018-12-13 VITALS
TEMPERATURE: 98 F | RESPIRATION RATE: 16 BRPM | DIASTOLIC BLOOD PRESSURE: 95 MMHG | OXYGEN SATURATION: 98 % | HEART RATE: 62 BPM | SYSTOLIC BLOOD PRESSURE: 150 MMHG

## 2018-12-13 VITALS — WEIGHT: 205.91 LBS | HEIGHT: 70 IN

## 2018-12-13 DIAGNOSIS — F41.9 ANXIETY DISORDER, UNSPECIFIED: ICD-10-CM

## 2018-12-13 DIAGNOSIS — F32.9 MAJOR DEPRESSIVE DISORDER, SINGLE EPISODE, UNSPECIFIED: ICD-10-CM

## 2018-12-13 DIAGNOSIS — Z76.0 ENCOUNTER FOR ISSUE OF REPEAT PRESCRIPTION: ICD-10-CM

## 2018-12-13 PROCEDURE — 99283 EMERGENCY DEPT VISIT LOW MDM: CPT

## 2018-12-13 RX ORDER — FLUOXETINE HCL 10 MG
1 CAPSULE ORAL
Qty: 14 | Refills: 0 | OUTPATIENT
Start: 2018-12-13 | End: 2018-12-26

## 2018-12-13 NOTE — ED STATDOCS - NS_ ATTENDINGSCRIBEDETAILS _ED_A_ED_FT
I, Washington Duke MD,  performed the initial face to face bedside interview with this patient regarding history of present illness, review of symptoms and relevant past medical, social and family history.  I completed an independent physical examination.    The history, relevant review of systems, past medical and surgical history, medical decision making, and physical examination was documented by the scribe in my presence and I attest to the accuracy of the documentation.

## 2018-12-13 NOTE — ED STATDOCS - MEDICAL DECISION MAKING DETAILS
29 y/o M with hx of anxiety and depression here for psych med refill. Plan: med refill, two weeks emergency supply, follow up with psych as outpatient.

## 2018-12-13 NOTE — ED STATDOCS - OBJECTIVE STATEMENT
27 y/o M with a PMHx of Anxiety and Depression presenting to the ED for med refill. Pt states that he missed a few appointments with his psychiatrist so he had his service discontinued. He was able to get an appointment with a psychiatrist next month and was told to come into ED for refill of his psych meds until this point, ran out of meds two days ago. No complaints in ED. Denies any SI or HI. NKDA.

## 2018-12-13 NOTE — ED ADULT TRIAGE NOTE - CHIEF COMPLAINT QUOTE
pt presents to ED states he has been seeing a outpatient psychiatrist but couldn't get an appointment for a month, need medical refill. Brought medication bottles with him.

## 2019-01-28 ENCOUNTER — TRANSCRIPTION ENCOUNTER (OUTPATIENT)
Age: 29
End: 2019-01-28

## 2019-06-16 ENCOUNTER — EMERGENCY (EMERGENCY)
Facility: HOSPITAL | Age: 29
LOS: 0 days | Discharge: HOME | End: 2019-06-16
Attending: EMERGENCY MEDICINE | Admitting: EMERGENCY MEDICINE
Payer: COMMERCIAL

## 2019-06-16 VITALS
DIASTOLIC BLOOD PRESSURE: 89 MMHG | WEIGHT: 199.96 LBS | SYSTOLIC BLOOD PRESSURE: 143 MMHG | TEMPERATURE: 99 F | OXYGEN SATURATION: 100 % | RESPIRATION RATE: 17 BRPM | HEART RATE: 86 BPM

## 2019-06-16 DIAGNOSIS — F10.120 ALCOHOL ABUSE WITH INTOXICATION, UNCOMPLICATED: ICD-10-CM

## 2019-06-16 DIAGNOSIS — F41.9 ANXIETY DISORDER, UNSPECIFIED: ICD-10-CM

## 2019-06-16 DIAGNOSIS — Z79.899 OTHER LONG TERM (CURRENT) DRUG THERAPY: ICD-10-CM

## 2019-06-16 DIAGNOSIS — F32.9 MAJOR DEPRESSIVE DISORDER, SINGLE EPISODE, UNSPECIFIED: ICD-10-CM

## 2019-06-16 PROCEDURE — 99284 EMERGENCY DEPT VISIT MOD MDM: CPT

## 2019-06-16 NOTE — ED PROVIDER NOTE - OBJECTIVE STATEMENT
29 y/o male in ED stating that he was walking home after drinking alcohol tonight and was stopped by EMS and told that he needs to go to ED for eval.   pt denies any fever, HA< cp, sob, n/v/d/abd pain.    denies any trauma.   pt states "I feel fie and want to go home".

## 2019-06-16 NOTE — ED ADULT NURSE NOTE - OBJECTIVE STATEMENT
Pt BIBEMS for medical evaluation. Pt was found walking down the street, not causing a disruption. States "I think someone called 911 because I didn't". Reports drinking 3 beers, denies chronic etoh abuse. Ambulates steadily. Denies chest pain, SOB, weakness, dizziness. No PMH. Pt has money for a cab and keys to his house. "I want to go home" per pt.

## 2019-06-16 NOTE — ED PROVIDER NOTE - NSFOLLOWUPINSTRUCTIONS_ED_ALL_ED_FT
please follow up with your doctor in 2-3 days.    drink plenty of fluids.   return to ED for any concerns

## 2019-06-16 NOTE — ED PROVIDER NOTE - CLINICAL SUMMARY MEDICAL DECISION MAKING FREE TEXT BOX
pt with alcohol use.   A&Ox3.   ambulating without difficulty.   states wants to go home and will walk home.   will d/c

## 2019-06-16 NOTE — ED ADULT TRIAGE NOTE - CHIEF COMPLAINT QUOTE
Found walking on side of road by EMS. EMS states patient was brought to ED "to sober up" Patient alert and oriented, acting appropriately, admits to drinking today. VS stable, no distress noted, no complaints at this time.
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
## 2020-03-02 ENCOUNTER — EMERGENCY (EMERGENCY)
Facility: HOSPITAL | Age: 30
LOS: 0 days | Discharge: HOME | End: 2020-03-02
Attending: EMERGENCY MEDICINE
Payer: MEDICAID

## 2020-03-02 VITALS
TEMPERATURE: 98 F | DIASTOLIC BLOOD PRESSURE: 76 MMHG | SYSTOLIC BLOOD PRESSURE: 129 MMHG | RESPIRATION RATE: 17 BRPM | HEART RATE: 71 BPM | OXYGEN SATURATION: 99 %

## 2020-03-02 VITALS — WEIGHT: 190.04 LBS

## 2020-03-02 DIAGNOSIS — R20.2 PARESTHESIA OF SKIN: ICD-10-CM

## 2020-03-02 DIAGNOSIS — F41.9 ANXIETY DISORDER, UNSPECIFIED: ICD-10-CM

## 2020-03-02 DIAGNOSIS — R20.0 ANESTHESIA OF SKIN: ICD-10-CM

## 2020-03-02 DIAGNOSIS — F32.89 OTHER SPECIFIED DEPRESSIVE EPISODES: ICD-10-CM

## 2020-03-02 LAB
ALBUMIN SERPL ELPH-MCNC: 4.2 G/DL — SIGNIFICANT CHANGE UP (ref 3.3–5)
ALP SERPL-CCNC: 95 U/L — SIGNIFICANT CHANGE UP (ref 40–120)
ALT FLD-CCNC: 29 U/L — SIGNIFICANT CHANGE UP (ref 12–78)
ANION GAP SERPL CALC-SCNC: 6 MMOL/L — SIGNIFICANT CHANGE UP (ref 5–17)
AST SERPL-CCNC: 17 U/L — SIGNIFICANT CHANGE UP (ref 15–37)
BASOPHILS # BLD AUTO: 0.02 K/UL — SIGNIFICANT CHANGE UP (ref 0–0.2)
BASOPHILS NFR BLD AUTO: 0.3 % — SIGNIFICANT CHANGE UP (ref 0–2)
BILIRUB SERPL-MCNC: 0.2 MG/DL — SIGNIFICANT CHANGE UP (ref 0.2–1.2)
BUN SERPL-MCNC: 16 MG/DL — SIGNIFICANT CHANGE UP (ref 7–23)
CALCIUM SERPL-MCNC: 9 MG/DL — SIGNIFICANT CHANGE UP (ref 8.5–10.1)
CHLORIDE SERPL-SCNC: 104 MMOL/L — SIGNIFICANT CHANGE UP (ref 96–108)
CO2 SERPL-SCNC: 27 MMOL/L — SIGNIFICANT CHANGE UP (ref 22–31)
CREAT SERPL-MCNC: 0.99 MG/DL — SIGNIFICANT CHANGE UP (ref 0.5–1.3)
EOSINOPHIL # BLD AUTO: 0.08 K/UL — SIGNIFICANT CHANGE UP (ref 0–0.5)
EOSINOPHIL NFR BLD AUTO: 1.2 % — SIGNIFICANT CHANGE UP (ref 0–6)
GLUCOSE SERPL-MCNC: 95 MG/DL — SIGNIFICANT CHANGE UP (ref 70–99)
HCT VFR BLD CALC: 42.3 % — SIGNIFICANT CHANGE UP (ref 39–50)
HGB BLD-MCNC: 14.9 G/DL — SIGNIFICANT CHANGE UP (ref 13–17)
IMM GRANULOCYTES NFR BLD AUTO: 0.1 % — SIGNIFICANT CHANGE UP (ref 0–1.5)
LYMPHOCYTES # BLD AUTO: 2.35 K/UL — SIGNIFICANT CHANGE UP (ref 1–3.3)
LYMPHOCYTES # BLD AUTO: 34.7 % — SIGNIFICANT CHANGE UP (ref 13–44)
MCHC RBC-ENTMCNC: 30.7 PG — SIGNIFICANT CHANGE UP (ref 27–34)
MCHC RBC-ENTMCNC: 35.2 GM/DL — SIGNIFICANT CHANGE UP (ref 32–36)
MCV RBC AUTO: 87 FL — SIGNIFICANT CHANGE UP (ref 80–100)
MONOCYTES # BLD AUTO: 0.56 K/UL — SIGNIFICANT CHANGE UP (ref 0–0.9)
MONOCYTES NFR BLD AUTO: 8.3 % — SIGNIFICANT CHANGE UP (ref 2–14)
NEUTROPHILS # BLD AUTO: 3.75 K/UL — SIGNIFICANT CHANGE UP (ref 1.8–7.4)
NEUTROPHILS NFR BLD AUTO: 55.4 % — SIGNIFICANT CHANGE UP (ref 43–77)
PLATELET # BLD AUTO: 271 K/UL — SIGNIFICANT CHANGE UP (ref 150–400)
POTASSIUM SERPL-MCNC: 4.2 MMOL/L — SIGNIFICANT CHANGE UP (ref 3.5–5.3)
POTASSIUM SERPL-SCNC: 4.2 MMOL/L — SIGNIFICANT CHANGE UP (ref 3.5–5.3)
PROT SERPL-MCNC: 8.1 GM/DL — SIGNIFICANT CHANGE UP (ref 6–8.3)
RBC # BLD: 4.86 M/UL — SIGNIFICANT CHANGE UP (ref 4.2–5.8)
RBC # FLD: 11.9 % — SIGNIFICANT CHANGE UP (ref 10.3–14.5)
SODIUM SERPL-SCNC: 137 MMOL/L — SIGNIFICANT CHANGE UP (ref 135–145)
TSH SERPL-MCNC: 1.91 UU/ML — SIGNIFICANT CHANGE UP (ref 0.34–4.82)
WBC # BLD: 6.77 K/UL — SIGNIFICANT CHANGE UP (ref 3.8–10.5)
WBC # FLD AUTO: 6.77 K/UL — SIGNIFICANT CHANGE UP (ref 3.8–10.5)

## 2020-03-02 PROCEDURE — 93010 ELECTROCARDIOGRAM REPORT: CPT

## 2020-03-02 PROCEDURE — 80053 COMPREHEN METABOLIC PANEL: CPT

## 2020-03-02 PROCEDURE — 99283 EMERGENCY DEPT VISIT LOW MDM: CPT

## 2020-03-02 PROCEDURE — 82607 VITAMIN B-12: CPT

## 2020-03-02 PROCEDURE — 85025 COMPLETE CBC W/AUTO DIFF WBC: CPT

## 2020-03-02 PROCEDURE — 84443 ASSAY THYROID STIM HORMONE: CPT

## 2020-03-02 PROCEDURE — 93005 ELECTROCARDIOGRAM TRACING: CPT

## 2020-03-02 PROCEDURE — 36415 COLL VENOUS BLD VENIPUNCTURE: CPT

## 2020-03-02 PROCEDURE — 86618 LYME DISEASE ANTIBODY: CPT

## 2020-03-02 NOTE — ED STATDOCS - PROGRESS NOTE DETAILS
signed Asmita Braxton PA-C Pt seen in intake initially by Dr Hansen.   29M here c/o tingling in fingers and toes for 2-3 days. Pt had lamictal increased from 50 to 100 QD several days ago by his psychiatrist for worsening anxiety symptoms. Pt also on trazodone 100mg and fluoxetine 20mg. Pt denies SI. Offered for him to speak with psychiatry here but he declines and will speak with his doctor tomorrow. Also sent B12 and lyme test. Will call if positive. Pt feeling well, pt and family agree with DC and plan of care.

## 2020-03-02 NOTE — ED STATDOCS - OBJECTIVE STATEMENT
30 y/o male with PMHx of anxiety, depression presents to the ED c/o worsening anxiety x1 month. +constant tingling, numbness to fingers x2 days. Pt reports that diet/exercise has been poor over last 5 months during which he has been unemployed. Notes that he has been eating incontinently and ingesting sugary foods. Pt is taking Lamictal, Trazadone, and fluoxitine prescribed by psychiatrist. Notes increase in dose from 50mg to 100mg of Lamictal qd, 4 days ago. Denies SI. Notes that he has had psychiatric admissions in the past. Pt is more concerned about physical sx today in ED at this time. PCP/Dr. Caballero.

## 2020-03-02 NOTE — ED ADULT NURSE NOTE - OBJECTIVE STATEMENT
Pt c/o of tingling sensation on finger tips. Pt states he changed his psych meds and is unsure if that is causing the sx.

## 2020-03-02 NOTE — ED STATDOCS - NSFOLLOWUPINSTRUCTIONS_ED_ALL_ED_FT
Paresthesia  Paresthesia is an abnormal burning or prickling sensation. It is usually felt in the hands, arms, legs, or feet. However, it may occur in any part of the body. Usually, paresthesia is not painful. It may feel like:  Tingling or numbness.Buzzing.Itching.Paresthesia may occur without any clear cause, or it may be caused by:  Breathing too quickly (hyperventilation).Pressure on a nerve.An underlying medical condition.Side effects of a medication.Nutritional deficiencies.Exposure to toxic chemicals.Most people experience temporary (transient) paresthesia at some time in their lives. For some people, it may be long-lasting (chronic) because of an underlying medical condition. If you have paresthesia that lasts a long time, you may need to be evaluated by your health care provider.  Follow these instructions at home:  Alcohol use        Do not drink alcohol if:  Your health care provider tells you not to drink.You are pregnant, may be pregnant, or are planning to become pregnant.If you drink alcohol:  Limit how much you use to:  0–1 drink a day for women.0–2 drinks a day for men.Be aware of how much alcohol is in your drink. In the [place] one drink equals one 12 oz bottle of beer (355 mL), one 5 oz glass of wine (148 mL), or one 1½ oz glass of hard liquor (44 mL).Nutrition        Eat a healthy diet. This includes:  Eating foods that are high in fiber, such as fresh fruits and vegetables, whole grains, and beans.Limiting foods that are high in fat and processed sugars, such as fried or sweet foods.General instructions     Take over-the-counter and prescription medicines only as told by your health care provider.Do not use any products that contain nicotine or tobacco, such as cigarettes and e-cigarettes. These can keep blood from reaching damaged nerves. If you need help quitting, ask your health care provider.If you have diabetes, work closely with your health care provider to keep your blood sugar under control.If you have numbness in your feet:  Check every day for signs of injury or infection. Watch for redness, warmth, and swelling.Wear padded socks and comfortable shoes. These help protect your feet.Keep all follow-up visits as told by your health care provider. This is important.Contact a health care provider if you:  Have paresthesia that gets worse or does not go away.Have a burning or prickling feeling that gets worse when you walk.Have pain, cramps, or dizziness.Develop a rash.Get help right away if you:  Feel weak.Have trouble walking or moving.Have problems with speech, understanding, or vision.Feel confused.Cannot control your bladder or bowel movements.Have numbness after an injury.Develop new weakness in an arm or leg.Faint.Summary  Paresthesia is an abnormal burning or prickling sensation that is usually felt in the hands, arms, legs, or feet. It may also occur in other parts of the body.Paresthesia may occur without any clear cause, or it may be caused by breathing too quickly (hyperventilation), pressure on a nerve, an underlying medical condition, side effects of a medication, nutritional deficiencies, or exposure to toxic chemicals.If you have paresthesia that lasts a long time, you may need to be evaluated by your health care provider.This information is not intended to replace advice given to you by your health care provider. Make sure you discuss any questions you have with your health care provider.     CALL YOUR PSYCHIATRIST FIRST THING IN THE MORNING TO DISCUSS YOUR SYMPTOMS AND YOUR RECENT MEDICATION INCREASE. FOLLOW UP WITH YOUR PRIMARY DOCTOR THIS WEEK. RETURN TO ER FOR ANY WORSENING SYMPTOMS OR NEW CONCERNS.

## 2020-03-02 NOTE — ED STATDOCS - PATIENT PORTAL LINK FT
You can access the FollowMyHealth Patient Portal offered by [facility] by registering at the following website: [email or web address]. By joining [employer]’s FollowMyHealth portal, you will also be able to view your health information using other applications (apps) compatible with our system.

## 2020-03-02 NOTE — ED STATDOCS - ATTENDING CONTRIBUTION TO CARE
I, Marilee Hansen MD,  performed the initial face to face bedside interview with this patient regarding history of present illness, review of symptoms and relevant past medical, social and family history.  I completed an independent physical examination.  I was the initial provider who evaluated this patient. I have signed out the follow up of any pending tests (i.e. labs, radiological studies) to the ACP.  I have communicated the patient’s plan of care and disposition with the ACP.  The history, relevant review of systems, past medical and surgical history, medical decision making, and physical examination was documented by the scribe in my presence and I attest to the accuracy of the documentation.

## 2020-03-02 NOTE — ED ADULT TRIAGE NOTE - CHIEF COMPLAINT QUOTE
pt presents to ED due to anxiety with numbness tingling to BL hands feet pt takes lamictal trazadone  and fluoxitine

## 2020-03-03 LAB
LYME C6 AB IGG/IGM EIA REFLEX WESTERN BL: SIGNIFICANT CHANGE UP
VIT B12 SERPL-MCNC: 1070 PG/ML — SIGNIFICANT CHANGE UP (ref 232–1245)

## 2020-07-30 NOTE — ED ADULT TRIAGE NOTE - SPO2 (%)
Staff to inform the patient that she needs to send the FMLA form .  Message also sent to patient.  
100

## 2020-08-16 ENCOUNTER — TRANSCRIPTION ENCOUNTER (OUTPATIENT)
Age: 30
End: 2020-08-16

## 2020-09-20 ENCOUNTER — EMERGENCY (EMERGENCY)
Facility: HOSPITAL | Age: 30
LOS: 0 days | Discharge: HOME | End: 2020-09-20
Attending: EMERGENCY MEDICINE
Payer: MEDICAID

## 2020-09-20 VITALS
DIASTOLIC BLOOD PRESSURE: 104 MMHG | OXYGEN SATURATION: 98 % | RESPIRATION RATE: 18 BRPM | TEMPERATURE: 98 F | SYSTOLIC BLOOD PRESSURE: 144 MMHG | HEART RATE: 66 BPM

## 2020-09-20 VITALS — HEIGHT: 78 IN | WEIGHT: 179.9 LBS

## 2020-09-20 DIAGNOSIS — Z76.0 ENCOUNTER FOR ISSUE OF REPEAT PRESCRIPTION: ICD-10-CM

## 2020-09-20 DIAGNOSIS — F32.9 MAJOR DEPRESSIVE DISORDER, SINGLE EPISODE, UNSPECIFIED: ICD-10-CM

## 2020-09-20 DIAGNOSIS — F41.9 ANXIETY DISORDER, UNSPECIFIED: ICD-10-CM

## 2020-09-20 PROCEDURE — 99283 EMERGENCY DEPT VISIT LOW MDM: CPT

## 2020-09-20 PROCEDURE — 99281 EMR DPT VST MAYX REQ PHY/QHP: CPT

## 2020-09-20 RX ORDER — LAMOTRIGINE 25 MG/1
1 TABLET, ORALLY DISINTEGRATING ORAL
Qty: 7 | Refills: 0
Start: 2020-09-20 | End: 2020-11-09

## 2020-09-20 RX ORDER — FLUOXETINE HCL 10 MG
52 CAPSULE ORAL
Qty: 728 | Refills: 0
Start: 2020-09-20 | End: 2020-09-26

## 2020-09-20 RX ORDER — FLUOXETINE HCL 10 MG
1 CAPSULE ORAL
Qty: 14 | Refills: 0
Start: 2020-09-20 | End: 2020-10-17

## 2020-09-20 RX ORDER — FLUOXETINE HCL 10 MG
1 CAPSULE ORAL
Qty: 14 | Refills: 0
Start: 2020-09-20 | End: 2020-09-26

## 2020-09-20 RX ORDER — LAMOTRIGINE 25 MG/1
1 TABLET, ORALLY DISINTEGRATING ORAL
Qty: 7 | Refills: 0
Start: 2020-09-20 | End: 2020-09-26

## 2020-09-20 NOTE — ED STATDOCS - NSFOLLOWUPINSTRUCTIONS_ED_ALL_ED_FT
Medicine Refill    WHAT YOU NEED TO KNOW:    You may have been given a prescription in the emergency department for a few days of your medicine. It is important to refill your medicine before you completely run out. You need to follow up with your healthcare provider for a full prescription within the next few days. You will not be given additional refills in the emergency department.     DISCHARGE INSTRUCTIONS:    Follow up with your healthcare provider: Contact your healthcare provider before you are completely out of medicine. Write down your questions so you remember to ask them during your visits.     Refill tips: Your medicine will treat your condition if you take the medicine regularly. Prevent missed doses by doing the following:   •Keep a chart of your medicine. Include all of your current medicines. Write down the name and strength of each medicine, the prescription number, and the number of refills. Also write down the dates of your refills. Ask your pharmacy or insurance provider for other ways to help you keep track of your medicines.      •Refill medicines a few days before you run out. This will decrease any problems that will prevent you from getting your medicines on time. Problems include a closed pharmacy, or the pharmacy may have to contact your healthcare provider.      •If you know you are going to be traveling, refill your medicines before you leave. You may not be able to get refills if you do not use your local pharmacy. You may need to call your insurance provider to make them aware of your travels.

## 2020-09-20 NOTE — ED STATDOCS - PATIENT PORTAL LINK FT
You can access the FollowMyHealth Patient Portal offered by [facility] by registering at the following website: [email or web address]. By joining [employer]’s FollowMyHealth portal, you will also be able to view your health information using other applications (apps) compatible with our system.

## 2020-09-20 NOTE — ED STATDOCS - CLINICAL SUMMARY MEDICAL DECISION MAKING FREE TEXT BOX
ran out of psych meds no si or hi has an appt later this week with [employer] [facility]. pt is mildly anxious and told to follow up with his pcp. ran out of psych meds no si or hi has an appt later this week with [employer] [facility]. pt is mildly anxious and told to follow up with his pcp.      Will DC with refill of medications x 1 week.  Madelaine Kee PA-C

## 2020-09-20 NOTE — ED STATDOCS - NSFOLLOWUPCLINICS_GEN_ALL_ED_FT
[facility]  Family Medicine  [address] [place] [address]  Phone: [phone]  Fax:   Follow Up Time:

## 2020-09-20 NOTE — ED STATDOCS - OBJECTIVE STATEMENT
30 y/o male with pmhx of depression and anxiety presents to the ED for med refill.  Pt hasn't had his psych medication In about 4 days. Pt takes 150mg lamotrigine 1 tab a day, Fluoxetine 40mg 1 tab a day. Pt has an appt with [employer] [facility] later on this week for his medication to get refilled but he came in today because he knows hes going to start to feel different if he doesn't get a refill soon. Currently no SI/HI.

## 2020-09-20 NOTE — ED STATDOCS - ATTENDING CONTRIBUTION TO CARE
I, Kaye Beltran MD, performed the initial face to face bedside interview with this patient regarding history of present illness, review of symptoms and relevant past medical, social and family history.  I completed an independent physical examination.  I was the initial provider who evaluated this patient. I have signed out the follow up of any pending tests (i.e. labs, radiological studies) to the ACP.  I have communicated the patient’s plan of care and disposition with the ACP.  The history, relevant review of systems, past medical and surgical history, medical decision making, and physical examination was documented by the scribe in my presence and I attest to the accuracy of the documentation.

## 2020-10-11 ENCOUNTER — EMERGENCY (EMERGENCY)
Facility: HOSPITAL | Age: 30
LOS: 0 days | Discharge: HOME | End: 2020-10-11
Attending: STUDENT IN AN ORGANIZED HEALTH CARE EDUCATION/TRAINING PROGRAM
Payer: MEDICAID

## 2020-10-11 VITALS
OXYGEN SATURATION: 98 % | DIASTOLIC BLOOD PRESSURE: 80 MMHG | HEART RATE: 69 BPM | RESPIRATION RATE: 16 BRPM | SYSTOLIC BLOOD PRESSURE: 136 MMHG | TEMPERATURE: 98 F

## 2020-10-11 VITALS — WEIGHT: 179.9 LBS | HEIGHT: 78 IN

## 2020-10-11 DIAGNOSIS — Z76.0 ENCOUNTER FOR ISSUE OF REPEAT PRESCRIPTION: ICD-10-CM

## 2020-10-11 DIAGNOSIS — Z79.899 OTHER LONG TERM (CURRENT) DRUG THERAPY: ICD-10-CM

## 2020-10-11 DIAGNOSIS — F33.42 MAJOR DEPRESSIVE DISORDER, RECURRENT, IN FULL REMISSION: ICD-10-CM

## 2020-10-11 PROCEDURE — 99284 EMERGENCY DEPT VISIT MOD MDM: CPT

## 2020-10-11 PROCEDURE — 99283 EMERGENCY DEPT VISIT LOW MDM: CPT

## 2020-10-11 PROCEDURE — 99285 EMERGENCY DEPT VISIT HI MDM: CPT

## 2020-10-11 RX ORDER — LAMOTRIGINE 25 MG/1
2 TABLET, ORALLY DISINTEGRATING ORAL
Qty: 42 | Refills: 0
Start: 2020-10-11 | End: 2020-10-31

## 2020-10-11 NOTE — ED ADULT TRIAGE NOTE - CHIEF COMPLAINT QUOTE
pt states he is here for a medication refill for floxetine and lamotrigine. pt states he has one left of the first but ran out of lamictal 1 week ago. pt is in a group therapy program but missed a visit and now has to reapply which is cuasing a lapse in medication refills through normal PCP.

## 2020-10-11 NOTE — ED STATDOCS - ATTENDING CONTRIBUTION TO CARE
I, Reva Mccurdy DO,  performed the initial face to face bedside interview with this patient regarding history of present illness, review of symptoms and relevant past medical, social and family history.  I completed an independent physical examination.  I was the initial provider who evaluated this patient. I have signed out the follow up of any pending tests (i.e. labs, radiological studies) to the ACP.  I have communicated the patient’s plan of care and disposition with the ACP.  The history, relevant review of systems, past medical and surgical history, medical decision making, and physical examination was documented by the scribe in my presence and I attest to the accuracy of the documentation.

## 2020-10-11 NOTE — ED BEHAVIORAL HEALTH ASSESSMENT NOTE - SUMMARY
29 year-old  male, with history of depression and anxiety, with prior in-patient hospitalization (2), with no prior suicidal ideation or suicide attempt, presenting self-referred for medication refill in the setting of not being linked with psychiatrist.    Patient presenting with depression in remission. Patient is medication compliant however need refill until he links with out-patient psychiatrist at [facility]. Otherwise denying suicidal ideation/intent/plan. Patient has no manic / psychotic symptoms. No delusions elicited. Patient has therapeutic relationships and social supports. Patient is future oriented. Patient engaged in safety planning. Patient symptoms not indicating imminent risk for harm to self; not warranting involuntary in-patient hospitalization.

## 2020-10-11 NOTE — ED BEHAVIORAL HEALTH ASSESSMENT NOTE - RISK ASSESSMENT
LOW RISK     ACUTE RISK FACTORS: not linked with psychiatrist    CHRONIC RISK FACTORS: depression and anxiety, history of in-patient hospitalization     PROTECTIVE FACTORS: no suicidal ideation/intent/plan, future oriented, supportive family, engaged in safety planning. Low Acute Suicide Risk

## 2020-10-11 NOTE — ED STATDOCS - CARE PLAN
Principal Discharge DX:	Depression, unspecified depression type  Secondary Diagnosis:	Medication refill

## 2020-10-11 NOTE — ED STATDOCS - PATIENT PORTAL LINK FT
You can access the FollowMyHealth Patient Portal offered by [facility] by registering at the following website: [email or web address]. By joining [facility]’s FollowMyHealth portal, you will also be able to view your health information using other applications (apps) compatible with our system.

## 2020-10-11 NOTE — ED STATDOCS - PROGRESS NOTE DETAILS
28 y/o M with PMH of depression, anxiety presents for medication refill. Pt takes fluoxetine and lamotrigine daily. States he was discharged from his prior psychiatrist, in process of finding new provider. States he is unable to procure refills. No acute complaints including SI/HI, AH/VH. PE: Well appearing. Cardiac: s1s2, RRR. Lungs: CTAB. Abdomen: NBS x4, soft, nontender. A/P: Medication refill, psych consult. - Mo Morris PA-C Pt evaluated by psych. Left department prior to signing discharge papers. - Mo Morris PA-C

## 2020-10-11 NOTE — ED STATDOCS - OBJECTIVE STATEMENT
30 y/o male with pmhx of depression, anxiety presents to the ED for medication refill for Fluoxetine 40mg 1 tab a day and Lamotrigine 150mg 1 tab a day. 28 y/o male with pmhx of depression, anxiety presents to the ED for medication refill for Fluoxetine 40mg 1 tab a day and Lamotrigine 150mg 1 tab a day. Pt sees a psychiatrist but is in the middle of switching drs. No other complaints at this time.

## 2020-10-11 NOTE — ED BEHAVIORAL HEALTH ASSESSMENT NOTE - SUICIDE PROTECTIVE FACTORS
Responsibility to family and others/Ability to cope with stress/Identifies reasons for living/Has future plans/Supportive social network of family or friends/Positive therapeutic relationships

## 2020-10-21 RX ORDER — LAMOTRIGINE 25 MG/1
1 TABLET, ORALLY DISINTEGRATING ORAL
Qty: 15 | Refills: 1
Start: 2020-10-21 | End: 2020-11-19

## 2020-10-21 RX ORDER — FLUOXETINE HCL 10 MG
2 CAPSULE ORAL
Qty: 30 | Refills: 1
Start: 2020-10-21 | End: 2020-11-19

## 2020-10-21 RX ORDER — FLUOXETINE HCL 10 MG
2 CAPSULE ORAL
Qty: 60 | Refills: 1
Start: 2020-10-21 | End: 2021-01-01

## 2020-11-03 RX ORDER — LAMOTRIGINE 25 MG/1
1 TABLET, ORALLY DISINTEGRATING ORAL
Qty: 30 | Refills: 1
Start: 2020-11-03 | End: 2021-01-01

## 2020-11-03 NOTE — ED BEHAVIORAL HEALTH NOTE - BEHAVIORAL HEALTH NOTE
After receiving email from KARLIE Holman requesting SW follow up,  as pt was having trouble linking to [facility] following ED BH Assessment 5 to 6 weeks ago and pt contacted him for a med refill. ENEDINA briefly discussed case with psych SW supervisor.  ENEDINA then called patient. Per pt, after being discharged from [facility] he has been having trouble re-linking to [facility]. SW offered to make referral to [facility] in [place], and pt receptive and verbally consented to referral being made. While patient agreeable to referral to [facility], he also indicated he was interested in an alternative provider.  SW also discussed [facility] as a resource and provided [facility] info. SW provided patient with contact info for [facility] for medication management, which patient agreed to follow up with on his own. Pt reported he was not interested in therapy at this time. SW discussed [facility] as a resource in the event that pt changed mind if he went with [facility] for med management, and pt accepted contact info. Following conversation with patient, ENEDINA made appointment using [facility] for 11/6 at 11AM for an intake with Nakia Vincent LMSW. Patient then sent clinicals for referral via fax. Secure email also sent to [facility]. No additional SW needs verbalized at this time by patient. SW provided her contact info to patient if needed.

## 2020-12-30 RX ORDER — FLUOXETINE HCL 10 MG
2 CAPSULE ORAL
Qty: 60 | Refills: 2
Start: 2020-12-30 | End: 2021-03-29

## 2020-12-30 RX ORDER — LAMOTRIGINE 25 MG/1
1 TABLET, ORALLY DISINTEGRATING ORAL
Qty: 30 | Refills: 2
Start: 2020-12-30 | End: 2021-03-29

## 2021-05-21 NOTE — ED CLERICAL - NS ED CLERK UNITS
ED HOLD 3N/303 [General Appearance - Well Developed] : well developed [Normal Appearance] : normal appearance [Well Groomed] : well groomed [General Appearance - Well Nourished] : well nourished [No Deformities] : no deformities [General Appearance - In No Acute Distress] : no acute distress [Normal Conjunctiva] : the conjunctiva exhibited no abnormalities [Eyelids - No Xanthelasma] : the eyelids demonstrated no xanthelasmas [Normal Oral Mucosa] : normal oral mucosa [No Oral Pallor] : no oral pallor [No Oral Cyanosis] : no oral cyanosis [Normal Jugular Venous A Waves Present] : normal jugular venous A waves present [Normal Jugular Venous V Waves Present] : normal jugular venous V waves present [No Jugular Venous Rdz A Waves] : no jugular venous rdz A waves [Heart Rate And Rhythm] : heart rate and rhythm were normal [Heart Sounds] : normal S1 and S2 [Murmurs] : no murmurs present [Edema] : no peripheral edema present [Respiration, Rhythm And Depth] : normal respiratory rhythm and effort [Exaggerated Use Of Accessory Muscles For Inspiration] : no accessory muscle use [Auscultation Breath Sounds / Voice Sounds] : lungs were clear to auscultation bilaterally [Bowel Sounds] : normal bowel sounds [Abdomen Soft] : soft [Abdomen Tenderness] : non-tender [Abdomen Mass (___ Cm)] : no abdominal mass palpated [Abnormal Walk] : normal gait [Gait - Sufficient For Exercise Testing] : the gait was sufficient for exercise testing [Nail Clubbing] : no clubbing of the fingernails [Cyanosis, Localized] : no localized cyanosis [Petechial Hemorrhages (___cm)] : no petechial hemorrhages [Skin Color & Pigmentation] : normal skin color and pigmentation [] : no rash [No Venous Stasis] : no venous stasis [Skin Lesions] : no skin lesions [No Skin Ulcers] : no skin ulcer [No Xanthoma] : no  xanthoma was observed [Oriented To Time, Place, And Person] : oriented to person, place, and time [Affect] : the affect was normal [Mood] : the mood was normal [No Anxiety] : not feeling anxious [FreeTextEntry1] : L pedal pulses 1+. R DP 2+, PT 1+

## 2021-07-03 ENCOUNTER — TRANSCRIPTION ENCOUNTER (OUTPATIENT)
Age: 31
End: 2021-07-03

## 2021-07-03 ENCOUNTER — INPATIENT (INPATIENT)
Facility: HOSPITAL | Age: 31
LOS: 0 days | Discharge: HOME | DRG: 114 | End: 2021-07-03
Attending: SURGERY | Admitting: SURGERY
Payer: MEDICAID

## 2021-07-03 VITALS
HEART RATE: 72 BPM | OXYGEN SATURATION: 98 % | DIASTOLIC BLOOD PRESSURE: 68 MMHG | RESPIRATION RATE: 16 BRPM | SYSTOLIC BLOOD PRESSURE: 130 MMHG

## 2021-07-03 VITALS
OXYGEN SATURATION: 99 % | HEART RATE: 65 BPM | TEMPERATURE: 98 F | DIASTOLIC BLOOD PRESSURE: 93 MMHG | WEIGHT: 184.97 LBS | RESPIRATION RATE: 20 BRPM | HEIGHT: 73 IN | SYSTOLIC BLOOD PRESSURE: 143 MMHG

## 2021-07-03 DIAGNOSIS — S01.81XA LACERATION WITHOUT FOREIGN BODY OF OTHER PART OF HEAD, INITIAL ENCOUNTER: ICD-10-CM

## 2021-07-03 DIAGNOSIS — S01.511A LACERATION WITHOUT FOREIGN BODY OF LIP, INITIAL ENCOUNTER: ICD-10-CM
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
## 2021-07-03 DIAGNOSIS — S06.0X1A CONCUSSION WITH LOSS OF CONSCIOUSNESS OF 30 MINUTES OR LESS, INITIAL ENCOUNTER: ICD-10-CM

## 2021-07-03 DIAGNOSIS — S01.111A LACERATION WITHOUT FOREIGN BODY OF RIGHT EYELID AND PERIOCULAR AREA, INITIAL ENCOUNTER: ICD-10-CM

## 2021-07-03 DIAGNOSIS — S02.2XXA FRACTURE OF NASAL BONES, INITIAL ENCOUNTER FOR CLOSED FRACTURE: ICD-10-CM

## 2021-07-03 DIAGNOSIS — F41.9 ANXIETY DISORDER, UNSPECIFIED: ICD-10-CM
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
## 2021-07-03 DIAGNOSIS — F10.929 ALCOHOL USE, UNSPECIFIED WITH INTOXICATION, UNSPECIFIED: ICD-10-CM

## 2021-07-03 DIAGNOSIS — F32.9 MAJOR DEPRESSIVE DISORDER, SINGLE EPISODE, UNSPECIFIED: ICD-10-CM

## 2021-07-03 LAB
ALBUMIN SERPL ELPH-MCNC: 4 G/DL — SIGNIFICANT CHANGE UP (ref 3.3–5)
ALP SERPL-CCNC: 80 U/L — SIGNIFICANT CHANGE UP (ref 40–120)
ALT FLD-CCNC: 61 U/L — SIGNIFICANT CHANGE UP (ref 12–78)
ANION GAP SERPL CALC-SCNC: 5 MMOL/L — SIGNIFICANT CHANGE UP (ref 5–17)
ANION GAP SERPL CALC-SCNC: 6 MMOL/L — SIGNIFICANT CHANGE UP (ref 5–17)
APPEARANCE UR: CLEAR — SIGNIFICANT CHANGE UP
APTT BLD: 32.7 SEC — SIGNIFICANT CHANGE UP (ref 27.5–35.5)
AST SERPL-CCNC: 90 U/L — HIGH (ref 15–37)
BASOPHILS # BLD AUTO: 0.02 K/UL — SIGNIFICANT CHANGE UP (ref 0–0.2)
BASOPHILS # BLD AUTO: 0.02 K/UL — SIGNIFICANT CHANGE UP (ref 0–0.2)
BASOPHILS NFR BLD AUTO: 0.3 % — SIGNIFICANT CHANGE UP (ref 0–2)
BASOPHILS NFR BLD AUTO: 0.3 % — SIGNIFICANT CHANGE UP (ref 0–2)
BILIRUB SERPL-MCNC: 0.2 MG/DL — SIGNIFICANT CHANGE UP (ref 0.2–1.2)
BILIRUB UR-MCNC: NEGATIVE — SIGNIFICANT CHANGE UP
BUN SERPL-MCNC: 10 MG/DL — SIGNIFICANT CHANGE UP (ref 7–23)
BUN SERPL-MCNC: 11 MG/DL — SIGNIFICANT CHANGE UP (ref 7–23)
CALCIUM SERPL-MCNC: 8.4 MG/DL — LOW (ref 8.5–10.1)
CALCIUM SERPL-MCNC: 8.5 MG/DL — SIGNIFICANT CHANGE UP (ref 8.5–10.1)
CHLORIDE SERPL-SCNC: 103 MMOL/L — SIGNIFICANT CHANGE UP (ref 96–108)
CHLORIDE SERPL-SCNC: 105 MMOL/L — SIGNIFICANT CHANGE UP (ref 96–108)
CO2 SERPL-SCNC: 28 MMOL/L — SIGNIFICANT CHANGE UP (ref 22–31)
CO2 SERPL-SCNC: 30 MMOL/L — SIGNIFICANT CHANGE UP (ref 22–31)
COLOR SPEC: YELLOW — SIGNIFICANT CHANGE UP
CREAT SERPL-MCNC: 0.73 MG/DL — SIGNIFICANT CHANGE UP (ref 0.5–1.3)
CREAT SERPL-MCNC: 0.93 MG/DL — SIGNIFICANT CHANGE UP (ref 0.5–1.3)
DIFF PNL FLD: NEGATIVE — SIGNIFICANT CHANGE UP
EOSINOPHIL # BLD AUTO: 0.05 K/UL — SIGNIFICANT CHANGE UP (ref 0–0.5)
EOSINOPHIL # BLD AUTO: 0.09 K/UL — SIGNIFICANT CHANGE UP (ref 0–0.5)
EOSINOPHIL NFR BLD AUTO: 0.9 % — SIGNIFICANT CHANGE UP (ref 0–6)
EOSINOPHIL NFR BLD AUTO: 1.4 % — SIGNIFICANT CHANGE UP (ref 0–6)
ETHANOL SERPL-MCNC: 213 MG/DL — HIGH (ref 0–10)
GLUCOSE SERPL-MCNC: 93 MG/DL — SIGNIFICANT CHANGE UP (ref 70–99)
GLUCOSE SERPL-MCNC: 99 MG/DL — SIGNIFICANT CHANGE UP (ref 70–99)
GLUCOSE UR QL: NEGATIVE MG/DL — SIGNIFICANT CHANGE UP
HCT VFR BLD CALC: 38.3 % — LOW (ref 39–50)
HCT VFR BLD CALC: 38.8 % — LOW (ref 39–50)
HGB BLD-MCNC: 13 G/DL — SIGNIFICANT CHANGE UP (ref 13–17)
HGB BLD-MCNC: 13.1 G/DL — SIGNIFICANT CHANGE UP (ref 13–17)
IMM GRANULOCYTES NFR BLD AUTO: 0.2 % — SIGNIFICANT CHANGE UP (ref 0–1.5)
IMM GRANULOCYTES NFR BLD AUTO: 0.3 % — SIGNIFICANT CHANGE UP (ref 0–1.5)
INR BLD: 0.96 RATIO — SIGNIFICANT CHANGE UP (ref 0.88–1.16)
KETONES UR-MCNC: NEGATIVE — SIGNIFICANT CHANGE UP
LACTATE SERPL-SCNC: 1.7 MMOL/L — SIGNIFICANT CHANGE UP (ref 0.7–2)
LEUKOCYTE ESTERASE UR-ACNC: NEGATIVE — SIGNIFICANT CHANGE UP
LIDOCAIN IGE QN: 68 U/L — LOW (ref 73–393)
LYMPHOCYTES # BLD AUTO: 1.7 K/UL — SIGNIFICANT CHANGE UP (ref 1–3.3)
LYMPHOCYTES # BLD AUTO: 2.21 K/UL — SIGNIFICANT CHANGE UP (ref 1–3.3)
LYMPHOCYTES # BLD AUTO: 29 % — SIGNIFICANT CHANGE UP (ref 13–44)
LYMPHOCYTES # BLD AUTO: 33.3 % — SIGNIFICANT CHANGE UP (ref 13–44)
MCHC RBC-ENTMCNC: 30 PG — SIGNIFICANT CHANGE UP (ref 27–34)
MCHC RBC-ENTMCNC: 30.2 PG — SIGNIFICANT CHANGE UP (ref 27–34)
MCHC RBC-ENTMCNC: 33.8 GM/DL — SIGNIFICANT CHANGE UP (ref 32–36)
MCHC RBC-ENTMCNC: 33.9 GM/DL — SIGNIFICANT CHANGE UP (ref 32–36)
MCV RBC AUTO: 88.9 FL — SIGNIFICANT CHANGE UP (ref 80–100)
MCV RBC AUTO: 89 FL — SIGNIFICANT CHANGE UP (ref 80–100)
MONOCYTES # BLD AUTO: 0.46 K/UL — SIGNIFICANT CHANGE UP (ref 0–0.9)
MONOCYTES # BLD AUTO: 0.56 K/UL — SIGNIFICANT CHANGE UP (ref 0–0.9)
MONOCYTES NFR BLD AUTO: 7.8 % — SIGNIFICANT CHANGE UP (ref 2–14)
MONOCYTES NFR BLD AUTO: 8.4 % — SIGNIFICANT CHANGE UP (ref 2–14)
NEUTROPHILS # BLD AUTO: 3.62 K/UL — SIGNIFICANT CHANGE UP (ref 1.8–7.4)
NEUTROPHILS # BLD AUTO: 3.74 K/UL — SIGNIFICANT CHANGE UP (ref 1.8–7.4)
NEUTROPHILS NFR BLD AUTO: 56.4 % — SIGNIFICANT CHANGE UP (ref 43–77)
NEUTROPHILS NFR BLD AUTO: 61.7 % — SIGNIFICANT CHANGE UP (ref 43–77)
NITRITE UR-MCNC: NEGATIVE — SIGNIFICANT CHANGE UP
PCP SPEC-MCNC: SIGNIFICANT CHANGE UP
PH UR: 5 — SIGNIFICANT CHANGE UP (ref 5–8)
PLATELET # BLD AUTO: 253 K/UL — SIGNIFICANT CHANGE UP (ref 150–400)
PLATELET # BLD AUTO: 271 K/UL — SIGNIFICANT CHANGE UP (ref 150–400)
POTASSIUM SERPL-MCNC: 3.6 MMOL/L — SIGNIFICANT CHANGE UP (ref 3.5–5.3)
POTASSIUM SERPL-MCNC: 3.7 MMOL/L — SIGNIFICANT CHANGE UP (ref 3.5–5.3)
POTASSIUM SERPL-SCNC: 3.6 MMOL/L — SIGNIFICANT CHANGE UP (ref 3.5–5.3)
POTASSIUM SERPL-SCNC: 3.7 MMOL/L — SIGNIFICANT CHANGE UP (ref 3.5–5.3)
PROT SERPL-MCNC: 7.2 GM/DL — SIGNIFICANT CHANGE UP (ref 6–8.3)
PROT UR-MCNC: NEGATIVE MG/DL — SIGNIFICANT CHANGE UP
PROTHROM AB SERPL-ACNC: 11.3 SEC — SIGNIFICANT CHANGE UP (ref 10.6–13.6)
RBC # BLD: 4.31 M/UL — SIGNIFICANT CHANGE UP (ref 4.2–5.8)
RBC # BLD: 4.36 M/UL — SIGNIFICANT CHANGE UP (ref 4.2–5.8)
RBC # FLD: 13.2 % — SIGNIFICANT CHANGE UP (ref 10.3–14.5)
RBC # FLD: 13.2 % — SIGNIFICANT CHANGE UP (ref 10.3–14.5)
SARS-COV-2 RNA SPEC QL NAA+PROBE: SIGNIFICANT CHANGE UP
SODIUM SERPL-SCNC: 138 MMOL/L — SIGNIFICANT CHANGE UP (ref 135–145)
SODIUM SERPL-SCNC: 139 MMOL/L — SIGNIFICANT CHANGE UP (ref 135–145)
SP GR SPEC: 1.01 — SIGNIFICANT CHANGE UP (ref 1.01–1.02)
UROBILINOGEN FLD QL: NEGATIVE MG/DL — SIGNIFICANT CHANGE UP
WBC # BLD: 5.87 K/UL — SIGNIFICANT CHANGE UP (ref 3.8–10.5)
WBC # BLD: 6.63 K/UL — SIGNIFICANT CHANGE UP (ref 3.8–10.5)
WBC # FLD AUTO: 5.87 K/UL — SIGNIFICANT CHANGE UP (ref 3.8–10.5)
WBC # FLD AUTO: 6.63 K/UL — SIGNIFICANT CHANGE UP (ref 3.8–10.5)

## 2021-07-03 PROCEDURE — 70486 CT MAXILLOFACIAL W/O DYE: CPT | Mod: 26,MA

## 2021-07-03 PROCEDURE — 71045 X-RAY EXAM CHEST 1 VIEW: CPT | Mod: 26

## 2021-07-03 PROCEDURE — 72141 MRI NECK SPINE W/O DYE: CPT

## 2021-07-03 PROCEDURE — 74177 CT ABD & PELVIS W/CONTRAST: CPT | Mod: 26,MA

## 2021-07-03 PROCEDURE — 83605 ASSAY OF LACTIC ACID: CPT

## 2021-07-03 PROCEDURE — 93010 ELECTROCARDIOGRAM REPORT: CPT

## 2021-07-03 PROCEDURE — U0005: CPT

## 2021-07-03 PROCEDURE — 72141 MRI NECK SPINE W/O DYE: CPT | Mod: 26

## 2021-07-03 PROCEDURE — 76376 3D RENDER W/INTRP POSTPROCES: CPT | Mod: 26

## 2021-07-03 PROCEDURE — 36415 COLL VENOUS BLD VENIPUNCTURE: CPT

## 2021-07-03 PROCEDURE — U0003: CPT

## 2021-07-03 PROCEDURE — 99285 EMERGENCY DEPT VISIT HI MDM: CPT

## 2021-07-03 PROCEDURE — 70450 CT HEAD/BRAIN W/O DYE: CPT | Mod: 26,MA

## 2021-07-03 PROCEDURE — 80048 BASIC METABOLIC PNL TOTAL CA: CPT

## 2021-07-03 PROCEDURE — 85025 COMPLETE CBC W/AUTO DIFF WBC: CPT

## 2021-07-03 PROCEDURE — 72125 CT NECK SPINE W/O DYE: CPT | Mod: 26,MA

## 2021-07-03 PROCEDURE — 71260 CT THORAX DX C+: CPT | Mod: 26,MA

## 2021-07-03 PROCEDURE — G0378: CPT

## 2021-07-03 PROCEDURE — 99221 1ST HOSP IP/OBS SF/LOW 40: CPT

## 2021-07-03 PROCEDURE — 90715 TDAP VACCINE 7 YRS/> IM: CPT

## 2021-07-03 RX ORDER — SODIUM CHLORIDE 9 MG/ML
1000 INJECTION INTRAMUSCULAR; INTRAVENOUS; SUBCUTANEOUS
Refills: 0 | Status: DISCONTINUED | OUTPATIENT
Start: 2021-07-03 | End: 2021-07-03

## 2021-07-03 RX ORDER — SODIUM CHLORIDE 9 MG/ML
1000 INJECTION INTRAMUSCULAR; INTRAVENOUS; SUBCUTANEOUS ONCE
Refills: 0 | Status: COMPLETED | OUTPATIENT
Start: 2021-07-03 | End: 2021-07-03

## 2021-07-03 RX ORDER — TETANUS TOXOID, REDUCED DIPHTHERIA TOXOID AND ACELLULAR PERTUSSIS VACCINE, ADSORBED 5; 2.5; 8; 8; 2.5 [IU]/.5ML; [IU]/.5ML; UG/.5ML; UG/.5ML; UG/.5ML
0.5 SUSPENSION INTRAMUSCULAR ONCE
Refills: 0 | Status: COMPLETED | OUTPATIENT
Start: 2021-07-03 | End: 2021-07-03

## 2021-07-03 RX ADMIN — TETANUS TOXOID, REDUCED DIPHTHERIA TOXOID AND ACELLULAR PERTUSSIS VACCINE, ADSORBED 0.5 MILLILITER(S): 5; 2.5; 8; 8; 2.5 SUSPENSION INTRAMUSCULAR at 06:41

## 2021-07-03 RX ADMIN — SODIUM CHLORIDE 100 MILLILITER(S): 9 INJECTION INTRAMUSCULAR; INTRAVENOUS; SUBCUTANEOUS at 10:46

## 2021-07-03 RX ADMIN — SODIUM CHLORIDE 1000 MILLILITER(S): 9 INJECTION INTRAMUSCULAR; INTRAVENOUS; SUBCUTANEOUS at 06:43
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
## 2021-07-03 NOTE — CHART NOTE - NSCHARTNOTEFT_GEN_A_CORE
MRI of cervical spine reviewed with Dr Phillip. No acute findings. No need for hard collar  Soft collar for comfort if patient uncomfortable  Patient not currently having neck pain or radicular pain  may be discharged from neurosurgical standpoint  plan dw patient who wants to go home. plan also dw trauma team
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
## 2021-07-03 NOTE — DISCHARGE NOTE PROVIDER - CARE PROVIDER_API CALL
Miguel Antoine  SURGERY  [address] [place] [address]  Phone: [phone]  Fax: [phone]  Established Patient  Follow Up Time:     Margarita Crawley)  Plastic Surgery  [address], [address]  [place] [address]  Phone: [phone]  Fax: [phone]  Established Patient  Follow Up Time:

## 2021-07-03 NOTE — ED ADULT NURSE REASSESSMENT NOTE - NS ED NURSE REASSESS COMMENT FT1
received report from dora quiroz, pt at MRI received report from dora quiroz, pt at MRI    pt returned from  pm. pt ordered lunch, vitals stable, awaiting results
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
## 2021-07-03 NOTE — ED PROVIDER NOTE - MUSCULOSKELETAL, MLM
Spine appears normal, range of motion is not limited, no muscle or joint tenderness. FROM x 4. 5/5 strength on flexion and extension of all limbs. GCS=13, couldn't assess NIH. CNs 2-12 grossly intact appearing. No nuchal rigidity.

## 2021-07-03 NOTE — DISCHARGE NOTE NURSING/CASE MANAGEMENT/SOCIAL WORK - NSDCVIVACCINE_GEN_ALL_CORE_FT
Tdap; 03-Jul-2021 06:41; Teo Peterson (RN); [employer]; [id number] (Exp. Date: 25-Nov-2022); IntraMuscular; Deltoid Left.; 0.5 milliLiter(s); VIS (VIS Published: 09-May-2013, VIS Presented: 03-Jul-2021);

## 2021-07-03 NOTE — ED ADULT NURSE NOTE - NSIMPLEMENTINTERV_GEN_ALL_ED
Implemented All Fall Risk Interventions:  [place] to call system. Call bell, personal items and telephone within reach. Instruct patient to call for assistance. Room bathroom lighting operational. Non-slip footwear when patient is off stretcher. Physically safe environment: no spills, clutter or unnecessary equipment. Stretcher in lowest position, wheels locked, appropriate side rails in place. Provide visual cue, wrist band, yellow gown, etc. Monitor gait and stability. Monitor for mental status changes and reorient to person, place, and time. Review medications for side effects contributing to fall risk. Reinforce activity limits and safety measures with patient and family.
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
## 2021-07-03 NOTE — DISCHARGE NOTE NURSING/CASE MANAGEMENT/SOCIAL WORK - PATIENT PORTAL LINK FT
You can access the FollowMyHealth Patient Portal offered by [facility] by registering at the following website: [email or web address]. By joining [facility]’s FollowMyHealth portal, you will also be able to view your health information using other applications (apps) compatible with our system.
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
## 2021-07-03 NOTE — H&P ADULT - NSHPPHYSICALEXAM_GEN_ALL_CORE
CONSTITUTIONAL: EOB, A & O x 3 but drowsy and keeps falling alseep unable to aswer all questions  HEAD:  deformity noted of nose, lacerations noted between eyebrows and under nosed abour 1 cm  EYES: EOMI, PERRLA, conjunctiva and sclera clear  ENMT: No tonsillar erythema, exudates, or enlargement; Moist mucous membranes, Good dentition, No lesions  NECK: Supple, No JVD, Normal thyroid, in a color  NERVOUS SYSTEM:  Alert & Oriented X3, Good concentration; Motor Strength 5/5 B/L upper and lower extremities; DTRs 2+ intact and symmetric  CHEST/LUNG: Clear to percussion bilaterally; No rales, rhonchi, wheezing, or rubs  HEART: Regular rate and rhythm; No murmurs, rubs, or gallops  ABDOMEN: Soft, Nontender, Nondistended; Bowel sounds present  EXTREMITIES:  2+ Peripheral Pulses, No clubbing, cyanosis, or edema  LYMPH: No lymphadenopathy noted  SKIN: No rashes or lesions
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
## 2021-07-03 NOTE — ED PROVIDER NOTE - NEUROLOGICAL, MLM
Initially altered, unable to provide name, or orient, after a few minutes, is oriented to self, place and time.

## 2021-07-03 NOTE — DISCHARGE NOTE PROVIDER - NSDCMRMEDTOKEN_GEN_ALL_CORE_FT
FLUoxetine 10 mg oral tablet: 1 tab(s) orally once a day   FLUoxetine 20 mg oral tablet: 1 tab(s) orally once a day   FLUoxetine 40 mg oral capsule: 2 cap(s) orally once a day   LaMICtal 150 mg oral tablet: 1 tab(s) orally once a day   LaMICtal 25 mg oral tablet: 2 tab(s) orally once a day   oxycodone-acetaminophen 5 mg-325 mg oral tablet: 1 tab(s) orally every 6 hours, As Needed MDD:6 tabs  PROzac: 30 milligram(s) orally once a day

## 2021-07-03 NOTE — ED PROVIDER NOTE - SKIN, MLM
Skin normal color for race, warm, dry and intact. Multiple abrasions, and small lacerations to face/lip.
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
## 2021-07-03 NOTE — ED PROVIDER NOTE - ENMT, MLM
Airway patent, deformity of the nose, epistaxis from both nares, dried blood in the mouth, no FB in the mouth, EOMI seems intact. Facial swelling and bruising.

## 2021-07-03 NOTE — ED ADULT NURSE REASSESSMENT NOTE - NS ED NURSE REASSESS COMMENT FT1
Patient with increased lethargy while in room in the Ed. Patient was upgraded to a code trauma as per Bello Tesfaye at 0412 for change in mental status. Pt initially awake alert responsive anox4 and able to move all extremities now increasingly lethargic

## 2021-07-03 NOTE — DISCHARGE NOTE PROVIDER - PROVIDER TOKENS
PROVIDER:[TOKEN:[[id number]],ESTABLISHEDPATIENT:[T]],PROVIDER:[TOKEN:[[id number]],ESTABLISHEDPATIENT:[T]]
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
## 2021-07-04 LAB
COVID-19 SPIKE DOMAIN AB INTERP: POSITIVE
COVID-19 SPIKE DOMAIN ANTIBODY RESULT: 64.2 U/ML — HIGH
SARS-COV-2 IGG+IGM SERPL QL IA: 64.2 U/ML — HIGH
SARS-COV-2 IGG+IGM SERPL QL IA: POSITIVE

## 2021-12-08 ENCOUNTER — EMERGENCY (EMERGENCY)
Facility: HOSPITAL | Age: 31
LOS: 1 days | Discharge: HOME | End: 2021-12-08
Attending: HOSPITALIST | Admitting: HOSPITALIST
Payer: MEDICAID

## 2021-12-08 ENCOUNTER — EMERGENCY (EMERGENCY)
Facility: HOSPITAL | Age: 31
LOS: 0 days | Discharge: OTHER INSTITUTION | End: 2021-12-08
Attending: EMERGENCY MEDICINE
Payer: MEDICAID

## 2021-12-08 VITALS
OXYGEN SATURATION: 96 % | RESPIRATION RATE: 16 BRPM | TEMPERATURE: 99 F | SYSTOLIC BLOOD PRESSURE: 132 MMHG | DIASTOLIC BLOOD PRESSURE: 84 MMHG | HEART RATE: 78 BPM | HEIGHT: 73 IN

## 2021-12-08 VITALS — WEIGHT: 164.91 LBS | HEIGHT: 73 IN

## 2021-12-08 VITALS
DIASTOLIC BLOOD PRESSURE: 95 MMHG | RESPIRATION RATE: 16 BRPM | HEART RATE: 71 BPM | SYSTOLIC BLOOD PRESSURE: 142 MMHG | OXYGEN SATURATION: 100 % | TEMPERATURE: 98 F

## 2021-12-08 DIAGNOSIS — R51.9 HEADACHE, UNSPECIFIED: ICD-10-CM

## 2021-12-08 DIAGNOSIS — J02.9 ACUTE PHARYNGITIS, UNSPECIFIED: ICD-10-CM

## 2021-12-08 DIAGNOSIS — M79.10 MYALGIA, UNSPECIFIED SITE: ICD-10-CM

## 2021-12-08 DIAGNOSIS — J36 PERITONSILLAR ABSCESS: ICD-10-CM

## 2021-12-08 PROBLEM — Z72.89 OTHER PROBLEMS RELATED TO LIFESTYLE: Chronic | Status: ACTIVE | Noted: 2021-07-09

## 2021-12-08 LAB
ANION GAP SERPL CALC-SCNC: 6 MMOL/L — SIGNIFICANT CHANGE UP (ref 5–17)
BASOPHILS # BLD AUTO: 0 K/UL — SIGNIFICANT CHANGE UP (ref 0–0.2)
BASOPHILS NFR BLD AUTO: 0 % — SIGNIFICANT CHANGE UP (ref 0–2)
BUN SERPL-MCNC: 14 MG/DL — SIGNIFICANT CHANGE UP (ref 7–23)
CALCIUM SERPL-MCNC: 10.2 MG/DL — HIGH (ref 8.5–10.1)
CHLORIDE SERPL-SCNC: 102 MMOL/L — SIGNIFICANT CHANGE UP (ref 96–108)
CO2 SERPL-SCNC: 29 MMOL/L — SIGNIFICANT CHANGE UP (ref 22–31)
CREAT SERPL-MCNC: 1.05 MG/DL — SIGNIFICANT CHANGE UP (ref 0.5–1.3)
EOSINOPHIL # BLD AUTO: 0 K/UL — SIGNIFICANT CHANGE UP (ref 0–0.5)
EOSINOPHIL NFR BLD AUTO: 0 % — SIGNIFICANT CHANGE UP (ref 0–6)
GLUCOSE SERPL-MCNC: 101 MG/DL — HIGH (ref 70–99)
HCT VFR BLD CALC: 43.6 % — SIGNIFICANT CHANGE UP (ref 39–50)
HGB BLD-MCNC: 14.6 G/DL — SIGNIFICANT CHANGE UP (ref 13–17)
LYMPHOCYTES # BLD AUTO: 15 % — SIGNIFICANT CHANGE UP (ref 13–44)
LYMPHOCYTES # BLD AUTO: 3.41 K/UL — HIGH (ref 1–3.3)
MCHC RBC-ENTMCNC: 30.2 PG — SIGNIFICANT CHANGE UP (ref 27–34)
MCHC RBC-ENTMCNC: 33.5 GM/DL — SIGNIFICANT CHANGE UP (ref 32–36)
MCV RBC AUTO: 90.1 FL — SIGNIFICANT CHANGE UP (ref 80–100)
MONOCYTES # BLD AUTO: 1.59 K/UL — HIGH (ref 0–0.9)
MONOCYTES NFR BLD AUTO: 7 % — SIGNIFICANT CHANGE UP (ref 2–14)
NEUTROPHILS # BLD AUTO: 17.5 K/UL — HIGH (ref 1.8–7.4)
NEUTROPHILS NFR BLD AUTO: 77 % — SIGNIFICANT CHANGE UP (ref 43–77)
NRBC # BLD: SIGNIFICANT CHANGE UP /100 WBCS (ref 0–0)
PLATELET # BLD AUTO: 400 K/UL — SIGNIFICANT CHANGE UP (ref 150–400)
POTASSIUM SERPL-MCNC: 4.6 MMOL/L — SIGNIFICANT CHANGE UP (ref 3.5–5.3)
POTASSIUM SERPL-SCNC: 4.6 MMOL/L — SIGNIFICANT CHANGE UP (ref 3.5–5.3)
RBC # BLD: 4.84 M/UL — SIGNIFICANT CHANGE UP (ref 4.2–5.8)
RBC # FLD: 12.6 % — SIGNIFICANT CHANGE UP (ref 10.3–14.5)
S PYO AG SPEC QL IA: NEGATIVE — SIGNIFICANT CHANGE UP
SARS-COV-2 RNA SPEC QL NAA+PROBE: SIGNIFICANT CHANGE UP
SODIUM SERPL-SCNC: 137 MMOL/L — SIGNIFICANT CHANGE UP (ref 135–145)
WBC # BLD: 22.73 K/UL — HIGH (ref 3.8–10.5)
WBC # FLD AUTO: 22.73 K/UL — HIGH (ref 3.8–10.5)

## 2021-12-08 PROCEDURE — 99285 EMERGENCY DEPT VISIT HI MDM: CPT

## 2021-12-08 PROCEDURE — 70491 CT SOFT TISSUE NECK W/DYE: CPT | Mod: 26,MA

## 2021-12-08 PROCEDURE — 80048 BASIC METABOLIC PNL TOTAL CA: CPT

## 2021-12-08 PROCEDURE — U0003: CPT

## 2021-12-08 PROCEDURE — 96374 THER/PROPH/DIAG INJ IV PUSH: CPT

## 2021-12-08 PROCEDURE — 96375 TX/PRO/DX INJ NEW DRUG ADDON: CPT

## 2021-12-08 PROCEDURE — 70491 CT SOFT TISSUE NECK W/DYE: CPT | Mod: MA

## 2021-12-08 PROCEDURE — 87081 CULTURE SCREEN ONLY: CPT

## 2021-12-08 PROCEDURE — 87880 STREP A ASSAY W/OPTIC: CPT

## 2021-12-08 PROCEDURE — 85025 COMPLETE CBC W/AUTO DIFF WBC: CPT

## 2021-12-08 PROCEDURE — 99285 EMERGENCY DEPT VISIT HI MDM: CPT | Mod: 25

## 2021-12-08 PROCEDURE — 36415 COLL VENOUS BLD VENIPUNCTURE: CPT

## 2021-12-08 PROCEDURE — U0005: CPT

## 2021-12-08 RX ORDER — SODIUM CHLORIDE 9 MG/ML
1000 INJECTION INTRAMUSCULAR; INTRAVENOUS; SUBCUTANEOUS ONCE
Refills: 0 | Status: COMPLETED | OUTPATIENT
Start: 2021-12-08 | End: 2021-12-08

## 2021-12-08 RX ORDER — KETOROLAC TROMETHAMINE 30 MG/ML
15 SYRINGE (ML) INJECTION ONCE
Refills: 0 | Status: DISCONTINUED | OUTPATIENT
Start: 2021-12-08 | End: 2021-12-08

## 2021-12-08 RX ORDER — DEXAMETHASONE 0.5 MG/5ML
10 ELIXIR ORAL ONCE
Refills: 0 | Status: DISCONTINUED | OUTPATIENT
Start: 2021-12-08 | End: 2021-12-08

## 2021-12-08 RX ORDER — CEFTRIAXONE 500 MG/1
1000 INJECTION, POWDER, FOR SOLUTION INTRAMUSCULAR; INTRAVENOUS ONCE
Refills: 0 | Status: DISCONTINUED | OUTPATIENT
Start: 2021-12-08 | End: 2021-12-08

## 2021-12-08 RX ORDER — CEFTRIAXONE 500 MG/1
1000 INJECTION, POWDER, FOR SOLUTION INTRAMUSCULAR; INTRAVENOUS ONCE
Refills: 0 | Status: COMPLETED | OUTPATIENT
Start: 2021-12-08 | End: 2021-12-08

## 2021-12-08 RX ORDER — DEXAMETHASONE 0.5 MG/5ML
10 ELIXIR ORAL ONCE
Refills: 0 | Status: COMPLETED | OUTPATIENT
Start: 2021-12-08 | End: 2021-12-08

## 2021-12-08 RX ORDER — ACETAMINOPHEN 500 MG
1000 TABLET ORAL ONCE
Refills: 0 | Status: COMPLETED | OUTPATIENT
Start: 2021-12-08 | End: 2021-12-08

## 2021-12-08 RX ADMIN — CEFTRIAXONE 1000 MILLIGRAM(S): 500 INJECTION, POWDER, FOR SOLUTION INTRAMUSCULAR; INTRAVENOUS at 15:24

## 2021-12-08 RX ADMIN — Medication 102 MILLIGRAM(S): at 23:08

## 2021-12-08 RX ADMIN — Medication 15 MILLIGRAM(S): at 23:15

## 2021-12-08 RX ADMIN — SODIUM CHLORIDE 1000 MILLILITER(S): 9 INJECTION INTRAMUSCULAR; INTRAVENOUS; SUBCUTANEOUS at 15:24

## 2021-12-08 RX ADMIN — Medication 15 MILLIGRAM(S): at 23:00

## 2021-12-08 RX ADMIN — Medication 102 MILLIGRAM(S): at 16:04

## 2021-12-08 RX ADMIN — Medication 400 MILLIGRAM(S): at 15:24

## 2021-12-08 NOTE — ED PROVIDER NOTE - NSFOLLOWUPCLINICS_GEN_ALL_ED_FT
[facility] - ENT  Otolaryngology (ENT)  [address] [place] [address]  Phone: [phone]  Fax:

## 2021-12-08 NOTE — CONSULT NOTE ADULT - SUBJECTIVE AND OBJECTIVE BOX
HPI:  Patient is a 31y Male presenting as a transfer from [facility] for a R sided PTA.        Physical Exam  T(C): 36.7 (12-08-21 @ 22:37), Max: 37.2 (12-08-21 @ 20:30)  HR: 64 (12-08-21 @ 22:37) (64 - 78)  BP: 137/77 (12-08-21 @ 22:37) (132/84 - 142/95)  RR: 17 (12-08-21 @ 22:37) (16 - 18)  SpO2: 98% (12-08-21 @ 22:37) (96% - 100%)  General: NAD, A+Ox3  No respiratory distress, stridor, or stertor  Voice quality: normal  Face:  Symmetric without masses or lesions  OU: PERRL, EOMI  AD: Pinna wnl, EAC clear, TM intact, no effusion  AS: Pinna wnl, EAC clear, TM intact, no effusion  Nose: nasal cavity clear bilaterally, inferior turbinates normal, mucosa normal without crusting or bleeding  OC/OP: tongue normal, floor of mouth wnl, no masses or lesions, OP clear  Neck: soft/flat, no LAD  CNII-XII intact        Procedure:  Verbal consent for procedure obtained from patient.  Approximately 3cc of 1% lidocaine with 1:100,000 epinephrine injected into R/L peritonsillar space at area of greatest edema.   An 18g needle was used to aspirate ***cc of purulent fluid from R/L peritonsillar region, sent for culture.   An 11 blade was then used to make ~1.5cm incision in R/L peritonsillar region at area of greatest fluctuance. Approximately ***cc of purulent drainage returned following incision. The incision was thoroughly explored with clamp.   Hemostasis achieved after H2O2/water gargle.   Procedure uncomplicated, well tolerated.         Assessmnet and Plan"   31y year old Male     HPI:  Patient is a 31y Male presenting as a transfer from [facility] for a R sided PTA.  Pain to R neck and OP for 2 days. Was given augmentin over telehealth visit, only took 1 dose. No otalgia, SOB, difficulty turning or moving neck. +muffled voice, odynophagia. Has never had PTA before. Given decadron, toradol, and unasyn.      Physical Exam  T(C): 36.7 (12-08-21 @ 22:37), Max: 37.2 (12-08-21 @ 20:30)  HR: 64 (12-08-21 @ 22:37) (64 - 78)  BP: 137/77 (12-08-21 @ 22:37) (132/84 - 142/95)  RR: 17 (12-08-21 @ 22:37) (16 - 18)  SpO2: 98% (12-08-21 @ 22:37) (96% - 100%)  General: NAD, A+Ox3  No respiratory distress, stridor, or stertor  Voice quality: normal, slightly muffled  Face:  Symmetric without masses or lesions  OU: EOMI  Nose: nasal cavity clear bilaterally, inferior turbinates normal, mucosa normal without crusting or bleeding  OC/OP: tongue normal, floor of mouth wnl, no masses or lesions, OP with R soft palate swelling. protrusion of tonsil towards midline. tonsils are 2+ b/l.  Neck: soft/flat, no LAD, no tenderness  CNII-XII intact      Procedure:  Verbal consent for procedure obtained from patient. Hurricane spray sprayed into posterior OP.  Approximately 5cc of 1% lidocaine with 1:100,000 epinephrine injected into R peritonsillar space.   An 18g needle was used to aspirate 1cc of purulent fluid from R peritonsillar region, sent for culture.   An 11 blade was then used to make ~1.5cm incision in R/L peritonsillar region at area of greatest fluctuance. The incision was thoroughly explored with clamp.   Hemostasis achieved after water gargle.   Procedure uncomplicated, well tolerated.         Assessment and Plan:  31y year old Male with R PTA. only 1 cc of purulence able to be aspirated. likely early abscess as he has only been having issues for 2 days.  - send culture  - ok to [place] with augmentin x 10 days  - soft diet and gargle mouth after eating  - follow up with ENT if any lack of improvement, worsening, or repeat PTA

## 2021-12-08 NOTE — ED PROVIDER NOTE - PATIENT PORTAL LINK FT
You can access the FollowMyHealth Patient Portal offered by [facility] by registering at the following website: [email or web address]. By joining [employer]’s FollowMyHealth portal, you will also be able to view your health information using other applications (apps) compatible with our system.

## 2021-12-08 NOTE — ED PROVIDER NOTE - PROGRESS NOTE DETAILS
DO Carlos PGY-3: patient cleared for DC by ENT. voice improved, patient feels better. tolerating PO. Will DC with strict return precautions. has abx at home, told to finish course

## 2021-12-08 NOTE — ED STATDOCS - CONSTITUTIONAL, MLM
well appearing and in no apparent distress. normal... well appearing and in no apparent distress. muffled voice

## 2021-12-08 NOTE — ED STATDOCS - OBJECTIVE STATEMENT
31 year old male with PMHx of anxiety, depression, presents to the ED 31 year old male with PMHx of anxiety, depression, presents to the ED c/o sore throat, headache, body aches x 2 days. Pt feels very swollen in throat and making it hard to eat.  COVID vaccinated, Pfizer. No other injuries or complaints.

## 2021-12-08 NOTE — ED STATDOCS - ENMT, MLM
Nasal mucosa clear.  Mouth with normal mucosa  Throat has no vesicles, no oropharyngeal exudates and uvula is midline. Nasal mucosa clear.  right tonsil enlarged with exudate, with uvula deviation to left

## 2021-12-08 NOTE — ED ADULT NURSE NOTE - OBJECTIVE STATEMENT
31 year old male received to rm 10 AAOx4 ambulatory transferred from [facility] for ENT consult for peritonsillar abscess. Pt c/o throat pain x 4 days with difficulty swallowing, pt states "it hurts to swallow so I just spit out saliva". No respiratory distress noted. Respirations even and unlabored sating 98% on room air. Pt denies headache, dizziness, chest pain, shortness of breath, n/v/d, fever, chills. Pt presents with 20G PIV to left AC from [facility]. Skin intact. VS as noted. Pt awaiting ENT consult

## 2021-12-08 NOTE — ED ADULT NURSE NOTE - CHIEF COMPLAINT QUOTE
Pt arrives to ED as ENT transfer from [facility].  Pt c/o sore throat and difficulty swallowing since Saturday, diagnosed with a peritonsillar abscess.  Pt reports difficulty swallowing but denies difficulty breathing.

## 2021-12-08 NOTE — ED PROVIDER NOTE - CLINICAL SUMMARY MEDICAL DECISION MAKING FREE TEXT BOX
DO Carlos PGY-3: 30 y/o M presenting as transfer for ENT eval for PTA, protecting airway. ENT c/s will come see patient. Redose steroids, toradol for pain. Dispo pending ENT recs

## 2021-12-08 NOTE — ED ADULT TRIAGE NOTE - CHIEF COMPLAINT QUOTE
Pt arrives to ED as ENT transfer from [facility].  Pt c/o sore throat and difficulty swallowing since Saturday, diagnosed with a peritonsillar abscess.  Pt reports difficulty swallowing but denies difficulty breathing.

## 2021-12-08 NOTE — ED PROVIDER NOTE - PHYSICAL EXAMINATION
gen: well appearing  Mentation: AAO x 3  psych: mood appropriate  ENT: significant R tonsilar swelling, no stridor  Eyes: conjunctivae clear bilaterally  Cardio: RRR, no m/r/g  Resp: normal BS b/l  GI: s/nt/nd  Neuro: sensation and motor function intact, CN 2-12 intact  MSK: normal movement of all extremities  Lymph/Vasc: no LE edema

## 2021-12-08 NOTE — ED STATDOCS - PROGRESS NOTE DETAILS
[id number] right PTA on CT. Will arrange tx to [facility] for ENT. MD BARRERA Call placed to tx center. MD BARRERA d/w with ed accepting dr. urban SE,MD

## 2021-12-08 NOTE — ED PROVIDER NOTE - ATTENDING CONTRIBUTION TO CARE
31M with no PMHx transferred from  for right PTA. patient has been having hoarseness of voice and throat pain for the past few days. now having difficulty opening his mouth. can swallow liquids. currently on Augmentin. having pain when swallowing solids. CT at  reveals PTA, sent to [facility] for ENT I&D.      ***GEN - NAD; well appearing; A+O x3 ***HEAD - NC/AT ***EYES/NOSE - PERRL, EOMI, mucous membranes moist, no discharge ***THROAT: + right PTA with deviation of uvula to left. +hoarseness of voice.  ***PULMONARY - CTA b/l, symmetric breath sounds. ***CARDIAC -s1s2, RRR, no M,G,R  ***ABDOMEN - +BS, ND, NT, soft, no guarding, no rebound, no masses   ***BACK - no CVA tenderness, Normal  spine ***EXTREMITIES - symmetric pulses, 2+ dp, capillary refill < 2 seconds, no clubbing, no cyanosis, no edema ***SKIN - no rash or bruising   ***NEUROLOGIC - alert, CN 2-12 intact    MDM:31M with right PTA, on Augmentin. managing secretions. will pain ENT for I&D.

## 2021-12-08 NOTE — ED PROVIDER NOTE - NSFOLLOWUPINSTRUCTIONS_ED_ALL_ED_FT
Peritonsillar Abscess    A peritonsillar abscess is an infected area in your throat that is filled with pus. It forms behind your tonsils. This may be treated by:  •Draining the pus. Your doctor may do this with a syringe and a needle (needle aspiration) or by making a cut in the abscess.    •Using antibiotic medicine.        Follow these instructions at home:    Medicines     •Take over-the-counter and prescription medicines only as told by your doctor.    •If you were prescribed an antibiotic, take it as told by your doctor. Do not stop taking the antibiotic even if you start to feel better.        Eating and drinking      •Drink enough fluid to keep your pee (urine) pale yellow.    •While your throat is sore, try one of these:  •Only drinking liquids.    •Eating only soft foods, such as yogurt and ice cream.        General instructions     •Rest as much as you can. Get plenty of sleep.    •Return to your normal activities as told by your doctor. Ask your doctor what activities are safe for you.    •If your abscess was drained, gargle with a salt-water mixture 3–4 times a day or as needed.  • To make a salt-water mixture, completely dissolve ½–1 tsp of salt in 1 cup of warm water.     •Do not swallow this mixture.    • Do not use any products that have nicotine or tobacco in them. These include cigarettes and e-cigarettes. If you need help quitting, ask your doctor.    •Keep all follow-up visits as told by your doctor. This is important.        Contact a doctor if you have:    •More pain, swelling, redness, or pus in your throat.    •A headache.    •Low energy (lethargy).    •A general feeling of illness (malaise).    •A fever.    •Dizziness.    •Trouble swallowing.    •Trouble eating.    •Signs of body fluid loss (dehydration), such as:  •Feeling light-headed when you are standing.    •Peeing (urinating) less than usual.    •A fast heart rate.    •Dry mouth.          Get help right away if you:    •Have trouble talking.    •Have trouble breathing.    •Breathing is easier when you lean forward.    •Cough up blood.    •Throw up (vomit) blood.    •Have very bad throat pain and it does not get better with medicine.        Summary    •A peritonsillar abscess is an infected area in your throat that is filled with pus.    •You may be treated by having the abscess drained and by taking antibiotic medicine.    •Contact a doctor if you have trouble swallowing or eating.    •Get help right away if you cough up blood or see blood when you throw up (vomit).      This information is not intended to replace advice given to you by your health care provider. Make sure you discuss any questions you have with your health care provider.

## 2021-12-09 VITALS
RESPIRATION RATE: 16 BRPM | HEART RATE: 70 BPM | TEMPERATURE: 98 F | SYSTOLIC BLOOD PRESSURE: 143 MMHG | OXYGEN SATURATION: 100 % | DIASTOLIC BLOOD PRESSURE: 81 MMHG

## 2021-12-09 NOTE — ED ADULT NURSE REASSESSMENT NOTE - NS ED NURSE REASSESS COMMENT FT1
Pt received, A&OX3, transfer from another location for chago tonsilar abcess. Patients pain controlled at this time, able to eat soft clears currently. No resp distress noted or reported patient saturating well on RA. No orders pending at this time safety maintained

## 2021-12-26 ENCOUNTER — EMERGENCY (EMERGENCY)
Facility: HOSPITAL | Age: 31
LOS: 0 days | Discharge: OTHER INSTITUTION | End: 2021-12-26
Attending: EMERGENCY MEDICINE
Payer: MEDICAID

## 2021-12-26 ENCOUNTER — EMERGENCY (EMERGENCY)
Facility: HOSPITAL | Age: 31
LOS: 1 days | Discharge: HOME | End: 2021-12-26
Attending: STUDENT IN AN ORGANIZED HEALTH CARE EDUCATION/TRAINING PROGRAM | Admitting: STUDENT IN AN ORGANIZED HEALTH CARE EDUCATION/TRAINING PROGRAM
Payer: MEDICAID

## 2021-12-26 VITALS
DIASTOLIC BLOOD PRESSURE: 74 MMHG | RESPIRATION RATE: 96 BRPM | OXYGEN SATURATION: 96 % | HEIGHT: 73 IN | SYSTOLIC BLOOD PRESSURE: 123 MMHG | TEMPERATURE: 100 F | HEART RATE: 83 BPM

## 2021-12-26 VITALS
RESPIRATION RATE: 18 BRPM | OXYGEN SATURATION: 98 % | HEART RATE: 82 BPM | DIASTOLIC BLOOD PRESSURE: 83 MMHG | TEMPERATURE: 98 F | SYSTOLIC BLOOD PRESSURE: 133 MMHG

## 2021-12-26 VITALS — WEIGHT: 199.96 LBS | HEIGHT: 73 IN

## 2021-12-26 DIAGNOSIS — F10.10 ALCOHOL ABUSE, UNCOMPLICATED: ICD-10-CM

## 2021-12-26 DIAGNOSIS — R07.0 PAIN IN THROAT: ICD-10-CM

## 2021-12-26 DIAGNOSIS — R51.9 HEADACHE, UNSPECIFIED: ICD-10-CM

## 2021-12-26 DIAGNOSIS — J03.90 ACUTE TONSILLITIS, UNSPECIFIED: ICD-10-CM

## 2021-12-26 DIAGNOSIS — Z20.822 CONTACT WITH AND (SUSPECTED) EXPOSURE TO COVID-19: ICD-10-CM

## 2021-12-26 LAB
ALBUMIN SERPL ELPH-MCNC: 3.8 G/DL — SIGNIFICANT CHANGE UP (ref 3.3–5)
ALP SERPL-CCNC: 118 U/L — SIGNIFICANT CHANGE UP (ref 40–120)
ALT FLD-CCNC: 16 U/L — SIGNIFICANT CHANGE UP (ref 12–78)
ANION GAP SERPL CALC-SCNC: 5 MMOL/L — SIGNIFICANT CHANGE UP (ref 5–17)
APTT BLD: 33.7 SEC — SIGNIFICANT CHANGE UP (ref 27.5–35.5)
AST SERPL-CCNC: 12 U/L — LOW (ref 15–37)
BASOPHILS # BLD AUTO: 0.03 K/UL — SIGNIFICANT CHANGE UP (ref 0–0.2)
BASOPHILS NFR BLD AUTO: 0.2 % — SIGNIFICANT CHANGE UP (ref 0–2)
BILIRUB SERPL-MCNC: 0.7 MG/DL — SIGNIFICANT CHANGE UP (ref 0.2–1.2)
BUN SERPL-MCNC: 14 MG/DL — SIGNIFICANT CHANGE UP (ref 7–23)
CALCIUM SERPL-MCNC: 9.7 MG/DL — SIGNIFICANT CHANGE UP (ref 8.5–10.1)
CHLORIDE SERPL-SCNC: 99 MMOL/L — SIGNIFICANT CHANGE UP (ref 96–108)
CO2 SERPL-SCNC: 30 MMOL/L — SIGNIFICANT CHANGE UP (ref 22–31)
CREAT SERPL-MCNC: 1 MG/DL — SIGNIFICANT CHANGE UP (ref 0.5–1.3)
EOSINOPHIL # BLD AUTO: 0.02 K/UL — SIGNIFICANT CHANGE UP (ref 0–0.5)
EOSINOPHIL NFR BLD AUTO: 0.1 % — SIGNIFICANT CHANGE UP (ref 0–6)
GLUCOSE SERPL-MCNC: 99 MG/DL — SIGNIFICANT CHANGE UP (ref 70–99)
HCT VFR BLD CALC: 40.4 % — SIGNIFICANT CHANGE UP (ref 39–50)
HGB BLD-MCNC: 13.6 G/DL — SIGNIFICANT CHANGE UP (ref 13–17)
IMM GRANULOCYTES NFR BLD AUTO: 0.4 % — SIGNIFICANT CHANGE UP (ref 0–1.5)
INR BLD: 1.2 RATIO — HIGH (ref 0.88–1.16)
LYMPHOCYTES # BLD AUTO: 1.47 K/UL — SIGNIFICANT CHANGE UP (ref 1–3.3)
LYMPHOCYTES # BLD AUTO: 9 % — LOW (ref 13–44)
MCHC RBC-ENTMCNC: 30 PG — SIGNIFICANT CHANGE UP (ref 27–34)
MCHC RBC-ENTMCNC: 33.7 GM/DL — SIGNIFICANT CHANGE UP (ref 32–36)
MCV RBC AUTO: 89.2 FL — SIGNIFICANT CHANGE UP (ref 80–100)
MONOCYTES # BLD AUTO: 1.35 K/UL — HIGH (ref 0–0.9)
MONOCYTES NFR BLD AUTO: 8.2 % — SIGNIFICANT CHANGE UP (ref 2–14)
NEUTROPHILS # BLD AUTO: 13.47 K/UL — HIGH (ref 1.8–7.4)
NEUTROPHILS NFR BLD AUTO: 82.1 % — HIGH (ref 43–77)
PLATELET # BLD AUTO: 316 K/UL — SIGNIFICANT CHANGE UP (ref 150–400)
POTASSIUM SERPL-MCNC: 4 MMOL/L — SIGNIFICANT CHANGE UP (ref 3.5–5.3)
POTASSIUM SERPL-SCNC: 4 MMOL/L — SIGNIFICANT CHANGE UP (ref 3.5–5.3)
PROT SERPL-MCNC: 8.3 GM/DL — SIGNIFICANT CHANGE UP (ref 6–8.3)
PROTHROM AB SERPL-ACNC: 13.8 SEC — HIGH (ref 10.6–13.6)
RBC # BLD: 4.53 M/UL — SIGNIFICANT CHANGE UP (ref 4.2–5.8)
RBC # FLD: 13.1 % — SIGNIFICANT CHANGE UP (ref 10.3–14.5)
SARS-COV-2 RNA SPEC QL NAA+PROBE: SIGNIFICANT CHANGE UP
SODIUM SERPL-SCNC: 134 MMOL/L — LOW (ref 135–145)
WBC # BLD: 16.4 K/UL — HIGH (ref 3.8–10.5)
WBC # FLD AUTO: 16.4 K/UL — HIGH (ref 3.8–10.5)

## 2021-12-26 PROCEDURE — 86900 BLOOD TYPING SEROLOGIC ABO: CPT

## 2021-12-26 PROCEDURE — 87635 SARS-COV-2 COVID-19 AMP PRB: CPT

## 2021-12-26 PROCEDURE — 96374 THER/PROPH/DIAG INJ IV PUSH: CPT

## 2021-12-26 PROCEDURE — 99284 EMERGENCY DEPT VISIT MOD MDM: CPT | Mod: 25

## 2021-12-26 PROCEDURE — G1004: CPT

## 2021-12-26 PROCEDURE — 85730 THROMBOPLASTIN TIME PARTIAL: CPT

## 2021-12-26 PROCEDURE — 85610 PROTHROMBIN TIME: CPT

## 2021-12-26 PROCEDURE — 70491 CT SOFT TISSUE NECK W/DYE: CPT | Mod: MG

## 2021-12-26 PROCEDURE — 70491 CT SOFT TISSUE NECK W/DYE: CPT | Mod: 26,MG

## 2021-12-26 PROCEDURE — 87040 BLOOD CULTURE FOR BACTERIA: CPT

## 2021-12-26 PROCEDURE — 85025 COMPLETE CBC W/AUTO DIFF WBC: CPT

## 2021-12-26 PROCEDURE — 80053 COMPREHEN METABOLIC PANEL: CPT

## 2021-12-26 PROCEDURE — 96375 TX/PRO/DX INJ NEW DRUG ADDON: CPT

## 2021-12-26 PROCEDURE — 99284 EMERGENCY DEPT VISIT MOD MDM: CPT

## 2021-12-26 PROCEDURE — 36415 COLL VENOUS BLD VENIPUNCTURE: CPT

## 2021-12-26 PROCEDURE — 86850 RBC ANTIBODY SCREEN: CPT

## 2021-12-26 PROCEDURE — 86901 BLOOD TYPING SEROLOGIC RH(D): CPT

## 2021-12-26 PROCEDURE — 99285 EMERGENCY DEPT VISIT HI MDM: CPT

## 2021-12-26 RX ORDER — BENZOCAINE 10 %
1 GEL (GRAM) MUCOUS MEMBRANE ONCE
Refills: 0 | Status: DISCONTINUED | OUTPATIENT
Start: 2021-12-26 | End: 2021-12-26

## 2021-12-26 RX ORDER — TETRACAINE/BENZOCAINE/BUTAMBEN 2%-14%-2%
1 OINTMENT (GRAM) TOPICAL ONCE
Refills: 0 | Status: DISCONTINUED | OUTPATIENT
Start: 2021-12-26 | End: 2021-12-30

## 2021-12-26 RX ORDER — KETOROLAC TROMETHAMINE 30 MG/ML
15 SYRINGE (ML) INJECTION ONCE
Refills: 0 | Status: DISCONTINUED | OUTPATIENT
Start: 2021-12-26 | End: 2021-12-26

## 2021-12-26 RX ORDER — DEXAMETHASONE 0.5 MG/5ML
6 ELIXIR ORAL ONCE
Refills: 0 | Status: COMPLETED | OUTPATIENT
Start: 2021-12-26 | End: 2021-12-26

## 2021-12-26 RX ADMIN — Medication 15 MILLIGRAM(S): at 23:55

## 2021-12-26 RX ADMIN — Medication 100 MILLIGRAM(S): at 17:12

## 2021-12-26 RX ADMIN — Medication 6 MILLIGRAM(S): at 15:47

## 2021-12-26 NOTE — ED PROVIDER NOTE - CLINICAL SUMMARY MEDICAL DECISION MAKING FREE TEXT BOX
redomenstartion of PTA s/p drainage few weeks ago,  given steroids, abx at [facility], transferred for ent consult

## 2021-12-26 NOTE — ED PROVIDER NOTE - PROGRESS NOTE DETAILS
CORBIN CARTWRIGHT: pta drained  by ent, they said pt iss safe for discharge, id iscussed d/c vs cdu with atient, pt would like to be d/c, will d/c him with rx fpr [id number]

## 2021-12-26 NOTE — ED ADULT NURSE NOTE - CHIEF COMPLAINT QUOTE
c/o throat pain started 2 days ago, s/p throat abscess surgery 2 weeks ago at [facility], finished antibiotics, c/o associated headache, denies fever

## 2021-12-26 NOTE — ED ADULT TRIAGE NOTE - CHIEF COMPLAINT QUOTE
transfer from [facility]- ent consult    had pta drained 2 weeks ago.. sent home.  returned to [facility] for same complaints.  diff speaking but no sob.

## 2021-12-26 NOTE — ED ADULT NURSE NOTE - OBJECTIVE STATEMENT
Pt presents to ED for throat pain 2 days ago. Pt states that he had an abscess drained 2 weeks ago, finished his antibiotics course. Pt states that he started to feel better until he finished the antibiotics 2 days ago. Pt also c/o of a headache. Denies fever/chills, diarrhea, nausea/vomiting. Pt states at rest he has minimal pain but pain gets worse when he swallows.

## 2021-12-26 NOTE — ED PROVIDER NOTE - ATTENDING CONTRIBUTION TO CARE
I (Iqra) agree with above, I performed a history and physical. Counseled linette medical staff, physician assistant, and/or medical student on medical decision making as documented. Medical decisions and treatment interventions were made in real time during the patient encounter. Additionally and/or with the following exceptions: The patient presented to the ED as a transfer from [facility] for PTA, had this 2 weeks ago and was drained but now has reaccumulated, protecting airway, has significant edema to right posterior oropharnyx. As per chart review abscess was associated with some minimal epiglotitis. ENT consulted, who drained the abscess and scope shows patent airway. Signed out to oncoming attending pending discharge home vs cdu.

## 2021-12-26 NOTE — ED ADULT NURSE REASSESSMENT NOTE - SYMPTOMS
c.o. throat pain with swallowing. no drooling noted. transferred from [facility] for ENT. respirations even and unlabored. had similar abcess requiring dranage earlier this month.

## 2021-12-26 NOTE — ED STATDOCS - OBJECTIVE STATEMENT
32 y/o M with PMHx of ETOH abuse, anxiety, depression, and s/p R PTA incision and drainage 12/8/21 at [facility] with Brea Shepherd and Andres presents ambulatory to the ED from home c/o +R sided sore throat x2 days with associated +HA. Reports he completed PO abx from recent procedure and felt he had healed without issue. Reports feels similar to recent PTA. No fever. Received 2nd dose of Pfizer COVID vaccine on 10/26/21. NKDA. PCP: Dr. Syd Caballero.

## 2021-12-26 NOTE — ED ADULT TRIAGE NOTE - CHIEF COMPLAINT QUOTE
c/o throat pain started 2 days ago, s/p throat abscess surgery 2 weeks ago at [facility], finished antibiotics, c/o associated headache, denies fever

## 2021-12-26 NOTE — ED STATDOCS - TOBACCO USE
PMHx: none. IUTD. NKA. Episode where patient became very pale, sweaty, then had an episode where he fell and had body shaking with eyes rolling to back of head. Lasted approx 1 minute per mom and then after told mom he feels very tired. Unknown if ever smoked

## 2021-12-26 NOTE — ED PROVIDER NOTE - PATIENT PORTAL LINK FT
You can access the FollowMyHealth Patient Portal offered by [facility] by registering at the following website: [email or web address]. By joining [employer]’s FollowMyHealth portal, you will also be able to view your health information using other applications (apps) compatible with our system.

## 2021-12-26 NOTE — ED PROVIDER NOTE - OBJECTIVE STATEMENT
30 y/o M with PMHx of ETOH abuse, anxiety, depression, and s/p R PTA incision and drainage 12/8/21 at [facility] with Brea Shepherd and Andres transferred from [facility],  after he presented to the ED from home c/o +R sided sore throat x2 days with associated +HA. Reports he completed PO abx from recent procedure and felt he had healed without issue. Reports feels similar to recent PTA. No fever. Received 2nd dose of Pfizer COVID vaccine on 10/26/21. NKDA. PCP: Dr. Syd Caballero.  IN [place] wbc was 16, ct showed redomenstartion of PTA, given decadron, clindamycin and transferred to [facility]

## 2021-12-26 NOTE — ED PROVIDER NOTE - CPE EDP NEURO NORM
The patient is asked to make an attempt to improve diet and exercise patterns to aid in medical management of this problem.     normal...

## 2021-12-27 NOTE — CONSULT NOTE ADULT - SUBJECTIVE AND OBJECTIVE BOX
Controlled Substance Refill Request    Medication requested: ALPRAZolam (XANAX) 1 MG tablet  Last refill: 10/8/20  Last annual physical: NA  Health maintenance due :   Health Maintenance Due   Topic Date Due   • Pneumococcal Vaccine 0-64 (1 of 1 - PPSV23) 11/25/1980   • Cervical Cancer Screening HPV CO-Testing  11/25/2004   • Breast Cancer Screening  11/25/2019   • Influenza Vaccine (1) 09/01/2020     Last office visit: 10/5/20  Next office visit: NA  Substance agreement signed on 10/9/20 with Dr. Powell        
Please let patient know prescription has been sent. She is due for Pap smear, and two vaccines - pneumonia and Influenza.   She should make an appointment for nurse appointment to have vaccines administered in the next 2 weeks and with me for annual physical with pap with in 3 months.       PDMP reviewed; no aberrant behavior identified, prescription authorized.    Alprazolam  1MG / Tablet  Rx# [id number] Benzodiazepine Qty: 45  Days: 30  Refills: 0 Prescribed: 10/8/2020  Dispensed: 10/8/2020  Sold: 10/12/2020 [facility]  [facility] [id number]    1. Depression with anxiety  Continue same - ALPRAZolam (XANAX) 1 MG tablet; Take 0.5 tablets by mouth daily. May also take 1 tablet nightly as needed for Anxiety.  Dispense: 45 tablet; Refill: 0      
Writer left message with patient informing her of  prescription refill. She was also encouraged to schedule a nurse visit and an annual physical with Dr. Powell. The clinic's phone number was provided.   
  Reason for Consultation:  Requested by:    Patient is a 31y old  Male who presents with a chief complaint of   HPI:  Patient is a 31y Male presenting as a transfer from [facility] for a R sided PTA that recollected after initial drainage on 12/8. Finished dose of clindamycin about a week ago. No respiratory distress but does have trouble swallowing.     Birth History:  PAST MEDICAL & SURGICAL HISTORY:  Anxiety    Depression    Alcohol use    History of wisdom tooth extraction      FAMILY HISTORY:      MEDICATIONS  (STANDING):  tetracaine/benzocaine/butamben Spray 1 Spray(s) Topical Once    MEDICATIONS  (PRN):    Allergies    No Known Allergies    Intolerances        REVIEW OF SYSTEMS:    CONSTITUTIONAL: No fever, weight loss, or fatigue  EYES: No eye pain, visual disturbances, or discharge  ENMT:  No difficulty hearing, tinnitus, vertigo; No sinus or throat pain  NECK: No pain or stiffness  BREASTS: No pain, masses, or nipple discharge  RESPIRATORY: No cough, wheezing, chills or hemoptysis; No shortness of breath  CARDIOVASCULAR: No chest pain, palpitations, dizziness, or leg swelling  GASTROINTESTINAL: No abdominal or epigastric pain. No nausea, vomiting, or hematemesis; No diarrhea or constipation. No melena or hematochezia.  GENITOURINARY: No dysuria, frequency, hematuria, or incontinence  NEUROLOGICAL: No headaches, memory loss, loss of strength, numbness, or tremors  SKIN: No itching, burning, rashes, or lesions   LYMPH NODES: No enlarged glands  ENDOCRINE: No heat or cold intolerance; No hair loss  MUSCULOSKELETAL: No joint pain or swelling; No muscle, back, or extremity pain  PSYCHIATRIC: No depression, anxiety, mood swings, or difficulty sleeping                          13.6   16.40 )-----------( 316      ( 26 Dec 2021 15:32 )             40.4     12-26    134<L>  |  99  |  14  ----------------------------<  99  4.0   |  30  |  1.00    Ca    9.7      26 Dec 2021 15:32    TPro  8.3  /  Alb  3.8  /  TBili  0.7  /  DBili  x   /  AST  12<L>  /  ALT  16  /  AlkPhos  118  12-26    Vital Signs Last 24 Hrs  T(C): 37.6 (26 Dec 2021 20:29), Max: 37.6 (26 Dec 2021 20:29)  T(F): 99.6 (26 Dec 2021 20:29), Max: 99.6 (26 Dec 2021 20:29)  HR: 83 (26 Dec 2021 20:29) (82 - 84)  BP: 123/74 (26 Dec 2021 20:29) (123/74 - 134/83)  BP(mean): 97 (26 Dec 2021 14:41) (97 - 97)  RR: 96 (26 Dec 2021 20:29) (18 - 96)  SpO2: 96% (26 Dec 2021 20:29) (96% - 98%)      PHYSICAL EXAM:  Constitutional Normal: well nourished, well developed, non-dysmorphic, no acute distress    Psychiatric: age appropriate behavior, cooperative    Skin: no obvious skin lesions    Lymphatic: no cervical lymphadenopathy		    External Nose:  Normal, no structural deformities  		  Anterior Nasal Cavity:	Normal mucosa, no turbinate hypertrophy, straight septum  					  Oral Cavity:  Good dentition, tongue midline, no lesions or ulcerations    Tonsilar Size: 2+ bilaterally; right sided peritonsillar fullness with mild uvular deviation; limited mouth opening    Neck: right sided neck swelling and tenderness    Pulmonary: No Acute Distress.     Neurologic: awake and alert      Fiberoptic Laryngoscopy-  Adenoids nonhypertrophied, epiglottis sharp, vocal cords mobile bilaterally with no lesions; right arytenoid mildly edematous; airway widely patent    Procedure: PTA  Using a headlight for visualization, the patient was sprayed with hurricaine spray and then numbed with 3cc of 1% lidocaine w/epi. An 18 gauge needle was then used to aspirate purulent fluid from the right peritonsillar region. Cultures were sent. Using an 11 blade scalpel, an curvilinear incision was then made in the area of aspiration from lateral to medial. The incision was spread open with a clamp and hemostasis was obtained.

## 2021-12-27 NOTE — CONSULT NOTE ADULT - ASSESSMENT
30 yo M with recurrent PTA s/p drainage  - clindamycin 10days  - one dose of decadron prior to discharge  - medrol dose pack on d/c  - follow up in clinic next week  - discussed with attending Dr. Campos

## 2021-12-28 LAB
CULTURE RESULTS: SIGNIFICANT CHANGE UP
SPECIMEN SOURCE: SIGNIFICANT CHANGE UP

## 2021-12-29 ENCOUNTER — APPOINTMENT (OUTPATIENT)
Dept: OTOLARYNGOLOGY | Facility: CLINIC | Age: 31
End: 2021-12-29
Payer: MEDICAID

## 2021-12-29 VITALS
WEIGHT: 200 LBS | HEIGHT: 70 IN | TEMPERATURE: 97.7 F | DIASTOLIC BLOOD PRESSURE: 80 MMHG | SYSTOLIC BLOOD PRESSURE: 110 MMHG | OXYGEN SATURATION: 98 % | BODY MASS INDEX: 28.63 KG/M2 | HEART RATE: 74 BPM

## 2021-12-29 DIAGNOSIS — J34.3 HYPERTROPHY OF NASAL TURBINATES: ICD-10-CM

## 2021-12-29 DIAGNOSIS — J36 PERITONSILLAR ABSCESS: ICD-10-CM

## 2021-12-29 DIAGNOSIS — J34.2 DEVIATED NASAL SEPTUM: ICD-10-CM

## 2021-12-29 DIAGNOSIS — Z87.09 PERSONAL HISTORY OF OTHER DISEASES OF THE RESPIRATORY SYSTEM: ICD-10-CM

## 2021-12-29 DIAGNOSIS — M95.0 ACQUIRED DEFORMITY OF NOSE: ICD-10-CM

## 2021-12-29 PROCEDURE — 99204 OFFICE O/P NEW MOD 45 MIN: CPT

## 2021-12-29 RX ORDER — NON-ADHERENT BANDAGE 3"X4"
0.65 BANDAGE TOPICAL TWICE DAILY
Qty: 1 | Refills: 2 | Status: ACTIVE | COMMUNITY
Start: 2021-12-29 | End: 1900-01-01

## 2021-12-29 RX ORDER — FLUTICASONE PROPIONATE 50 UG/1
50 SPRAY, METERED NASAL TWICE DAILY
Qty: 1 | Refills: 2 | Status: ACTIVE | COMMUNITY
Start: 2021-12-29 | End: 1900-01-01

## 2021-12-29 NOTE — HISTORY OF PRESENT ILLNESS
[de-identified] : 31y Male presenting as a transfer from [facility] for a R sided PTA that recollected after initial drainage on 12/8. Finished dose of clindamycin about a week ago. No respiratory distress but does have trouble swallowing. He was seen in [facility] ER on 12/26, and had 3 cc of purulence drained. He is now on clindamycin and medrol dose ignacio. Here for follow up.\par he reports three weeks ago he had throat pain and went to [place] ER and transferred to [facility] ER. He had pus drainage from PTA. He was discharged with antibiotics which he completed. He was feeling better, but then last week was worse again. he was again transferred to the ER where the PTA was drained. He reports now feeling better. He had 3 cc drained. He is still taking the antibiotics. and steroids. He is feeling much better. \par \par He also reports nasal trauma as a child, and again over the summer. He reports complete nasal obstruction on right. He has never tried nasal sprays. He is interested in correction.  [None] : No risk factors have been identified.

## 2021-12-29 NOTE — REASON FOR VISIT
[Initial Evaluation] : an initial evaluation for [FreeTextEntry2] : 31 year old male s/p PTA drainage

## 2021-12-29 NOTE — PHYSICAL EXAM
[Midline] : trachea located in midline position [Normal] : no rashes [] : septum deviated to the right [de-identified] : severely deviated dorsum to right [de-identified] : septum completely occluding right side [de-identified] : healing PTA cavity

## 2021-12-29 NOTE — ASSESSMENT
[FreeTextEntry1] : 31 year old male with peritonsillar abscess s/p drainage x 2. He is now improved and will completed abx and steroids. \par \par He also has severe nasal septal deviation, dorsal deviation after nasal trauma. I advised him to start flonase and nss, however, I do think he needs surgical correction for improvement. \par \par He has [employer] [facility] comm plan insurance, which we will stop accepting as of January 1. I advised him to return if he is able to change insurances to one we accept.

## 2022-03-28 ENCOUNTER — TRANSCRIPTION ENCOUNTER (OUTPATIENT)
Age: 32
End: 2022-03-28

## 2022-04-28 ENCOUNTER — EMERGENCY (EMERGENCY)
Facility: HOSPITAL | Age: 32
LOS: 1 days | Discharge: HOME | End: 2022-04-28
Attending: EMERGENCY MEDICINE
Payer: MEDICAID

## 2022-04-28 VITALS
HEIGHT: 73 IN | DIASTOLIC BLOOD PRESSURE: 82 MMHG | SYSTOLIC BLOOD PRESSURE: 137 MMHG | TEMPERATURE: 98 F | WEIGHT: 205.03 LBS | OXYGEN SATURATION: 95 % | RESPIRATION RATE: 19 BRPM | HEART RATE: 80 BPM

## 2022-04-28 DIAGNOSIS — Z98.890 OTHER SPECIFIED POSTPROCEDURAL STATES: Chronic | ICD-10-CM

## 2022-04-28 DIAGNOSIS — J03.90 ACUTE TONSILLITIS, UNSPECIFIED: ICD-10-CM

## 2022-04-28 LAB
ALBUMIN SERPL ELPH-MCNC: 4.2 G/DL — SIGNIFICANT CHANGE UP (ref 3.3–5)
ALP SERPL-CCNC: 121 U/L — HIGH (ref 40–120)
ALT FLD-CCNC: 15 U/L — SIGNIFICANT CHANGE UP (ref 10–45)
ANION GAP SERPL CALC-SCNC: 16 MMOL/L — SIGNIFICANT CHANGE UP (ref 5–17)
AST SERPL-CCNC: 11 U/L — SIGNIFICANT CHANGE UP (ref 10–40)
BASOPHILS # BLD AUTO: 0 K/UL — SIGNIFICANT CHANGE UP (ref 0–0.2)
BASOPHILS NFR BLD AUTO: 0 % — SIGNIFICANT CHANGE UP (ref 0–2)
BILIRUB SERPL-MCNC: 0.3 MG/DL — SIGNIFICANT CHANGE UP (ref 0.2–1.2)
BUN SERPL-MCNC: 11 MG/DL — SIGNIFICANT CHANGE UP (ref 7–23)
CALCIUM SERPL-MCNC: 8.9 MG/DL — SIGNIFICANT CHANGE UP (ref 8.4–10.5)
CHLORIDE SERPL-SCNC: 101 MMOL/L — SIGNIFICANT CHANGE UP (ref 96–108)
CO2 SERPL-SCNC: 22 MMOL/L — SIGNIFICANT CHANGE UP (ref 22–31)
CREAT SERPL-MCNC: 0.85 MG/DL — SIGNIFICANT CHANGE UP (ref 0.5–1.3)
EGFR: 118 ML/MIN/1.73M2 — SIGNIFICANT CHANGE UP
EOSINOPHIL # BLD AUTO: 0 K/UL — SIGNIFICANT CHANGE UP (ref 0–0.5)
EOSINOPHIL NFR BLD AUTO: 0 % — SIGNIFICANT CHANGE UP (ref 0–6)
GLUCOSE SERPL-MCNC: 99 MG/DL — SIGNIFICANT CHANGE UP (ref 70–99)
HCT VFR BLD CALC: 37.6 % — LOW (ref 39–50)
HGB BLD-MCNC: 12.7 G/DL — LOW (ref 13–17)
LYMPHOCYTES # BLD AUTO: 1.65 K/UL — SIGNIFICANT CHANGE UP (ref 1–3.3)
LYMPHOCYTES # BLD AUTO: 13.9 % — SIGNIFICANT CHANGE UP (ref 13–44)
MANUAL SMEAR VERIFICATION: SIGNIFICANT CHANGE UP
MCHC RBC-ENTMCNC: 29.1 PG — SIGNIFICANT CHANGE UP (ref 27–34)
MCHC RBC-ENTMCNC: 33.8 GM/DL — SIGNIFICANT CHANGE UP (ref 32–36)
MCV RBC AUTO: 86.2 FL — SIGNIFICANT CHANGE UP (ref 80–100)
MONOCYTES # BLD AUTO: 1.03 K/UL — HIGH (ref 0–0.9)
MONOCYTES NFR BLD AUTO: 8.7 % — SIGNIFICANT CHANGE UP (ref 2–14)
NEUTROPHILS # BLD AUTO: 9.17 K/UL — HIGH (ref 1.8–7.4)
NEUTROPHILS NFR BLD AUTO: 76.5 % — SIGNIFICANT CHANGE UP (ref 43–77)
NEUTS BAND # BLD: 0.9 % — SIGNIFICANT CHANGE UP (ref 0–8)
PLAT MORPH BLD: NORMAL — SIGNIFICANT CHANGE UP
PLATELET # BLD AUTO: 330 K/UL — SIGNIFICANT CHANGE UP (ref 150–400)
POTASSIUM SERPL-MCNC: 3.4 MMOL/L — LOW (ref 3.5–5.3)
POTASSIUM SERPL-SCNC: 3.4 MMOL/L — LOW (ref 3.5–5.3)
PROT SERPL-MCNC: 7.5 G/DL — SIGNIFICANT CHANGE UP (ref 6–8.3)
RAPID RVP RESULT: SIGNIFICANT CHANGE UP
RBC # BLD: 4.36 M/UL — SIGNIFICANT CHANGE UP (ref 4.2–5.8)
RBC # FLD: 13.4 % — SIGNIFICANT CHANGE UP (ref 10.3–14.5)
RBC BLD AUTO: NORMAL — SIGNIFICANT CHANGE UP
SARS-COV-2 RNA SPEC QL NAA+PROBE: SIGNIFICANT CHANGE UP
SODIUM SERPL-SCNC: 139 MMOL/L — SIGNIFICANT CHANGE UP (ref 135–145)
WBC # BLD: 11.85 K/UL — HIGH (ref 3.8–10.5)
WBC # FLD AUTO: 11.85 K/UL — HIGH (ref 3.8–10.5)

## 2022-04-28 PROCEDURE — 70491 CT SOFT TISSUE NECK W/DYE: CPT | Mod: 26,MA

## 2022-04-28 PROCEDURE — 99220: CPT

## 2022-04-28 RX ORDER — SODIUM CHLORIDE 9 MG/ML
1000 INJECTION INTRAMUSCULAR; INTRAVENOUS; SUBCUTANEOUS ONCE
Refills: 0 | Status: COMPLETED | OUTPATIENT
Start: 2022-04-28 | End: 2022-04-28

## 2022-04-28 RX ORDER — BUPROPION HYDROCHLORIDE 150 MG/1
150 TABLET, EXTENDED RELEASE ORAL DAILY
Refills: 0 | Status: DISCONTINUED | OUTPATIENT
Start: 2022-04-28 | End: 2022-05-01

## 2022-04-28 RX ORDER — POTASSIUM CHLORIDE 20 MEQ
10 PACKET (EA) ORAL
Refills: 0 | Status: COMPLETED | OUTPATIENT
Start: 2022-04-28 | End: 2022-04-28

## 2022-04-28 RX ORDER — AMPICILLIN SODIUM AND SULBACTAM SODIUM 250; 125 MG/ML; MG/ML
3 INJECTION, POWDER, FOR SUSPENSION INTRAMUSCULAR; INTRAVENOUS ONCE
Refills: 0 | Status: COMPLETED | OUTPATIENT
Start: 2022-04-28 | End: 2022-04-28

## 2022-04-28 RX ORDER — KETOROLAC TROMETHAMINE 30 MG/ML
15 SYRINGE (ML) INJECTION EVERY 6 HOURS
Refills: 0 | Status: DISCONTINUED | OUTPATIENT
Start: 2022-04-28 | End: 2022-04-28

## 2022-04-28 RX ORDER — SODIUM CHLORIDE 9 MG/ML
1000 INJECTION INTRAMUSCULAR; INTRAVENOUS; SUBCUTANEOUS
Refills: 0 | Status: DISCONTINUED | OUTPATIENT
Start: 2022-04-28 | End: 2022-05-01

## 2022-04-28 RX ORDER — AMPICILLIN SODIUM AND SULBACTAM SODIUM 250; 125 MG/ML; MG/ML
3 INJECTION, POWDER, FOR SUSPENSION INTRAMUSCULAR; INTRAVENOUS EVERY 6 HOURS
Refills: 0 | Status: DISCONTINUED | OUTPATIENT
Start: 2022-04-28 | End: 2022-05-01

## 2022-04-28 RX ORDER — ACETAMINOPHEN 500 MG
1000 TABLET ORAL ONCE
Refills: 0 | Status: COMPLETED | OUTPATIENT
Start: 2022-04-28 | End: 2022-04-28

## 2022-04-28 RX ORDER — DEXAMETHASONE 0.5 MG/5ML
10 ELIXIR ORAL ONCE
Refills: 0 | Status: COMPLETED | OUTPATIENT
Start: 2022-04-28 | End: 2022-04-28

## 2022-04-28 RX ORDER — FLUOXETINE HCL 10 MG
80 CAPSULE ORAL DAILY
Refills: 0 | Status: DISCONTINUED | OUTPATIENT
Start: 2022-04-28 | End: 2022-05-01

## 2022-04-28 RX ORDER — DEXAMETHASONE 0.5 MG/5ML
10 ELIXIR ORAL EVERY 8 HOURS
Refills: 0 | Status: COMPLETED | OUTPATIENT
Start: 2022-04-28 | End: 2022-04-28

## 2022-04-28 RX ORDER — BENZOCAINE AND MENTHOL 5; 1 G/100ML; G/100ML
1 LIQUID ORAL ONCE
Refills: 0 | Status: COMPLETED | OUTPATIENT
Start: 2022-04-28 | End: 2022-04-28

## 2022-04-28 RX ADMIN — Medication 80 MILLIGRAM(S): at 11:49

## 2022-04-28 RX ADMIN — SODIUM CHLORIDE 125 MILLILITER(S): 9 INJECTION INTRAMUSCULAR; INTRAVENOUS; SUBCUTANEOUS at 11:53

## 2022-04-28 RX ADMIN — SODIUM CHLORIDE 1000 MILLILITER(S): 9 INJECTION INTRAMUSCULAR; INTRAVENOUS; SUBCUTANEOUS at 05:08

## 2022-04-28 RX ADMIN — Medication 10 MILLIEQUIVALENT(S): at 15:30

## 2022-04-28 RX ADMIN — Medication 1000 MILLIGRAM(S): at 06:21

## 2022-04-28 RX ADMIN — Medication 100 MILLIEQUIVALENT(S): at 11:57

## 2022-04-28 RX ADMIN — Medication 15 MILLIGRAM(S): at 19:00

## 2022-04-28 RX ADMIN — BENZOCAINE AND MENTHOL 1 LOZENGE: 5; 1 LIQUID ORAL at 11:50

## 2022-04-28 RX ADMIN — SODIUM CHLORIDE 125 MILLILITER(S): 9 INJECTION INTRAMUSCULAR; INTRAVENOUS; SUBCUTANEOUS at 21:38

## 2022-04-28 RX ADMIN — AMPICILLIN SODIUM AND SULBACTAM SODIUM 200 GRAM(S): 250; 125 INJECTION, POWDER, FOR SUSPENSION INTRAMUSCULAR; INTRAVENOUS at 06:07

## 2022-04-28 RX ADMIN — Medication 100 MILLIEQUIVALENT(S): at 14:30

## 2022-04-28 RX ADMIN — Medication 102 MILLIGRAM(S): at 14:08

## 2022-04-28 RX ADMIN — Medication 15 MILLIGRAM(S): at 18:44

## 2022-04-28 RX ADMIN — Medication 400 MILLIGRAM(S): at 05:08

## 2022-04-28 RX ADMIN — Medication 100 MILLIEQUIVALENT(S): at 16:58

## 2022-04-28 RX ADMIN — SODIUM CHLORIDE 1000 MILLILITER(S): 9 INJECTION INTRAMUSCULAR; INTRAVENOUS; SUBCUTANEOUS at 21:37

## 2022-04-28 RX ADMIN — Medication 102 MILLIGRAM(S): at 05:30

## 2022-04-28 RX ADMIN — Medication 10 MILLIGRAM(S): at 14:33

## 2022-04-28 RX ADMIN — BUPROPION HYDROCHLORIDE 150 MILLIGRAM(S): 150 TABLET, EXTENDED RELEASE ORAL at 12:02

## 2022-04-28 RX ADMIN — Medication 10 MILLIGRAM(S): at 22:34

## 2022-04-28 RX ADMIN — AMPICILLIN SODIUM AND SULBACTAM SODIUM 200 GRAM(S): 250; 125 INJECTION, POWDER, FOR SUSPENSION INTRAMUSCULAR; INTRAVENOUS at 12:02

## 2022-04-28 RX ADMIN — AMPICILLIN SODIUM AND SULBACTAM SODIUM 3 GRAM(S): 250; 125 INJECTION, POWDER, FOR SUSPENSION INTRAMUSCULAR; INTRAVENOUS at 12:32

## 2022-04-28 RX ADMIN — Medication 102 MILLIGRAM(S): at 22:04

## 2022-04-28 RX ADMIN — Medication 10 MILLIEQUIVALENT(S): at 12:45

## 2022-04-28 RX ADMIN — AMPICILLIN SODIUM AND SULBACTAM SODIUM 200 GRAM(S): 250; 125 INJECTION, POWDER, FOR SUSPENSION INTRAMUSCULAR; INTRAVENOUS at 18:32

## 2022-04-28 NOTE — ED PROVIDER NOTE - OBJECTIVE STATEMENT
31M, PMH anx/depp, PTA presents with sore throat, pain with swallowing and muffled voice x 3 days. No fevers. Patient took ibuprofen for pain with minimal relief. Patient has had 2 PTAs in past requiring draining here states it feels similar. No drooling. No cough. No shortness of breath.

## 2022-04-28 NOTE — ED ADULT NURSE NOTE - ISOLATION TYPE:
Pt to ED for eval of chest pain since last night, pt was told her spo2 was low while walking at work, on arrival to ed spo2 98%   None

## 2022-04-28 NOTE — ED CDU PROVIDER INITIAL DAY NOTE - MEDICAL DECISION MAKING DETAILS
Pt w hx of R sided PTA ~6 months ago here with recurrent R sided throat pain, odynophagia, dysphagia. on exam, pt is afebrile, vital signs stable, no resp distress, no airway compromise, visible R peritonsillar swelling concerning for PTA. pt received IV unasyn, decadron, fluids. CT showed no drainable fluid collection. ENT evaluated and recommend placing pt in CDU for IV abx, re-eval.

## 2022-04-28 NOTE — CONSULT NOTE ADULT - PROBLEM SELECTOR RECOMMENDATION 9
CDU for IV antibiotics   Decadron   Can be discharged with Augmentin and medrol dose pack  Case discussed with Dr Campos  Patient to follow up with ENT as an outpatient for possible tonsillectomy.

## 2022-04-28 NOTE — ED CDU PROVIDER INITIAL DAY NOTE - PHYSICAL EXAMINATION
GEN: Pt in NAD, A&O x3.  PSYCH: Affect appropriate.  EYES: Sclera white w/o injection.   ENT: Head NCAT. MMM. +b/l tonsillar hypertrophy R>L without uvular deviation, +tonsilar and posterior pharyngeal erythema without exudates, no vesicles. NO dysphonia, trismus, excessive salivation, evidence  of airway obstruction, pus on the floor of the mouth, protrusion of the submental area.  Neck supple FROM.  RESP: CTA b/l, no wheezes, rales, or rhonchi.   CARDIAC: RRR, clear distinct S1, S2, no appreciable murmurs.  ABD: Abdomen soft, non-tender. NO palpable HSM. No CVAT b/l.  VASC: 2+ radial and dorsalis pedis pulses b/l. No edema or calf tenderness.  SKIN: No rashes on the trunk.

## 2022-04-28 NOTE — ED PROVIDER NOTE - PROGRESS NOTE DETAILS
ENT at bedside, fiberoptically scoped pt. large right PTA visualized extending down towards vocal cord but not obstructing them. ent recc steroids + Abx in cdu plus reassessment. +/- drain depending on ent discussion with ent attending.

## 2022-04-28 NOTE — ED PROVIDER NOTE - ATTENDING CONTRIBUTION TO CARE
Pt w hx of R sided PTA ~6 months ago here with recurrent R sided throat pain, odynophagia, dysphagia. on exam, pt is afebrile, vital signs stable, no resp distress, no airway compromise, visible R peritonsillar swelling concerning for PTA. pt received IV unasyn, decadron, fluids. ENT evaluated and recommend a CT to assess extent of abscess and they will subsequently drain it. they recommend placing pt in CDU for IV abx, re-eval.

## 2022-04-28 NOTE — ED ADULT NURSE NOTE - OBJECTIVE STATEMENT
30 y/o male coming to the ER with c/o throat pain. A&Ox4. Ambulatory. PMH anxiety, depression, ADHD. Patient states he first began to develop a right peritonsillar abscess in november 2021. Patient was seen at [facility] and transferred to [facility] for a I&D of the abscess, patient was placed on antibiotics and dc home. Patient states several weeks later the abscess returned and he again was seen at [facility] transferred to [facility], I&D was performed and he was sent home on antibiotics. Patient reports that 2-3 days ago he began to notice pain and swelling in the right side of his neck and has since had pain with swallowing. SPO2 95% on room air. Speech is clear. Patient has obvious swelling to the right tonsil partially occluding view of the back of the throat. Tenderness to palpation over the right neck. Patient denies chest pain, fever, chills, n/v/d, urinary symptoms, abdominal pain. Safety measures maintained. Bed in the lowest position. Call bell within reach.  Provider at the bedside. No acute distress noted or further complaints at this time. 32 y/o male coming to the ER with c/o throat pain. A&Ox4. Ambulatory. PMH anxiety, depression, ADHD. Patient states he first began to develop a right peritonsillar abscess in november 2021. Patient was seen at [facility] and transferred to [facility] for a I&D of the abscess, patient was placed on antibiotics and dc home. Patient states several weeks later the abscess returned and he again was seen at [facility] transferred to [facility], I&D was performed and he was sent home on antibiotics. Patient reports that 2-3 days ago he began to notice pain and swelling in the right side of his neck and has since had pain with swallowing. SPO2 95% on room air. Slightly muffled speech. Patient has obvious swelling to the right tonsil partially occluding view of the back of the throat. Tenderness to palpation over the right neck. Patient endorses taking 600mg of motrin around 0330 with minimal relief. Patient denies chest pain, fever, chills, n/v/d, urinary symptoms, abdominal pain. Safety measures maintained. Bed in the lowest position. Call bell within reach.  Provider at the bedside. No acute distress noted or further complaints at this time.

## 2022-04-28 NOTE — ED CDU PROVIDER DISPOSITION NOTE - ATTENDING APP SHARED VISIT CONTRIBUTION OF CARE
Attending MD Sanford:   I personally have seen and examined this patient.  Physician assistant note reviewed and agree on plan of care and except where noted. Patient re-evaluated and doing well.  No acute issues at  this time.  Lab and radiology tests reviewed with patient and/or family.  Patient stable for discharge.

## 2022-04-28 NOTE — ED CDU PROVIDER DISPOSITION NOTE - PATIENT PORTAL LINK FT
You can access the FollowMyHealth Patient Portal offered by [facility] by registering at the following website: [email or web address]. By joining [facility]’s FollowMyHealth portal, you will also be able to view your health information using other applications (apps) compatible with our system.

## 2022-04-28 NOTE — ED CDU PROVIDER INITIAL DAY NOTE - NSICDXPASTSURGICALHX_GEN_ALL_CORE_FT
PAST SURGICAL HISTORY:  History of wisdom tooth extraction      PAST SURGICAL HISTORY:  History of wisdom tooth extraction     S/P ACL repair right

## 2022-04-28 NOTE — CONSULT NOTE ADULT - PROBLEM/RECOMMENDATION-1
09/03/19 0700   Pain Assessment   Pain Assessment 0-10   Pain Score 3   Pain Type Chronic pain   Pain Location Shoulder   Pain Orientation Bilateral   Pain Descriptors Aching; Sore   Pain Frequency Intermittent   Pain Onset Sudden   Clinical Progression Not changed   Effect of Pain on Daily Activities occurs with attempts to reach above shoulder height due to chronic dec ROM   Patient's Stated Pain Goal No pain   Hospital Pain Intervention(s) Rest   Response to Interventions tolerated session overall   Restrictions/Precautions   Precautions Bed/chair alarms;Cognitive; Fall Risk;Supervision on toilet/commode   Weight Bearing Restrictions No   ROM Restrictions No   Braces or Orthoses AFO  (BLEs)   QI: Sit to Stand   Assistance Needed Incidental touching   Assistance Provided by [place] No physical assistance   Comment A to stabilize surfaces   Sit to Stand CARE Score 4   QI: Chair/Bed-to-Chair Transfer   Assistance Needed Incidental touching   Assistance Provided by [place] No physical assistance   Comment completed at [facility] overall with cuing to position LEs and scoot forward in chair prior to standing  Chair/Bed-to-Chair Transfer CARE Score 4   Transfer Bed/Chair/Wheelchair   Limitations Noted In Endurance;LE Strength;UE Strength; Sequencing   Adaptive Equipment Roller Walker   Findings req one boost at end of session 2* fatigue   Bed, Chair, Wheelchair Transfer (FIM) 3 - [place] needs to lift, boost or assist to stand OR sit   Functional Standing Tolerance   Time 4 5min, 4min   Activity Pt completed nuts/bolts task in stance to focus on standing tolerance, unsupported standing balance, FMC  Pt tolerated ext stance but with minimal awareness of s/s of fatigue req cuing to take seated rest break     ROM- Right Upper Extremities   R Shoulder   (90* active, 100* passive before experiencing pain)   ROM - Left Upper Extremities    L Shoulder   (45* active, 80% passive)   Exercise Tools   Other Exercise Tool 1 tolerated towel glides + 3# weight in all horizontal planes supported on table top to promomote UE strength, endurance, and GH ROM for inc indep with functional ADL tasks and transfers  Pt reporting no pain wiht task   Other Exercise Tool 2 Completed pipe tree task with focus on UE functional ROM and functional cognition  Pt provided with dimensions of pipes, paper measuring tape, and blue print for pipe tree  Pt required to locate appropriately lengthed pipe and build  Pt with difficulty remembering to measure pipes and grabbed wrong pipe about 30% of time  Pt cont to have difficulty rasing RUE above 90* flexion at [facility] therefore req ext time to build pipe tree upwards   Cognition   Overall Cognitive Status Impaired   Arousal/Participation Alert; Cooperative   Attention Attends with cues to redirect   Orientation Level Oriented X4   Memory Decreased short term memory;Decreased recall of precautions   Following Commands Follows one step commands with increased time or repetition   Comments Required cuing to sequence transfers and complete all steps of task   Activity Tolerance   Activity Tolerance Patient tolerated treatment well   Assessment   Treatment Assessment Pt participated in skilled OT session with focus on functional transfers, anterior weight shifting, UE ROM, standing tolerance, UE strengthening, and functional cognition  Pt completed transfers at overall CGA this session with only one instances of A required to boost with fatigue  Pt cont to benefit from exercises focusing on anterior weight shifting and therefore engaged in forward reach to grab bean bags on chair placed in front of wc  Pt req ext time to bend at trunk but physically able to achieve with no reported pain  After completing repetitive forward flexion, pt with improvements in carryover during functional transfers  Pt cont to req cuing for awareness of s/s of fatigue while in stance as pt attempts to cont stance instead of taking rest breaks   Pt would benefit from cont therapy with focus on functional transfers, dynamic reach to feet for LB dressing, and begin family training for inc pt safety for dc home  Cont with POC  Prognosis Fair   Problem List Decreased strength;Decreased range of motion;Decreased endurance; Impaired balance;Decreased mobility; Decreased coordination;Decreased cognition;Decreased safety awareness;Decreased skin integrity;Orthopedic restrictions   Barriers to Discharge Decreased caregiver support   Plan   Treatment/Interventions ADL retraining;Functional transfer training; Therapeutic exercise; Endurance training;Equipment eval/education; Bed mobility   Progress Progressing toward goals   Recommendation   OT Discharge Recommendation 24 hour supervision/assist   OT Therapy Minutes   OT Time In 0700   OT Time Out 0830   OT Total Time (minutes) 90   OT Mode of treatment - Individual (minutes) 90   OT Mode of treatment - Concurrent (minutes) 0   OT Mode of treatment - Group (minutes) 0   OT Mode of treatment - Co-treat (minutes) 0   OT Mode of Teatment - Total time(minutes) 90 minutes   Therapy Time missed   Time missed?  No DISPLAY PLAN FREE TEXT

## 2022-04-28 NOTE — ED CDU PROVIDER DISPOSITION NOTE - CARE PROVIDER_API CALL
Stefany Colon)  Otolaryngology  [address] [place] [address], [address]  [place] [address]  Phone: [phone]  Fax: [phone]  Follow Up Time:

## 2022-04-28 NOTE — ED CDU PROVIDER INITIAL DAY NOTE - PROGRESS NOTE DETAILS
VSS, pt in NAD, pt feels well notes mild burning from potassium infusion, RN informed and adjust infusion rate or otherwise mitigate pain, pt reports improvement in sore throat and is tolerating PO clears. will continue to monitor. VSS, NAD, airway intact- conversational, pt pain controlled, will continue to monitor. d/w ENT PA who reports if pt is feeling better can DC home, no formal recommendations from ENT in a note yet, will continue to advance pt diet and monitor. PT VSS, in NAD. Will continue to advance diet. No formal ENT recommendations in note yet, ENT paged.

## 2022-04-28 NOTE — ED CDU PROVIDER DISPOSITION NOTE - NSFOLLOWUPINSTRUCTIONS_ED_ALL_ED_FT
Follow-up with your primary care provider within 2-3 days.   Follow-up with ENT in 1 week.    Take Augmentin as prescribed, finish the full course.  ???Steroids    Pain can be managed with Acetaminophen and/or ibuprofen over the counter as directed as needed for pain.    Continue to take all other medications as directed.    Return to the emergency room immediately if your symptoms worsen or persist, or if you have fever despite use of tylenol and ibuprofen, headache, severe vomiting, loss of consciousness (passing out), difficulty breathing, drooling, pus in the mouth, difficulty swallowing, change in voice, or if any other concerning or questionable symptoms. Follow-up with your primary care provider within 2-3 days.   Follow-up with ENT Dr. Colon/Dr. White within 1 week, call for appointment [phone].    Take Augmentin and medrol dose ignacio as prescribed.      Pain can be managed with Acetaminophen and/or ibuprofen over the counter as directed as needed for pain.    Continue to take all other medications as directed.    Return to the emergency room immediately if your symptoms worsen or persist, or if you have fever despite use of tylenol and ibuprofen, headache, severe vomiting, loss of consciousness (passing out), difficulty breathing, drooling, pus in the mouth, difficulty swallowing, change in voice, or if any other concerning or questionable symptoms.

## 2022-04-28 NOTE — ED CDU PROVIDER DISPOSITION NOTE - CLINICAL COURSE
31M, PMH anx/depp, PTA presents with sore throat, pain with swallowing and muffled voice x 3 days. No fevers. Patient took ibuprofen for pain with minimal relief. Patient has had 2 PTAs in past requiring draining here states it feels similar. No drooling. No cough. No shortness of breath.  ED Course: k 3.4, WBC 11.8, CT shows evidence of R sided palantine tonsillitis with early abscess vs phlegmon. ENT evaluated and recommended abx, steroids, and will reeval for possible I&D. Pt sent to CDU for abx, pain control, ENT reeval.  CDU Course: ____ 31M, PMH anx/depp, PTA presents with sore throat, pain with swallowing and muffled voice x 3 days. No fevers. Patient took ibuprofen for pain with minimal relief. Patient has had 2 PTAs in past requiring draining here states it feels similar. No drooling. No cough. No shortness of breath.  ED Course: k 3.4, WBC 11.8, CT shows evidence of R sided palantine tonsillitis with early abscess vs phlegmon. ENT evaluated and recommended abx, steroids, and will reeval for possible I&D. Pt sent to CDU for abx, pain control, ENT reeval. symptoms improved. pt was discharged home with outpt followup.

## 2022-04-28 NOTE — CONSULT NOTE ADULT - SUBJECTIVE AND OBJECTIVE BOX
CC: enlarged tonsils     HPI: 30 y/o male PMH anx/depp, PTA presents with sore throat, pain with swallowing and muffled voice x 3 days. No fevers. Patient took ibuprofen for pain with minimal relief. Patient has had 2 PTAs in past requiring draining here states it feels similar. No drooling. No cough. No shortness of breath.        PAST MEDICAL & SURGICAL HISTORY:  Anxiety    Depression    Alcohol use    History of wisdom tooth extraction    S/P ACL repair  right      Allergies    No Known Allergies    Intolerances      MEDICATIONS  (STANDING):  ampicillin/sulbactam  IVPB 3 Gram(s) IV Intermittent every 6 hours  buPROPion XL (24-Hour) 150 milliGRAM(s) Oral daily  dexAMETHasone  IVPB 10 milliGRAM(s) IV Intermittent every 8 hours  FLUoxetine 80 milliGRAM(s) Oral daily  sodium chloride 0.9%. 1000 milliLiter(s) (125 mL/Hr) IV Continuous <Continuous>    MEDICATIONS  (PRN):  ketorolac   Injectable 15 milliGRAM(s) IV Push every 6 hours PRN Moderate Pain (4 - 6)      Social History: nonsmoker     Family history: no pertinent family history     ROS:   ENT: all negative except as noted in HPI   CV: denies palpitations  Pulm: denies SOB, cough, hemoptysis  GI: denies change in apetite, indigestion, n/v  : denies pertinent urinary symptoms, urgency  Neuro: denies numbness/tingling, loss of sensation  Psych: denies anxiety  MS: denies muscle weakness, instability  Heme: denies easy bruising or bleeding  Endo: denies heat/cold intolerance, excessive sweating  Vascular: denies LE edema    Vital Signs Last 24 Hrs  T(C): 36.8 (28 Apr 2022 15:38), Max: 36.8 (28 Apr 2022 15:38)  T(F): 98.2 (28 Apr 2022 15:38), Max: 98.2 (28 Apr 2022 15:38)  HR: 72 (28 Apr 2022 15:38) (65 - 80)  BP: 122/74 (28 Apr 2022 15:38) (113/67 - 139/79)  BP(mean): 82 (28 Apr 2022 06:09) (82 - 82)  RR: 18 (28 Apr 2022 12:29) (16 - 20)  SpO2: 96% (28 Apr 2022 15:38) (95% - 100%)                          12.7   11.85 )-----------( 330      ( 28 Apr 2022 05:01 )             37.6    04-28    139  |  101  |  11  ----------------------------<  99  3.4<L>   |  22  |  0.85    Ca    8.9      28 Apr 2022 05:01    TPro  7.5  /  Alb  4.2  /  TBili  0.3  /  DBili  x   /  AST  11  /  ALT  15  /  AlkPhos  121<H>  04-28       PHYSICAL EXAM:  Gen: NAD  Skin: No rashes, bruises, or lesions  Head: Normocephalic, Atraumatic  Face: no edema, erythema, or fluctuance. Parotid glands soft without mass  Eyes: no scleral injection  Nose: Nares bilaterally patent, no discharge  Mouth: Enlarged bilateral tonsils R>L, no exudates. Erythema bilaterally   Neck: Flat, supple, no lymphadenopathy, trachea midline, no masses  Lymphatic: No lymphadenopathy  Resp: breathing easily, no stridor  CV: no peripheral edema/cyanosis  GI: nondistended   Peripheral vascular: no JVD or edema  Neuro: facial nerve intact, no facial droop        Fiberoptic Indirect laryngoscopy:  (Scope #2 used)    Reason for Laryngoscopy: upper airway evaluation     Patient was unable to cooperate with mirror.  Nasopharynx, oropharynx, and hypopharynx clear, no bleeding. Tongue base, posterior pharyngeal wall, vallecula, epiglottis, and subglottis appear normal. No erythema, edema, pooling of secretions, masses or lesions. Airway patent, no foreign body visualized. No glottic/supraglottic edema. True vocal cords, arytenoids, vestibular folds, ventricles, pyriform sinuses, and aryepiglottic folds appear normal bilaterally. Vocal cords mobile with good contact b/l. Enlarged right tonsil           IMAGING/ADDITIONAL STUDIES:   ACC: [id number] EXAM: CT NECK SOFT TISSUE IC    PROCEDURE DATE: 04/28/2022        INTERPRETATION: CLINICAL HISTORY: Evaluation of peritonsillar abscess.    COMPARISON: CT soft tissue neck 12/26/2021    TECHNIQUE: Axial postcontrast 3 mm cuts through the neck were acquired. Sagittal and coronal reformations also are submitted for review. A total of 90cc Omnipaque 350 was injected intravenously without complication.    FINDINGS:    Bilateral cervical lymphadenopathy. Similar-appearing edematous soft tissue thickening of the right posterior nasopharynx, lateral oropharynx, with asymmetric enlargement of the palatine tonsils. There is fluid attenuation in this region without peripheral rim enhancement, consistent with right peritonsillar phlegmon/early abscess.    The thyroid, submandibular, and parotid glands are unremarkable.    There is mild thickening of the epiglottis, and leftward mass effect on the vallecula/piriform sinuses. The vocal cords are symmetric in appearance.    The paranasal sinuses are clear. The partially imaged brain is unremarkable. The partially imaged lung apices are clear.    Right scleral banding. Bilateral lens replacement.    IMPRESSION: Right palatine tonsillitis.    --- End of Report ---

## 2022-04-28 NOTE — ED ADULT NURSE REASSESSMENT NOTE - NS ED NURSE REASSESS COMMENT FT1
Called pharmacy to send down the IVPB decadron.
Patient alert and oriented times 3. VSS. Afebrile. Swelling noted to inside of right cheek. Tolerating clears. No nausea or vomiting noted. Goals discussed with patient to maintain pain level less than or equal to 2 on pain scale 1-10. Positioned for self comfort. Safety precautions maintained. Will continue to reassess.
Report Received from VICKIE Velazco, Pt breathing unlabored, spontaneous, and symmetrical. Pt ambulated to bathroom. Updated on POC.
Received pt from VICKIE Allen , received pt alert and responsive, oriented x4, denies any respiratory distress, SOB, or difficulty breathing. Pt transferred to CDU for throat pain currently 2/10. Pt declining pain medication at this time states improvement in symptoms. Pending IV Unasyn and IV decadron per order, pt aware.  IV in place, patent and free of signs of infiltration, V/S stable, pt afebrile, Pt educated on unit and unit rules, instructed patient to notify RN of any needed assistance, Pt verbalizes understanding, Call bell placed within reach. Safety maintained. Will continue to monitor.

## 2022-04-28 NOTE — CONSULT NOTE ADULT - ASSESSMENT
PMH anx/depp, PTA presents with sore throat, pain with swallowing and muffled voice x 3 days. No fevers. Patient took ibuprofen for pain with minimal relief. Patient has had 2 PTAs in past requiring draining here states it feels similar. No drooling. No cough. No shortness of breath. Patient saturating at 94% in RA. Laryngoscopy done at bedside, airway patent. Seen with enlarged tonsils R>L.  CT shows right palatine tonsillitis. No collection. As pt has had reoccurrences will recommend CDU

## 2022-04-28 NOTE — ED PROVIDER NOTE - CLINICAL SUMMARY MEDICAL DECISION MAKING FREE TEXT BOX
31M presents to ED with R sided PTA. no fevers. vss. exam: airway patent. Not drooling. ramon secretions well. no resp distress. CTA pending to r/o deeper space infection requested by ENT consults. will start steroids and IVF tylenol.

## 2022-04-28 NOTE — ED PROVIDER NOTE - ENMT, MLM
Airway patent, Nasal mucosa clear. Mouth with normal mucosa. Uvula dev to left. Large right peritonsillar abscess. No dental disease. No exudates. Tolerating secretions

## 2022-04-28 NOTE — ED CDU PROVIDER INITIAL DAY NOTE - OBJECTIVE STATEMENT
31M, PMH anx/depp, PTA presents with sore throat, pain with swallowing and muffled voice x 3 days. No fevers. Patient took ibuprofen for pain with minimal relief. Patient has had 2 PTAs in past requiring draining here states it feels similar. No drooling. No cough. No shortness of breath.  ED Course: k 3.4, WBC 11.8, CT shows evidence of R sided palantine tonsillitis with early abscess vs phlegmon. ENT evaluated and recommended abx, steroids, and will reeval for possible I&D. Pt sent to CDU for abx, pain control, ENT reeval.

## 2022-04-29 VITALS
SYSTOLIC BLOOD PRESSURE: 142 MMHG | HEART RATE: 72 BPM | OXYGEN SATURATION: 97 % | DIASTOLIC BLOOD PRESSURE: 87 MMHG | TEMPERATURE: 98 F | RESPIRATION RATE: 17 BRPM

## 2022-04-29 LAB
ANION GAP SERPL CALC-SCNC: 11 MMOL/L — SIGNIFICANT CHANGE UP (ref 5–17)
BASOPHILS # BLD AUTO: 0.01 K/UL — SIGNIFICANT CHANGE UP (ref 0–0.2)
BASOPHILS NFR BLD AUTO: 0.1 % — SIGNIFICANT CHANGE UP (ref 0–2)
BUN SERPL-MCNC: 10 MG/DL — SIGNIFICANT CHANGE UP (ref 7–23)
CALCIUM SERPL-MCNC: 8.2 MG/DL — LOW (ref 8.4–10.5)
CHLORIDE SERPL-SCNC: 106 MMOL/L — SIGNIFICANT CHANGE UP (ref 96–108)
CO2 SERPL-SCNC: 21 MMOL/L — LOW (ref 22–31)
CREAT SERPL-MCNC: 0.64 MG/DL — SIGNIFICANT CHANGE UP (ref 0.5–1.3)
EGFR: 129 ML/MIN/1.73M2 — SIGNIFICANT CHANGE UP
EOSINOPHIL # BLD AUTO: 0 K/UL — SIGNIFICANT CHANGE UP (ref 0–0.5)
EOSINOPHIL NFR BLD AUTO: 0 % — SIGNIFICANT CHANGE UP (ref 0–6)
GLUCOSE SERPL-MCNC: 132 MG/DL — HIGH (ref 70–99)
HCT VFR BLD CALC: 30.2 % — LOW (ref 39–50)
HGB BLD-MCNC: 10.3 G/DL — LOW (ref 13–17)
IMM GRANULOCYTES NFR BLD AUTO: 0.5 % — SIGNIFICANT CHANGE UP (ref 0–1.5)
LYMPHOCYTES # BLD AUTO: 1.17 K/UL — SIGNIFICANT CHANGE UP (ref 1–3.3)
LYMPHOCYTES # BLD AUTO: 9.1 % — LOW (ref 13–44)
MCHC RBC-ENTMCNC: 29.8 PG — SIGNIFICANT CHANGE UP (ref 27–34)
MCHC RBC-ENTMCNC: 34.1 GM/DL — SIGNIFICANT CHANGE UP (ref 32–36)
MCV RBC AUTO: 87.3 FL — SIGNIFICANT CHANGE UP (ref 80–100)
MONOCYTES # BLD AUTO: 0.36 K/UL — SIGNIFICANT CHANGE UP (ref 0–0.9)
MONOCYTES NFR BLD AUTO: 2.8 % — SIGNIFICANT CHANGE UP (ref 2–14)
NEUTROPHILS # BLD AUTO: 11.24 K/UL — HIGH (ref 1.8–7.4)
NEUTROPHILS NFR BLD AUTO: 87.5 % — HIGH (ref 43–77)
NRBC # BLD: 0 /100 WBCS — SIGNIFICANT CHANGE UP (ref 0–0)
PLATELET # BLD AUTO: 284 K/UL — SIGNIFICANT CHANGE UP (ref 150–400)
POTASSIUM SERPL-MCNC: 3.7 MMOL/L — SIGNIFICANT CHANGE UP (ref 3.5–5.3)
POTASSIUM SERPL-SCNC: 3.7 MMOL/L — SIGNIFICANT CHANGE UP (ref 3.5–5.3)
RBC # BLD: 3.46 M/UL — LOW (ref 4.2–5.8)
RBC # FLD: 13.5 % — SIGNIFICANT CHANGE UP (ref 10.3–14.5)
SODIUM SERPL-SCNC: 138 MMOL/L — SIGNIFICANT CHANGE UP (ref 135–145)
WBC # BLD: 12.85 K/UL — HIGH (ref 3.8–10.5)
WBC # FLD AUTO: 12.85 K/UL — HIGH (ref 3.8–10.5)

## 2022-04-29 PROCEDURE — 99217: CPT | Mod: FS

## 2022-04-29 PROCEDURE — 70491 CT SOFT TISSUE NECK W/DYE: CPT | Mod: MA

## 2022-04-29 PROCEDURE — 96376 TX/PRO/DX INJ SAME DRUG ADON: CPT | Mod: XU

## 2022-04-29 PROCEDURE — 96368 THER/DIAG CONCURRENT INF: CPT | Mod: XU

## 2022-04-29 PROCEDURE — G0378: CPT

## 2022-04-29 PROCEDURE — 80053 COMPREHEN METABOLIC PANEL: CPT

## 2022-04-29 PROCEDURE — 96375 TX/PRO/DX INJ NEW DRUG ADDON: CPT | Mod: XU

## 2022-04-29 PROCEDURE — 96367 TX/PROPH/DG ADDL SEQ IV INF: CPT | Mod: XU

## 2022-04-29 PROCEDURE — 0225U NFCT DS DNA&RNA 21 SARSCOV2: CPT

## 2022-04-29 PROCEDURE — 96361 HYDRATE IV INFUSION ADD-ON: CPT | Mod: XU

## 2022-04-29 PROCEDURE — 85025 COMPLETE CBC W/AUTO DIFF WBC: CPT

## 2022-04-29 PROCEDURE — 80048 BASIC METABOLIC PNL TOTAL CA: CPT

## 2022-04-29 PROCEDURE — 99285 EMERGENCY DEPT VISIT HI MDM: CPT | Mod: 25

## 2022-04-29 PROCEDURE — 96365 THER/PROPH/DIAG IV INF INIT: CPT | Mod: XU

## 2022-04-29 PROCEDURE — 31575 DIAGNOSTIC LARYNGOSCOPY: CPT

## 2022-04-29 RX ADMIN — SODIUM CHLORIDE 1000 MILLILITER(S): 9 INJECTION INTRAMUSCULAR; INTRAVENOUS; SUBCUTANEOUS at 05:03

## 2022-04-29 RX ADMIN — AMPICILLIN SODIUM AND SULBACTAM SODIUM 200 GRAM(S): 250; 125 INJECTION, POWDER, FOR SUSPENSION INTRAMUSCULAR; INTRAVENOUS at 06:00

## 2022-04-29 RX ADMIN — AMPICILLIN SODIUM AND SULBACTAM SODIUM 200 GRAM(S): 250; 125 INJECTION, POWDER, FOR SUSPENSION INTRAMUSCULAR; INTRAVENOUS at 00:33

## 2022-04-29 RX ADMIN — AMPICILLIN SODIUM AND SULBACTAM SODIUM 3 GRAM(S): 250; 125 INJECTION, POWDER, FOR SUSPENSION INTRAMUSCULAR; INTRAVENOUS at 01:03

## 2022-04-29 RX ADMIN — SODIUM CHLORIDE 125 MILLILITER(S): 9 INJECTION INTRAMUSCULAR; INTRAVENOUS; SUBCUTANEOUS at 05:09

## 2022-04-29 NOTE — ED CDU PROVIDER SUBSEQUENT DAY NOTE - PROGRESS NOTE DETAILS
ENT at bedside, cleared for discharge home with Augmentin and steroid pack. - Megan Alonzo PA-C Pt comfortable. feeling better today. tolerating po intake. Vital signs stable. pt stable for d/c home on augmentin and medrol dose ignacio. Pt comfortable. feeling better today. tolerating po intake. Vital signs stable. pt stable for d/c home on augmentin and medrol dose ignacio. discussed with Dr. Sanford. -Ela Thornton PA-C

## 2022-04-29 NOTE — ED CDU PROVIDER SUBSEQUENT DAY NOTE - ATTENDING APP SHARED VISIT CONTRIBUTION OF CARE
Attending MD Sanford:   I personally have seen and examined this patient.  Physician assistant note reviewed and agree on plan of care and except where noted.  Please see my MDM for further details.

## 2022-04-29 NOTE — PROGRESS NOTE ADULT - ASSESSMENT
PMH anx/depp, PTA presents with sore throat, pain with swallowing and muffled voice x 3 days. No fevers. Patient took ibuprofen for pain with minimal relief. Patient has had 2 PTAs in past requiring draining here states it feels similar. No drooling. No cough. No shortness of breath. Patient saturating at 94% in RA. Laryngoscopy done at bedside, airway patent. Seen with enlarged tonsils R>L.  CT shows right palatine tonsillitis. No collection. Reports improvement in symptoms this morning, tolerating po.

## 2022-04-29 NOTE — PROGRESS NOTE ADULT - SUBJECTIVE AND OBJECTIVE BOX
ENT ISSUE/POD: enlarged tonsils     HPI: 32 y/o male PMH anx/depp, PTA presents with sore throat, pain with swallowing and muffled voice x 3 days. No fevers. Patient took ibuprofen for pain with minimal relief. Patient has had 2 PTAs in past requiring draining here states it feels similar. Reports feeling much better this AM, able to tolerate PO. No drainable collection on CT. No drooling. No cough. No shortness of breath.        PAST MEDICAL & SURGICAL HISTORY:  Anxiety    Depression    Alcohol use    History of wisdom tooth extraction    S/P ACL repair  right      Allergies    No Known Allergies    Intolerances      MEDICATIONS  (STANDING):  ampicillin/sulbactam  IVPB 3 Gram(s) IV Intermittent every 6 hours  buPROPion XL (24-Hour) 150 milliGRAM(s) Oral daily  FLUoxetine 80 milliGRAM(s) Oral daily  sodium chloride 0.9%. 1000 milliLiter(s) (125 mL/Hr) IV Continuous <Continuous>    MEDICATIONS  (PRN):  ketorolac   Injectable 15 milliGRAM(s) IV Push every 6 hours PRN Moderate Pain (4 - 6)      Social History: see consult    Family history: see consult    ROS:   ENT: all negative except as noted in HPI   Pulm: denies SOB, cough, hemoptysis  Neuro: denies numbness/tingling, loss of sensation  Endo: denies heat/cold intolerance, excessive sweating      Vital Signs Last 24 Hrs  T(C): 36.6 (29 Apr 2022 08:14), Max: 36.8 (28 Apr 2022 15:38)  T(F): 97.8 (29 Apr 2022 08:14), Max: 98.2 (28 Apr 2022 15:38)  HR: 72 (29 Apr 2022 08:14) (65 - 76)  BP: 142/87 (29 Apr 2022 08:14) (122/74 - 154/78)  BP(mean): --  RR: 17 (29 Apr 2022 08:14) (16 - 18)  SpO2: 97% (29 Apr 2022 08:14) (95% - 100%)                          10.3   12.85 )-----------( 284      ( 29 Apr 2022 04:16 )             30.2    04-29    138  |  106  |  10  ----------------------------<  132<H>  3.7   |  21<L>  |  0.64    Ca    8.2<L>      29 Apr 2022 04:16    TPro  7.5  /  Alb  4.2  /  TBili  0.3  /  DBili  x   /  AST  11  /  ALT  15  /  AlkPhos  121<H>  04-28       PHYSICAL EXAM:  Gen: NAD  Skin: No rashes, bruises, or lesions  Head: Normocephalic, Atraumatic  Face: no edema, erythema, or fluctuance. Parotid glands soft without mass  Eyes: no scleral injection  Nose: Nares bilaterally patent, no discharge  Mouth: Enlarged bilateral tonsils R>L, no exudates.   Neck: Flat, supple, no lymphadenopathy, trachea midline, no masses  Lymphatic: No lymphadenopathy  Resp: breathing easily, no stridor  CV: no peripheral edema/cyanosis  GI: nondistended   Peripheral vascular: no JVD or edema  Neuro: facial nerve intact, no facial droop

## 2022-04-29 NOTE — PROGRESS NOTE ADULT - PROBLEM SELECTOR PLAN 1
Can be discharged with Augmentin and medrol dose pack  Patient to follow up with ENT as an outpatient for possible tonsillectomy. Call [phone].

## 2022-04-29 NOTE — ED CDU PROVIDER SUBSEQUENT DAY NOTE - PHYSICAL EXAMINATION
GEN: Pt in NAD, A&O x3.  	PSYCH: Affect appropriate.  	EYES: Sclera white w/o injection.   	ENT: Head NCAT. MMM. +b/l tonsillar hypertrophy R>L, +tonsilar and posterior pharyngeal erythema without exudate. Neck supple FROM.  	RESP: CTA b/l, no wheezes, rales, or rhonchi.   	CARDIAC: RRR, clear distinct S1, S2, no appreciable murmurs.  	ABD: Abdomen soft, non-tender  	VASC: No edema or calf tenderness.  SKIN: No visible rashes

## 2022-04-29 NOTE — ED CDU PROVIDER SUBSEQUENT DAY NOTE - HISTORY
No interval changes since initial CDU provider note. Pt feels overall improved. NAD VSS. Patient completed three doses of Decadron. Will continue Unasyn in the setting of tonsillitis. ENT following. - CORBIN Alonzo

## 2022-04-29 NOTE — ED CDU PROVIDER SUBSEQUENT DAY NOTE - MEDICAL DECISION MAKING DETAILS
Attending MD Sanford: 30 yo male presented to ED with sore throat and odynophagia. CT shows evidence of R sided palantine tonsillitis with early abscess vs phlegmon. ENT evaluated and recommended abx, steroids. In CDU for IV abx and re-eval by ENT.  No acute issues at this time.

## 2022-04-29 NOTE — ED CDU PROVIDER SUBSEQUENT DAY NOTE - NS ED ROS FT
Constitutional: No fever or chills  Eyes: No visual changes, eye pain   HEENT:  +sore throat, +pain with swallowing  +muffled voice  CV: No chest pain or lower extremity edema  Resp: No SOB no cough  GI: No abd pain. No nausea or vomiting.   : No dysuria, hematuria.   MSK: No musculoskeletal pain  Skin: No rash  Psych: No complaints   Neuro: No headache. No numbness or tingling.   Endo: No DM

## 2022-06-30 ENCOUNTER — NON-APPOINTMENT (OUTPATIENT)
Age: 32
End: 2022-06-30

## 2022-07-01 ENCOUNTER — EMERGENCY (EMERGENCY)
Facility: HOSPITAL | Age: 32
LOS: 0 days | Discharge: HOME | End: 2022-07-01
Attending: HOSPITALIST
Payer: MEDICAID

## 2022-07-01 VITALS — HEIGHT: 73 IN | WEIGHT: 214.95 LBS

## 2022-07-01 VITALS
DIASTOLIC BLOOD PRESSURE: 80 MMHG | HEART RATE: 62 BPM | SYSTOLIC BLOOD PRESSURE: 128 MMHG | OXYGEN SATURATION: 99 % | RESPIRATION RATE: 18 BRPM | TEMPERATURE: 98 F

## 2022-07-01 DIAGNOSIS — R22.0 LOCALIZED SWELLING, MASS AND LUMP, HEAD: ICD-10-CM

## 2022-07-01 DIAGNOSIS — F10.99 ALCOHOL USE, UNSPECIFIED WITH UNSPECIFIED ALCOHOL-INDUCED DISORDER: ICD-10-CM

## 2022-07-01 DIAGNOSIS — J02.9 ACUTE PHARYNGITIS, UNSPECIFIED: ICD-10-CM

## 2022-07-01 DIAGNOSIS — F41.8 OTHER SPECIFIED ANXIETY DISORDERS: ICD-10-CM

## 2022-07-01 DIAGNOSIS — R51.9 HEADACHE, UNSPECIFIED: ICD-10-CM

## 2022-07-01 DIAGNOSIS — Z98.890 OTHER SPECIFIED POSTPROCEDURAL STATES: Chronic | ICD-10-CM

## 2022-07-01 PROCEDURE — 99284 EMERGENCY DEPT VISIT MOD MDM: CPT

## 2022-07-01 PROCEDURE — 96372 THER/PROPH/DIAG INJ SC/IM: CPT

## 2022-07-01 PROCEDURE — 99283 EMERGENCY DEPT VISIT LOW MDM: CPT | Mod: 25

## 2022-07-01 RX ORDER — DEXAMETHASONE 0.5 MG/5ML
6 ELIXIR ORAL ONCE
Refills: 0 | Status: COMPLETED | OUTPATIENT
Start: 2022-07-01 | End: 2022-07-01

## 2022-07-01 RX ADMIN — Medication 6 MILLIGRAM(S): at 21:28

## 2022-07-01 RX ADMIN — Medication 1 TABLET(S): at 21:28

## 2022-07-01 NOTE — ED STATDOCS - ENMT, MLM
Nasal mucosa clear.  Mouth with normal mucosa . +Swelling of R tonsil, submandibular swelling on the R, no exudates,  Throat has no vesicles, no oropharyngeal exudates and uvula is midline.

## 2022-07-01 NOTE — ED STATDOCS - OBJECTIVE STATEMENT
32 y/o male with a PMHx of anxiety, depression, EtOH use presents to the ED with submandibular swelling and pain with swallowing to the R side of his throat x1-2 days. Pt with recurrent PTA which required drainage in the past. Dong fever, neck stiffness or hoarseness of voice.

## 2022-07-01 NOTE — ED ADULT TRIAGE NOTE - CHIEF COMPLAINT QUOTE
c/o right sided throat abscess started yesterday, patient states he had 2 episodes of throat abscess this year, need IV antibiotics, denies difficulty breathing, O2 sat in triage 96% on RA, pulse 79

## 2022-07-01 NOTE — ED STATDOCS - PROGRESS NOTE DETAILS
30 yo male presents with throat pain since last night. Pt states he had episode before and had to be drained in the past. Denies fever, cough, trouble swallowing or tolerating liquids. Will d/c home with meds for pharyngitis and d/c home with ENT referral. -Arun Farmer PA-C

## 2022-07-01 NOTE — ED STATDOCS - NS_ ATTENDINGSCRIBEDETAILS _ED_A_ED_FT
Mamie Billings MD: The history, relevant review of systems, past medical and surgical history, medical decision making, and physical examination was documented by the scribe in my presence and I attest to the accuracy of the documentation.

## 2022-07-01 NOTE — ED STATDOCS - PATIENT PORTAL LINK FT
You can access the FollowMyHealth Patient Portal offered by [facility] by registering at the following website: [email or web address]. By joining [facility]’s FollowMyHealth portal, you will also be able to view your health information using other applications (apps) compatible with our system.

## 2022-09-08 NOTE — ED STATDOCS - PRO INTERPRETER NEED 2
Patient requesting a call to discuss having another MRI before her follow up appt. Please advise. English

## 2022-10-06 NOTE — ED PROVIDER NOTE - CADM POA CENTRAL LINE
Person Calling/Reporting Symptoms: visiting nurse  Onset: Now  Positive Symptoms: BP 86/62  Negative Symptoms: Denies dizziness, CP, SOB, fever  Pain Scale: NA  Pregnant or breast feeding: NA    Pertinent Patient History/Medications :   Patient is not on any blood pressure medications. Patient is asymptomatic.    Recommendations:    I advised an immediate office evaluation. Patient family will push fluids and if no improvement will call back for an office evaluation. Aware to go to ER for worsening symptoms.       Preferred Pharmacy Selected No    MD Advised: No  Triage Protocol Used: Yes    Reason for Disposition  • [1] Systolic BP < 90 AND [2] NOT dizzy, lightheaded or weak    Protocols used: BLOOD PRESSURE - LOW-A-      
Who is calling: counselor/therapist  Main symptom or problem: blood pressure 86/62  Onset of symptoms: right now  Recently seen for this problem by any provider: No  Pharmacy selected: Yes   needed: No    Best callback number: [phone]  
No

## 2022-11-03 ENCOUNTER — EMERGENCY (EMERGENCY)
Facility: HOSPITAL | Age: 32
LOS: 0 days | Discharge: OTHER INSTITUTION | End: 2022-11-03
Attending: EMERGENCY MEDICINE
Payer: MEDICAID

## 2022-11-03 VITALS
DIASTOLIC BLOOD PRESSURE: 83 MMHG | RESPIRATION RATE: 18 BRPM | SYSTOLIC BLOOD PRESSURE: 129 MMHG | TEMPERATURE: 98 F | OXYGEN SATURATION: 98 % | HEART RATE: 75 BPM

## 2022-11-03 VITALS — HEIGHT: 71 IN | WEIGHT: 214.95 LBS
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
## 2022-11-03 DIAGNOSIS — F33.1 MAJOR DEPRESSIVE DISORDER, RECURRENT, MODERATE: ICD-10-CM

## 2022-11-03 DIAGNOSIS — R00.1 BRADYCARDIA, UNSPECIFIED: ICD-10-CM

## 2022-11-03 DIAGNOSIS — Z20.822 CONTACT WITH AND (SUSPECTED) EXPOSURE TO COVID-19: ICD-10-CM

## 2022-11-03 DIAGNOSIS — T43.626A UNDERDOSING OF AMPHETAMINES, INITIAL ENCOUNTER: ICD-10-CM

## 2022-11-03 DIAGNOSIS — F41.8 OTHER SPECIFIED ANXIETY DISORDERS: ICD-10-CM

## 2022-11-03 DIAGNOSIS — Z98.890 OTHER SPECIFIED POSTPROCEDURAL STATES: Chronic | ICD-10-CM

## 2022-11-03 DIAGNOSIS — Z71.1 PERSON WITH FEARED HEALTH COMPLAINT IN WHOM NO DIAGNOSIS IS MADE: ICD-10-CM
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
## 2022-11-03 DIAGNOSIS — F32.A DEPRESSION, UNSPECIFIED: ICD-10-CM

## 2022-11-03 DIAGNOSIS — F10.99 ALCOHOL USE, UNSPECIFIED WITH UNSPECIFIED ALCOHOL-INDUCED DISORDER: ICD-10-CM

## 2022-11-03 DIAGNOSIS — R45.851 SUICIDAL IDEATIONS: ICD-10-CM

## 2022-11-03 DIAGNOSIS — T43.226A UNDERDOSING OF SELECTIVE SEROTONIN REUPTAKE INHIBITORS, INITIAL ENCOUNTER: ICD-10-CM

## 2022-11-03 LAB
ANION GAP SERPL CALC-SCNC: 4 MMOL/L — LOW (ref 5–17)
APAP SERPL-MCNC: <2 UG/ML — LOW (ref 10–30)
APPEARANCE UR: CLEAR — SIGNIFICANT CHANGE UP
BASOPHILS # BLD AUTO: 0.04 K/UL — SIGNIFICANT CHANGE UP (ref 0–0.2)
BASOPHILS NFR BLD AUTO: 0.6 % — SIGNIFICANT CHANGE UP (ref 0–2)
BILIRUB UR-MCNC: NEGATIVE — SIGNIFICANT CHANGE UP
BUN SERPL-MCNC: 13 MG/DL — SIGNIFICANT CHANGE UP (ref 7–23)
CALCIUM SERPL-MCNC: 8.7 MG/DL — SIGNIFICANT CHANGE UP (ref 8.5–10.1)
CHLORIDE SERPL-SCNC: 103 MMOL/L — SIGNIFICANT CHANGE UP (ref 96–108)
CO2 SERPL-SCNC: 29 MMOL/L — SIGNIFICANT CHANGE UP (ref 22–31)
COLOR SPEC: YELLOW — SIGNIFICANT CHANGE UP
CREAT SERPL-MCNC: 0.84 MG/DL — SIGNIFICANT CHANGE UP (ref 0.5–1.3)
DIFF PNL FLD: NEGATIVE — SIGNIFICANT CHANGE UP
EGFR: 118 ML/MIN/1.73M2 — SIGNIFICANT CHANGE UP
EOSINOPHIL # BLD AUTO: 0.14 K/UL — SIGNIFICANT CHANGE UP (ref 0–0.5)
EOSINOPHIL NFR BLD AUTO: 2 % — SIGNIFICANT CHANGE UP (ref 0–6)
ETHANOL SERPL-MCNC: <10 MG/DL — SIGNIFICANT CHANGE UP (ref 0–10)
GLUCOSE SERPL-MCNC: 90 MG/DL — SIGNIFICANT CHANGE UP (ref 70–99)
GLUCOSE UR QL: NEGATIVE — SIGNIFICANT CHANGE UP
HCT VFR BLD CALC: 38.6 % — LOW (ref 39–50)
HGB BLD-MCNC: 13.3 G/DL — SIGNIFICANT CHANGE UP (ref 13–17)
IMM GRANULOCYTES NFR BLD AUTO: 0.3 % — SIGNIFICANT CHANGE UP (ref 0–0.9)
KETONES UR-MCNC: NEGATIVE — SIGNIFICANT CHANGE UP
LEUKOCYTE ESTERASE UR-ACNC: NEGATIVE — SIGNIFICANT CHANGE UP
LYMPHOCYTES # BLD AUTO: 2.29 K/UL — SIGNIFICANT CHANGE UP (ref 1–3.3)
LYMPHOCYTES # BLD AUTO: 32.9 % — SIGNIFICANT CHANGE UP (ref 13–44)
MCHC RBC-ENTMCNC: 29.9 PG — SIGNIFICANT CHANGE UP (ref 27–34)
MCHC RBC-ENTMCNC: 34.5 GM/DL — SIGNIFICANT CHANGE UP (ref 32–36)
MCV RBC AUTO: 86.7 FL — SIGNIFICANT CHANGE UP (ref 80–100)
MONOCYTES # BLD AUTO: 0.42 K/UL — SIGNIFICANT CHANGE UP (ref 0–0.9)
MONOCYTES NFR BLD AUTO: 6 % — SIGNIFICANT CHANGE UP (ref 2–14)
NEUTROPHILS # BLD AUTO: 4.05 K/UL — SIGNIFICANT CHANGE UP (ref 1.8–7.4)
NEUTROPHILS NFR BLD AUTO: 58.2 % — SIGNIFICANT CHANGE UP (ref 43–77)
NITRITE UR-MCNC: NEGATIVE — SIGNIFICANT CHANGE UP
PCP SPEC-MCNC: SIGNIFICANT CHANGE UP
PH UR: 6.5 — SIGNIFICANT CHANGE UP (ref 5–8)
PLATELET # BLD AUTO: 321 K/UL — SIGNIFICANT CHANGE UP (ref 150–400)
POTASSIUM SERPL-MCNC: 4.1 MMOL/L — SIGNIFICANT CHANGE UP (ref 3.5–5.3)
POTASSIUM SERPL-SCNC: 4.1 MMOL/L — SIGNIFICANT CHANGE UP (ref 3.5–5.3)
PROT UR-MCNC: NEGATIVE — SIGNIFICANT CHANGE UP
RBC # BLD: 4.45 M/UL — SIGNIFICANT CHANGE UP (ref 4.2–5.8)
RBC # FLD: 13.2 % — SIGNIFICANT CHANGE UP (ref 10.3–14.5)
SALICYLATES SERPL-MCNC: <1.7 MG/DL — LOW (ref 2.8–20)
SARS-COV-2 RNA SPEC QL NAA+PROBE: SIGNIFICANT CHANGE UP
SODIUM SERPL-SCNC: 136 MMOL/L — SIGNIFICANT CHANGE UP (ref 135–145)
SP GR SPEC: 1.01 — SIGNIFICANT CHANGE UP (ref 1.01–1.02)
UROBILINOGEN FLD QL: NEGATIVE — SIGNIFICANT CHANGE UP
WBC # BLD: 6.96 K/UL — SIGNIFICANT CHANGE UP (ref 3.8–10.5)
WBC # FLD AUTO: 6.96 K/UL — SIGNIFICANT CHANGE UP (ref 3.8–10.5)

## 2022-11-03 PROCEDURE — 99284 EMERGENCY DEPT VISIT MOD MDM: CPT

## 2022-11-03 PROCEDURE — 85025 COMPLETE CBC W/AUTO DIFF WBC: CPT

## 2022-11-03 PROCEDURE — 36415 COLL VENOUS BLD VENIPUNCTURE: CPT

## 2022-11-03 PROCEDURE — 80307 DRUG TEST PRSMV CHEM ANLYZR: CPT

## 2022-11-03 PROCEDURE — 93010 ELECTROCARDIOGRAM REPORT: CPT

## 2022-11-03 PROCEDURE — 80048 BASIC METABOLIC PNL TOTAL CA: CPT

## 2022-11-03 PROCEDURE — 93005 ELECTROCARDIOGRAM TRACING: CPT

## 2022-11-03 PROCEDURE — 81003 URINALYSIS AUTO W/O SCOPE: CPT

## 2022-11-03 PROCEDURE — 99285 EMERGENCY DEPT VISIT HI MDM: CPT

## 2022-11-03 PROCEDURE — U0005: CPT

## 2022-11-03 PROCEDURE — U0003: CPT

## 2022-11-03 PROCEDURE — 90791 PSYCH DIAGNOSTIC EVALUATION: CPT

## 2022-11-03 RX ORDER — BUPROPION HYDROCHLORIDE 150 MG/1
200 TABLET, EXTENDED RELEASE ORAL DAILY
Refills: 0 | Status: COMPLETED | OUTPATIENT
Start: 2022-11-03 | End: 2022-11-03

## 2022-11-03 RX ORDER — LAMOTRIGINE 25 MG/1
150 TABLET, ORALLY DISINTEGRATING ORAL ONCE
Refills: 0 | Status: COMPLETED | OUTPATIENT
Start: 2022-11-03 | End: 2022-11-03

## 2022-11-03 RX ORDER — BUPROPION HYDROCHLORIDE 150 MG/1
200 TABLET, EXTENDED RELEASE ORAL DAILY
Refills: 0 | Status: DISCONTINUED | OUTPATIENT
Start: 2022-11-03 | End: 2022-11-03

## 2022-11-03 RX ORDER — FLUOXETINE HCL 10 MG
80 CAPSULE ORAL ONCE
Refills: 0 | Status: COMPLETED | OUTPATIENT
Start: 2022-11-03 | End: 2022-11-03

## 2022-11-03 RX ADMIN — Medication 80 MILLIGRAM(S): at 10:45

## 2022-11-03 RX ADMIN — BUPROPION HYDROCHLORIDE 200 MILLIGRAM(S): 150 TABLET, EXTENDED RELEASE ORAL at 14:30

## 2022-11-03 RX ADMIN — LAMOTRIGINE 150 MILLIGRAM(S): 25 TABLET, ORALLY DISINTEGRATING ORAL at 10:45

## 2022-11-03 NOTE — ED PROVIDER NOTE - OBJECTIVE STATEMENT
33 y/o male with PMHx of anxiety and depression presents to the ED for evaluation of SI and depression. Pt under the care of Psychiatrist Stacy Schoenbach. Pt has been admitted twice in the past for self harm, depression and SI. Pt is on Lamictal 150mg, Wellbutrin 200mg, Prozac 80mg and Vyvanse 70mg. Pt states he ran out of Prozac and Vyvanse about a week ago and reports worsening depression. Pt reports low mood, hopelessness and suicidal thoughts with no active intent or plan. Pt reports he has been drinking alcohol every day to cope. Denies A/V hallucinations. No delusions elicited.

## 2022-11-03 NOTE — ED PROVIDER NOTE - NS ED ROS FT
Constitutional: No fever or chills  Eyes: No visual changes  HEENT: No throat pain  CV: No chest pain  Resp: No SOB no cough  GI: No abd pain, nausea or vomiting  : No dysuria  MSK: No musculoskeletal pain  Skin: No rash  Neuro: No headache,   Psych: +active SI

## 2022-11-03 NOTE — ED BEHAVIORAL HEALTH ASSESSMENT NOTE - PATIENT'S CHIEF COMPLAINT
"I have been feeling really depressed for the past two weeks, I have been more withdrawn, I just don't have any motivation."

## 2022-11-03 NOTE — ED BEHAVIORAL HEALTH ASSESSMENT NOTE - RISK ASSESSMENT
LOW RISK     ACUTE RISK FACTORS: Depressive episode, non-compliance with medications    CHRONIC RISK FACTORS: depression and anxiety, history of in-patient hospitalizations     PROTECTIVE FACTORS: no suicidal ideation/intent/plan, future oriented, supportive family, engaged in safety planning. Low Acute Suicide Risk

## 2022-11-03 NOTE — ED ADULT TRIAGE NOTE - CHIEF COMPLAINT QUOTE
pt presents to ed for evaluation of suicidal thoughts and depressive symptoms. pt has hx of depression under the care of Psychiatrist Stacy Schoenbach. Pt has 2  previous psychiatric inpatient admissions for self harm, depression and SI in 2013([facility]) and 2018([facility]). Pt currently on Lamictal 150mg, Wellbutrin 200mg, Prozac 80mg, Vyvanse 70mg. Pt reports he ran out of Prozac and Vyvanse for about a week- reports worsening depressive symptoms. Pt reports low mood, hopelessness and suicidal thoughts with no active intent or plan. Pt reports he has been drinking alcohol every day to cope. Denies A/V hallucinations. No delusions elicited. 1-1 initiated for safety

## 2022-11-03 NOTE — ED BEHAVIORAL HEALTH ASSESSMENT NOTE - HPI (INCLUDE ILLNESS QUALITY, SEVERITY, DURATION, TIMING, CONTEXT, MODIFYING FACTORS, ASSOCIATED SIGNS AND SYMPTOMS)
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 PPHx of depression and anxiety, in treatment with [facility]ARL[facility] Luke, on Vyvanse, Wellbutrin, Lamictal, and Prozac. 2 prior in-patient hospitalizations, for depression and anxiety. Presents with passive suicidal ideation in the setting of worsening depressive symptoms. Psychiatry consulted for evaluation.    Patient presents with depresses affect, low mood, appears dysphoric and tearful at times. Reports increasing symptoms of depression, including persistent sad mood, hopelessness, helplessness, worthlessness, anhedonia, difficulty concentrating, fatigue and low motivation lasting for the past two weeks. Reports he has been sleeping 10+ hours a night, has not been engaging or socializing with friends which is unlike him. Has been stressed at work, last week he became so dysphoric he had to leave work mid morning and spent the rest of the day in bed and has not been consistently going to work or leaving the house. He reports decreased appetite, some days not eating just snacking. He reports he ran out of Vyvanse and Prozac 1 week ago because he wasn't focused and was "mismanaging it" he admits to taking additional doses of Vyvanse with the intent to give him energy and motivation to function. Endorses passive suicidal thoughts, has thought about a plan, but has no intent. Denies HI. Denies AVH. Denies symptoms of ivonne. Denies paranoia. No delusions elicited.

## 2022-11-03 NOTE — ED BEHAVIORAL HEALTH ASSESSMENT NOTE - SUMMARY
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 PPHx of depression and anxiety, in treatment with KARLIE Luke, on Vyvanse, Wellbutrin, Lamictal, and Prozac. 2 prior in-patient hospitalizations, for depression and anxiety. Presents with passive suicidal ideation in the setting of worsening depressive symptoms. Psychiatry consulted for evaluation.    Patient presents with passive suicidal ideation, amotivation, dysphoria most consistent with MDD. These symptoms represent a change from baseline from which the patient cannot be reasonably expected to improve with current level of care. The patient is requesting voluntary inpatient psychiatric hospitalization for safety and stabilization.

## 2022-11-03 NOTE — ED BEHAVIORAL HEALTH NOTE - BEHAVIORAL HEALTH NOTE
Patient accepted by [facility] - Dr. Lemus.  All Legals complete as well as transfer papers.  Melly Webb to inform ED MD about transfer and  to set up transport.  Patient will go to [facility] at [facility] via EMS.

## 2022-11-03 NOTE — ED PROVIDER NOTE - PHYSICAL EXAMINATION
Constitutional: NAD AAOx3  Eyes: PERRLA EOMI  Head: Normocephalic atraumatic  Mouth: MMM  Cardiac: regular rate   Resp: Lungs CTAB  GI: Abd s/nt/nd, no rebound or guarding.  Neuro: awake, alert, moving all extremities, cranial nerves 2-12 intact, sensation intact, no dysmetria.  Skin: No rashes  Psych: +Pt endorses active SI, no plan

## 2022-11-03 NOTE — ED ADULT NURSE NOTE - NSFALLRSKINDICATORS_ED_ALL_ED
CONTINUOUS RENAL REPLACEMENT THERAPY (CRRT)  Restart    THERAPY MODE DURATION UF RATE   SLED Continuous 300-350/hr     See flow sheet               no

## 2022-11-03 NOTE — ED ADULT NURSE NOTE - OBJECTIVE STATEMENT
Pt presents to ER c/o SI and depression. Pt reports hx of self harm, depression, SI. Pt reports recently he has low energy and is sleeping more. Pt reports he is taking his meds. Denies HI. AO x 3

## 2022-11-03 NOTE — ED BEHAVIORAL HEALTH ASSESSMENT NOTE - NSBHATTESTAPPBILLTIME_PSY_A_CORE
I attest my time as RACHEL is greater than 50% of the total combined time spent on qualifying patient care activities. I have reviewed and verified the documentation.

## 2022-11-03 NOTE — ED BEHAVIORAL HEALTH ASSESSMENT NOTE - DESCRIPTION
Vital Signs Last 24 Hrs  T(C): 36.9 (03 Nov 2022 08:32), Max: 36.9 (03 Nov 2022 08:32)  T(F): 98.5 (03 Nov 2022 08:32), Max: 98.5 (03 Nov 2022 08:32)  HR: 55 (03 Nov 2022 08:32) (55 - 55)  BP: 151/99 (03 Nov 2022 08:32) (151/99 - 151/99)  BP(mean): 111 (03 Nov 2022 08:32) (111 - 111)  RR: 15 (03 Nov 2022 08:32) (15 - 15)  SpO2: 97% (03 Nov 2022 08:32) (97% - 97%)    Parameters below as of 03 Nov 2022 08:32  Patient On (Oxygen Delivery Method): room air As per HPI See ED Attending / Provider note

## 2022-11-03 NOTE — ED BEHAVIORAL HEALTH NOTE - BEHAVIORAL HEALTH NOTE
Pt accepted to [facility]- [facility] Casas, accepting MD- Dr. Lemus. ED MD aware, Pt aware and agreeable.  Transportation pending.

## 2022-12-06 ENCOUNTER — EMERGENCY (EMERGENCY)
Facility: HOSPITAL | Age: 32
LOS: 0 days | Discharge: HOME | End: 2022-12-06
Attending: STUDENT IN AN ORGANIZED HEALTH CARE EDUCATION/TRAINING PROGRAM
Payer: MEDICAID

## 2022-12-06 VITALS
HEART RATE: 72 BPM | TEMPERATURE: 98 F | RESPIRATION RATE: 16 BRPM | SYSTOLIC BLOOD PRESSURE: 120 MMHG | OXYGEN SATURATION: 99 % | DIASTOLIC BLOOD PRESSURE: 74 MMHG

## 2022-12-06 VITALS
HEART RATE: 58 BPM | DIASTOLIC BLOOD PRESSURE: 87 MMHG | OXYGEN SATURATION: 97 % | SYSTOLIC BLOOD PRESSURE: 122 MMHG | TEMPERATURE: 98 F | RESPIRATION RATE: 17 BRPM

## 2022-12-06 DIAGNOSIS — F32.A DEPRESSION, UNSPECIFIED: ICD-10-CM

## 2022-12-06 DIAGNOSIS — Z98.890 OTHER SPECIFIED POSTPROCEDURAL STATES: Chronic | ICD-10-CM

## 2022-12-06 DIAGNOSIS — F41.8 OTHER SPECIFIED ANXIETY DISORDERS: ICD-10-CM

## 2022-12-06 DIAGNOSIS — F33.42 MAJOR DEPRESSIVE DISORDER, RECURRENT, IN FULL REMISSION: ICD-10-CM

## 2022-12-06 LAB
ADD ON TEST-SPECIMEN IN LAB: SIGNIFICANT CHANGE UP
ALBUMIN SERPL ELPH-MCNC: 3.8 G/DL — SIGNIFICANT CHANGE UP (ref 3.3–5)
ALP SERPL-CCNC: 85 U/L — SIGNIFICANT CHANGE UP (ref 40–120)
ALT FLD-CCNC: 56 U/L — SIGNIFICANT CHANGE UP (ref 12–78)
ANION GAP SERPL CALC-SCNC: 1 MMOL/L — LOW (ref 5–17)
APAP SERPL-MCNC: < 2 UG/ML (ref 10–30)
APPEARANCE UR: CLEAR — SIGNIFICANT CHANGE UP
AST SERPL-CCNC: 31 U/L — SIGNIFICANT CHANGE UP (ref 15–37)
BASOPHILS # BLD AUTO: 0.05 K/UL — SIGNIFICANT CHANGE UP (ref 0–0.2)
BASOPHILS NFR BLD AUTO: 0.5 % — SIGNIFICANT CHANGE UP (ref 0–2)
BILIRUB SERPL-MCNC: 0.3 MG/DL — SIGNIFICANT CHANGE UP (ref 0.2–1.2)
BILIRUB UR-MCNC: NEGATIVE — SIGNIFICANT CHANGE UP
BUN SERPL-MCNC: 15 MG/DL — SIGNIFICANT CHANGE UP (ref 7–23)
CALCIUM SERPL-MCNC: 9.2 MG/DL — SIGNIFICANT CHANGE UP (ref 8.5–10.1)
CHLORIDE SERPL-SCNC: 102 MMOL/L — SIGNIFICANT CHANGE UP (ref 96–108)
CO2 SERPL-SCNC: 31 MMOL/L — SIGNIFICANT CHANGE UP (ref 22–31)
COLOR SPEC: YELLOW — SIGNIFICANT CHANGE UP
CREAT SERPL-MCNC: 1.02 MG/DL — SIGNIFICANT CHANGE UP (ref 0.5–1.3)
DIFF PNL FLD: NEGATIVE — SIGNIFICANT CHANGE UP
EGFR: 100 ML/MIN/1.73M2 — SIGNIFICANT CHANGE UP
EOSINOPHIL # BLD AUTO: 0.26 K/UL — SIGNIFICANT CHANGE UP (ref 0–0.5)
EOSINOPHIL NFR BLD AUTO: 2.6 % — SIGNIFICANT CHANGE UP (ref 0–6)
ETHANOL SERPL-MCNC: <10 MG/DL — SIGNIFICANT CHANGE UP (ref 0–10)
GLUCOSE SERPL-MCNC: 96 MG/DL — SIGNIFICANT CHANGE UP (ref 70–99)
GLUCOSE UR QL: NEGATIVE — SIGNIFICANT CHANGE UP
HCT VFR BLD CALC: 43 % — SIGNIFICANT CHANGE UP (ref 39–50)
HGB BLD-MCNC: 14.7 G/DL — SIGNIFICANT CHANGE UP (ref 13–17)
IMM GRANULOCYTES NFR BLD AUTO: 0.2 % — SIGNIFICANT CHANGE UP (ref 0–0.9)
KETONES UR-MCNC: NEGATIVE — SIGNIFICANT CHANGE UP
LEUKOCYTE ESTERASE UR-ACNC: ABNORMAL
LITHIUM SERPL-MCNC: 0.9 MMOL/L — SIGNIFICANT CHANGE UP (ref 0.6–1.2)
LYMPHOCYTES # BLD AUTO: 2.1 K/UL — SIGNIFICANT CHANGE UP (ref 1–3.3)
LYMPHOCYTES # BLD AUTO: 21.4 % — SIGNIFICANT CHANGE UP (ref 13–44)
MCHC RBC-ENTMCNC: 29.8 PG — SIGNIFICANT CHANGE UP (ref 27–34)
MCHC RBC-ENTMCNC: 34.2 GM/DL — SIGNIFICANT CHANGE UP (ref 32–36)
MCV RBC AUTO: 87.2 FL — SIGNIFICANT CHANGE UP (ref 80–100)
MONOCYTES # BLD AUTO: 0.66 K/UL — SIGNIFICANT CHANGE UP (ref 0–0.9)
MONOCYTES NFR BLD AUTO: 6.7 % — SIGNIFICANT CHANGE UP (ref 2–14)
NEUTROPHILS # BLD AUTO: 6.74 K/UL — SIGNIFICANT CHANGE UP (ref 1.8–7.4)
NEUTROPHILS NFR BLD AUTO: 68.6 % — SIGNIFICANT CHANGE UP (ref 43–77)
NITRITE UR-MCNC: NEGATIVE — SIGNIFICANT CHANGE UP
PCP SPEC-MCNC: SIGNIFICANT CHANGE UP
PH UR: 6 — SIGNIFICANT CHANGE UP (ref 5–8)
PLATELET # BLD AUTO: 344 K/UL — SIGNIFICANT CHANGE UP (ref 150–400)
POTASSIUM SERPL-MCNC: 3.9 MMOL/L — SIGNIFICANT CHANGE UP (ref 3.5–5.3)
POTASSIUM SERPL-SCNC: 3.9 MMOL/L — SIGNIFICANT CHANGE UP (ref 3.5–5.3)
PROT SERPL-MCNC: 7.5 GM/DL — SIGNIFICANT CHANGE UP (ref 6–8.3)
PROT UR-MCNC: 15
RBC # BLD: 4.93 M/UL — SIGNIFICANT CHANGE UP (ref 4.2–5.8)
RBC # FLD: 13.3 % — SIGNIFICANT CHANGE UP (ref 10.3–14.5)
SALICYLATES SERPL-MCNC: <1.7 MG/DL (ref 2.8–20)
SODIUM SERPL-SCNC: 134 MMOL/L — LOW (ref 135–145)
SP GR SPEC: 1.01 — SIGNIFICANT CHANGE UP (ref 1.01–1.02)
TSH SERPL-MCNC: 2.6 UU/ML — SIGNIFICANT CHANGE UP (ref 0.34–4.82)
UROBILINOGEN FLD QL: NEGATIVE — SIGNIFICANT CHANGE UP
WBC # BLD: 9.83 K/UL — SIGNIFICANT CHANGE UP (ref 3.8–10.5)
WBC # FLD AUTO: 9.83 K/UL — SIGNIFICANT CHANGE UP (ref 3.8–10.5)

## 2022-12-06 PROCEDURE — 99284 EMERGENCY DEPT VISIT MOD MDM: CPT

## 2022-12-06 PROCEDURE — 80307 DRUG TEST PRSMV CHEM ANLYZR: CPT

## 2022-12-06 PROCEDURE — 84443 ASSAY THYROID STIM HORMONE: CPT

## 2022-12-06 PROCEDURE — 85025 COMPLETE CBC W/AUTO DIFF WBC: CPT

## 2022-12-06 PROCEDURE — 36415 COLL VENOUS BLD VENIPUNCTURE: CPT

## 2022-12-06 PROCEDURE — 99285 EMERGENCY DEPT VISIT HI MDM: CPT

## 2022-12-06 PROCEDURE — 81001 URINALYSIS AUTO W/SCOPE: CPT

## 2022-12-06 PROCEDURE — 90791 PSYCH DIAGNOSTIC EVALUATION: CPT

## 2022-12-06 PROCEDURE — 80178 ASSAY OF LITHIUM: CPT

## 2022-12-06 PROCEDURE — 80053 COMPREHEN METABOLIC PANEL: CPT

## 2022-12-06 NOTE — ED PROVIDER NOTE - PATIENT PORTAL LINK FT
You can access the FollowMyHealth Patient Portal offered by [facility] by registering at the following website: [email or web address]. By joining [facility]’s FollowMyHealth portal, you will also be able to view your health information using other applications (apps) compatible with our system.

## 2022-12-06 NOTE — ED PROVIDER NOTE - IV ALTEPLASE DOOR HIDDEN
How Severe Is This Condition?: mild
Additional History: Patient states he applied iodine to the lesion and the most of it fell off but now he has brown spot where the lesion was.
show

## 2022-12-06 NOTE — ED PROVIDER NOTE - PROGRESS NOTE DETAILS
Adrienne Berman for attending Dr. Izquierdo:  Consulted psych Patient seen by psychiatry and [facility].  Patient is being cleared for discharge at this time.  Is pending a call back from [facility] for placement but states that he can do that from home given there is no bed availability.  Will discharge at this time.  Return to ED precautions were discussed.  All questions were answered.  Will discharge.  Dennis Izquierdo MD.

## 2022-12-06 NOTE — ED PROVIDER NOTE - OBJECTIVE STATEMENT
31 y/o male w/ a PMHx of anxiety and depression presents to the ED c/o depression. Pt was seen at  ED last month and was transferred to [facility], received treatment at [facility] for approx 3 weeks and then went to rehab [facility] for 3-4 days. Pt reports he has been back for 1 week and feels like his mental health is declining. Pt states he thinks he needs his meds adjusted. Pt endorses passive SI. Denies HI or previous suicide attempt. Pt states he is compliant w/ his meds. Pt currently taking Lamictal 100mg BIG, Effexor 37.5mg, Lithium 1200mg, and Revia 50mg. Psych: Dr. Stacy Schoenback

## 2022-12-06 NOTE — SBIRT NOTE ADULT - NSSBIRTDRINKAM_GEN_A_CORE
pt kept repeating that substance use is not his main concern as he has not used alcohol or marijuana since November.

## 2022-12-06 NOTE — ED PROVIDER NOTE - NS ED ROS FT
Constitutional: negative for fevers/chills  HENT: no hearing problems, sore throat, nasal congestion  Eyes: no visual disturbances  Neck: no neck stiffness or neck pain  Cardiovascular: no chest pain, no palpitations, no edema  Respiratory: no cough, no shortness of breath  Musculoskeletal: no back pain, no pain of extremities  Gastrointestinal: no abdominal pain, nausea, vomiting  : no dysuria or hematuria or polyuria  Neuro: no headaches, no numbness or tingling, no dizziness  Skin: no rashes or wounds   Psych: +Depression +SI

## 2022-12-06 NOTE — ED ADULT NURSE NOTE - CHIEF COMPLAINT QUOTE
pt c/o depression and wants his medications adjusted. pt denies SI/HI or hallucinations in triage. pt was seen at [facility] in the beginning of november and was transferred to [facility]. pt received treatment at [facility] for appx 3 weeks and went to a rehab [facility] for appx 3-4 days. pt has been home now for nearly 1 week and feels his mental health is declining. pt requesting to go back to [facility] for medication adjustment. pt has been taking medications as prescribed: lamictal 100mg BID, effexor 37.5mg once daily, lithium 1200 mg (takes 4 tabs of the 300mg at bedtime), Revia 50mg daily.

## 2022-12-06 NOTE — ED PROVIDER NOTE - CLINICAL SUMMARY MEDICAL DECISION MAKING FREE TEXT BOX
Pt here for passive SI and desire to go back to [facility], has underlying depression which is likely causing his Sx at this time. Will eval for toxic/metabolic abnormality as well as consult psych. Pt w/ no active plan at this time.

## 2022-12-06 NOTE — ED BEHAVIORAL HEALTH ASSESSMENT NOTE - DESCRIPTION
See ED Attending / Provider note As per HPI Vital Signs Last 24 Hrs  T(C): 36.4 (06 Dec 2022 08:48), Max: 36.4 (06 Dec 2022 08:48)  T(F): 97.6 (06 Dec 2022 08:48), Max: 97.6 (06 Dec 2022 08:48)  HR: 58 (06 Dec 2022 08:48) (58 - 58)  BP: 122/87 (06 Dec 2022 08:48) (122/87 - 122/87)  BP(mean): 98 (06 Dec 2022 08:48) (98 - 98)  RR: 17 (06 Dec 2022 08:48) (17 - 17)  SpO2: 97% (06 Dec 2022 08:48) (97% - 97%)    Parameters below as of 06 Dec 2022 08:48  Patient On (Oxygen Delivery Method): room air

## 2022-12-06 NOTE — ED BEHAVIORAL HEALTH ASSESSMENT NOTE - RISK ASSESSMENT
LOW RISK     ACUTE RISK FACTORS: not linked with psychiatrist    CHRONIC RISK FACTORS: depression and anxiety, history of in-patient hospitalization     PROTECTIVE FACTORS: no suicidal ideation/intent/plan, future oriented, supportive family, engaged in safety planning.

## 2022-12-06 NOTE — ED BEHAVIORAL HEALTH ASSESSMENT NOTE - SUMMARY
32 year-old  male, with history of depression and anxiety, with prior in-patient hospitalization (3) last at [facility] 11/2022, with no prior suicidal ideation or suicide attempt, presenting self-referred seeking inpatient readmission to [facility] for more medication adjustments. Psychiatry consulted for evaluation.    Patient presenting with depression in remission. No suicidal ideation/intent/plan. Patient has no manic / psychotic symptoms. No delusions elicited. Patient has therapeutic relationships and social supports. Patient is future oriented. Patient engaged in safety planning. Patient symptoms not indicating imminent risk for harm to self; not warranting involuntary in-patient hospitalization.

## 2022-12-06 NOTE — ED BEHAVIORAL HEALTH ASSESSMENT NOTE - HPI (INCLUDE ILLNESS QUALITY, SEVERITY, DURATION, TIMING, CONTEXT, MODIFYING FACTORS, ASSOCIATED SIGNS AND SYMPTOMS)
32 year-old  male, with history of depression and anxiety, with prior in-patient hospitalization (3) last at [facility] 11/2022, with no prior suicidal ideation or suicide attempt, presenting self-referred seeking inpatient readmission to [facility] for more medication adjustments. Psychiatry consulted for evaluation.    Patient is calm and cooperative, pleasant; linear, organized. Reports good sleep and appetite. Reports low mood, denies hopelessness or suicidal ideation, reports he has chronic intermittent passive thoughts, no SA, NSSIB. Denies manic / psychotic symptoms. Patient has no presence of thought disorder No delusions elicited. Reports positive therapeutic relationships and strong social supports.    Patient reports wanting to go back to an inpatient setting to continue to have his medications adjusted despite being at [facility] for 1 month, was d/c on 11/25/22. Reports he then went to [facility], a 28 day rehab but left early because he didn't feel there was adequate mental health treatment there. Reports he is currently in treatment at [facility].

## 2022-12-06 NOTE — ED ADULT NURSE NOTE - OBJECTIVE STATEMENT
Pt arrives to ED complaining of worsening feelings of depression. pt states he lacks desire to complete daily activities. Known history of depression, anxiety. takes lithium daily. pt states "I think I need admission and changes in my medications." Denies [facility].

## 2022-12-06 NOTE — ED BEHAVIORAL HEALTH ASSESSMENT NOTE - VIOLENCE PROTECTIVE FACTORS:
Residential stability/Relationship stability/Employment stability/Engagement in treatment/Affective Stability

## 2022-12-06 NOTE — ED PROVIDER NOTE - NSFOLLOWUPINSTRUCTIONS_ED_ALL_ED_FT
Managing Depression, Adult      Depression is a mental health condition that affects your thoughts, feelings, and actions. Being diagnosed with depression can bring you relief if you did not know why you have felt or behaved a certain way. It could also leave you feeling overwhelmed with uncertainty about your future. Preparing yourself to manage your symptoms can help you feel more positive about your future.      How to manage lifestyle changes      Managing stress      Stress is your body's reaction to life changes and events, both good and bad. Stress can add to your feelings of depression. Learning to manage your stress can help lessen your feelings of depression.    Try some of the following approaches to reducing your stress (stress reduction techniques):  •Listen to music that you enjoy and that inspires you.      •Try using a meditation kristi or take a meditation class.      •Develop a practice that helps you connect with your spiritual self. Walk in nature, pray, or go to a place of Quaker.      •Do some deep breathing. To do this, inhale slowly through your nose. Pause at the top of your inhale for a few seconds and then exhale slowly, letting your muscles relax.      •Practice yoga to help relax and work your muscles.      Choose a stress reduction technique that suits your lifestyle and personality. These techniques take time and practice to develop. Set aside 5–15 minutes a day to do them. Therapists can offer training in these techniques. Other things you can do to manage stress include:  •Keeping a stress diary.      •Knowing your limits and saying no when you think something is too much.      •Paying attention to how you react to certain situations. You may not be able to control everything, but you can change your reaction.      •Adding humor to your life by watching funny films or TV shows.      •Making time for activities that you enjoy and that relax you.      Medicines     Medicines, such as antidepressants, are often a part of treatment for depression.  •Talk with your pharmacist or health care provider about all the medicines, supplements, and herbal products that you take, their possible side effects, and what medicines and other products are safe to take together.      •Make sure to report any side effects you may have to your health care provider.      Relationships    Your health care provider may suggest family therapy, couples therapy, or individual therapy as part of your treatment.      How to recognize changes    Everyone responds differently to treatment for depression. As you recover from depression, you may start to:  •Have more interest in doing activities.      •Feel less hopeless.      •Have more energy.      •Overeat less often, or have a better appetite.      •Have better mental focus.      It is important to recognize if your depression is not getting better or is getting worse. The symptoms you had in the beginning may return, such as:  •Tiredness (fatigue) or low energy.      •Eating too much or too little.      •Sleeping too much or too little.      •Feeling restless, agitated, or hopeless.      •Trouble focusing or making decisions.      •Unexplained physical complaints.      •Feeling irritable, angry, or aggressive.      If you or your family members notice these symptoms coming back, let your health care provider know right away.      Follow these instructions at home:      Activity      •Try to get some form of exercise each day, such as walking, biking, swimming, or lifting weights.      •Practice stress reduction techniques.      •Engage your mind by taking a class or doing some volunteer work.      Lifestyle     •Get the right amount and quality of sleep.      •Cut down on using caffeine, tobacco, alcohol, and other potentially harmful substances.      •Eat a healthy diet that includes plenty of vegetables, fruits, whole grains, low-fat dairy products, and lean protein. Do not eat a lot of foods that are high in solid fats, added sugars, or salt (sodium).      General instructions     •Take over-the-counter and prescription medicines only as told by your health care provider.      •Keep all follow-up visits as told by your health care provider. This is important.        Where to find support      Talking to others      Friends and family members can be sources of support and guidance. Talk to trusted friends or family members about your condition. Explain your symptoms to them, and let them know that you are working with a health care provider to treat your depression. Tell friends and family members how they also can be helpful.    Finances     •Find appropriate mental health providers that fit with your financial situation.      •Talk with your health care provider about options to get reduced prices on your medicines.        Where to find more information    You can find support in your area from:   •Anxiety and Depression Association of [place] (ADAA): www.adaa.org      •Mental Health [place]: www.mentalhealth[place].net      •National [place] on Mental Illness: www.jaleesa.org        Contact a health care provider if:  •You stop taking your antidepressant medicines, and you have any of these symptoms:  •Nausea.      •Headache.      •Light-headedness.      •Chills and body aches.      •Not being able to sleep (insomnia).        •You or your friends and family think your depression is getting worse.        Get help right away if:    •You have thoughts of hurting yourself or others.      If you ever feel like you may hurt yourself or others, or have thoughts about taking your own life, get help right away. Go to your nearest emergency department or:    • Call your local emergency services (285 in the [place]       • Call a suicide crisis helpline, such as the National Suicide Prevention Lifeline at 1-238.836.7670 or 190 in the [place] This is open 24 hours a day in the [place]       • Text the Crisis Text Line at 653409 (in the [place]         Summary    •If you are diagnosed with depression, preparing yourself to manage your symptoms is a good way to feel positive about your future.      •Work with your health care provider on a management plan that includes stress reduction techniques, medicines (if applicable), therapy, and healthy lifestyle habits.      •Keep talking with your health care provider about how your treatment is working.      • If you have thoughts about taking your own life, call a suicide crisis helpline or text a crisis text line.       This information is not intended to replace advice given to you by your health care provider. Make sure you discuss any questions you have with your health care provider.

## 2022-12-06 NOTE — SBIRT NOTE ADULT - NSSBIRTDRGBRIEFINTDET_GEN_A_CORE
Provided SBIRT services: Full screen positive. Brief Intervention Performed. Screening results were reviewed with the patient and patient was provided information about healthy guidelines and potential negative consequences associated with level of risk. Motivation and readiness to reduce or stop use was discussed and goals and activities to make changes were suggested/offered. Patient reports that he hasn't used marijuana since November.

## 2022-12-06 NOTE — ED BEHAVIORAL HEALTH ASSESSMENT NOTE - DETAILS
self-referred [facility] 11/2022 Patient instructed to return to the ED or call 911 if patient experiences SI, HI, hopelessness, worsening of symptoms or has any other concerns. unable to reach

## 2022-12-06 NOTE — SBIRT NOTE ADULT - NSSBIRTBRIEFINTDET_GEN_A_CORE
Provided SBIRT services: Full screen positive. Brief Intervention Performed. Screening results were reviewed with the patient and patient was provided information about healthy guidelines and potential negative consequences associated with level of risk. Motivation and readiness to reduce or stop use was discussed and goals and activities to make changes were suggested/offered. Marcin at [facility] is looking patient up to see if re-admission would be possible. HC will speak to  about faxing clinicals.  Call placed to [facility] program for outpatient  care- voicemail left.   Pt to be emailed information for SBIRT, [facility], and [facility] for follow up. Provided email as <[email or web address]>; home number provided as <[phone]> Provided SBIRT services: Full screen positive. Brief Intervention Performed. Screening results were reviewed with the patient and patient was provided information about healthy guidelines and potential negative consequences associated with level of risk. Motivation and readiness to reduce or stop use was discussed and goals and activities to make changes were suggested/offered. Marcin at [facility] is looking patient up to see if re-admission would be possible, as patient was there last month. No bed availability for today. HC will speak to  about faxing clinicals.  Call placed to [facility] program for outpatient  care- voicemail left.   Pt to be emailed information for SBIRT, [facility], and [facility] for follow up. Provided email as <[email or web address]>; home number provided as <[phone]>

## 2022-12-06 NOTE — ED BEHAVIORAL HEALTH ASSESSMENT NOTE - NSBHATTESTATTENDNAMEFT_PSY_A_CORE
[facility] to see how long pt has been on current dose of lithium as it has changed since last seen.  Writer told Raisa she would need to call prescribing provider - Dr. Lewis regarding this.  
Dr. Dyer

## 2022-12-06 NOTE — ED PROVIDER NOTE - PHYSICAL EXAMINATION
Constitutional: Awake, Alert, non-toxic. No acute distress. Well appearing, well nourished.   HEAD: Normocephalic, atraumatic.   EYES: PERRL, EOM intact, conjunctiva and sclera are clear bilaterally.  ENT: External ears normal. No rhinorrhea, no tracheal deviation   NECK: Supple, non-tender  CARDIOVASCULAR: regular rate and rhythm.  RESPIRATORY: Normal respiratory effort; breath sounds CTAB, no wheezes, rhonchi, or rales. Speaking in full sentences. No accessory muscle use.   ABDOMEN: Soft; non-tender, non-distended. No rebound or guarding.   EXTREMITIES:  no lower extremity edema, no deformities  SKIN: Warm, dry  NEURO: A&O x3. Sensory and motor functions are grossly intact. Speech is normal. No facial droop.  PSYCH: Appearance and judgement seem appropriate for gender and age. Conversational w/ passive SI.

## 2022-12-22 NOTE — ED STATDOCS - MUSCULOSKELETAL, MLM
Mónica Schilling 59  OCCUPATIONAL THERAPY MISSED TREATMENT NOTE  [facility] ONC MED 5K  5K-20/[id number]-A      Date: 2022  Patient Name: Isacc Key        CSN: [id number]   : 1963  (60 y.o.)  Gender: male   Referring Practitioner: Renee Angel MD  Diagnosis: emphysematous cycstitis         REASON FOR MISSED TREATMENT: Patient Unavailable. Patient in bathroom and attempting to have BM to be able to discharge to rehab today.   Requesting increased time range of motion is not limited and there is no muscle tenderness.

## 2023-01-01 NOTE — ED ADULT TRIAGE NOTE - MODE OF ARRIVAL
Walk in

## 2023-01-01 NOTE — ED ADULT NURSE NOTE - TEMPLATE LIST FOR HEAD TO TOE ASSESSMENT
Continue Nutramigen for now and mix how your Pediatrician showed you.  You can stop the rice cereal  Follow up with me in 3 months when he is around 5-6 months and we will discuss switching back to a regular formula.   Call the GI office with questions/concerns, [phone].   General

## 2023-04-12 NOTE — DISCHARGE NOTE ADULT - PATIENT PORTAL LINK FT
You can access the [facility] Portal, offered by [facility], by registering with the following website: [email or web address]

## 2023-04-27 NOTE — ED STATDOCS - CARE PLAN
[FreeTextEntry1] : Follow up on breast. No more drainage and area feels better after antibiotics. Labs wnl and no follow up required. Aunt on phone during visit and states that she made appointments for Derm and Dental care.She is also having her  immunizations updated near her home. Offered that we can update shots here but Mom is having them done elsewhere and will get a copy to us. Introduced pt to SW in HC today to start MH visits for family problems.  Principal Discharge DX:	Medication refill

## 2023-06-19 NOTE — ED ADULT TRIAGE NOTE - CHIEF COMPLAINT QUOTE
[FreeTextEntry1] : I, Roselia Bradforddevon, acted solely as a scribe for Dr. Matthew Abdalla M.D. on this date 06/19/2023. \par \par All medical record entries made by the Scribe were at my, Dr. Matthew Abdalla M.D., direction and personally dictated by me on 06/19/2023. I have reviewed the chart and agree that the record accurately reflects my personal performance of the history, physical exam, assessment and plan. I have also personally directed, reviewed, and agreed with the chart.  pt c/o depression and wants his medications adjusted. pt denies SI/HI or hallucinations in triage. pt was seen at [facility] in the beginning of november and was transferred to [facility]. pt received treatment at [facility] for appx 3 weeks and went to a rehab [facility] for appx 3-4 days. pt has been home now for nearly 1 week and feels his mental health is declining. pt requesting to go back to [facility] for medication adjustment. pt has been taking medications as prescribed: lamictal 100mg BID, effexor 37.5mg once daily, lithium 1200 mg (takes 4 tabs of the 300mg at bedtime), Revia 50mg daily.
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
## 2024-03-12 NOTE — PATIENT PROFILE ADULT. - FUNCTIONAL SCREEN CURRENT LEVEL: TRANSFERRING, MLM
Constitutional:  well developed, well nourished, no acute distress.   Eyes:  normal conjunctiva.   Neck:  normal venous pressure.   Cardiac:  normal S1, S2, no murmur.   Pulmonary:  clear lung fields, good air entry, no respiratory distress.   Abdomen:  abdomen soft.   Musculoskeletal:  normal gait.   Extremities:  no edema, no cyanosis.   Skin:  no rash, no skin lesions.   Neurological:  moves all extremities, normal speech.   Psychiatric:  alert and oriented, normal memory.
(0) independent
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
## 2024-12-11 ENCOUNTER — NON-APPOINTMENT (OUTPATIENT)
Age: 34
End: 2024-12-11

## 2025-03-03 ENCOUNTER — EMERGENCY (EMERGENCY)
Facility: HOSPITAL | Age: 35
LOS: 0 days | Discharge: HOME | End: 2025-03-03
Attending: EMERGENCY MEDICINE
Payer: MEDICAID

## 2025-03-03 VITALS
RESPIRATION RATE: 18 BRPM | SYSTOLIC BLOOD PRESSURE: 137 MMHG | DIASTOLIC BLOOD PRESSURE: 84 MMHG | OXYGEN SATURATION: 96 % | TEMPERATURE: 98 F | WEIGHT: 238.1 LBS | HEART RATE: 87 BPM

## 2025-03-03 VITALS
TEMPERATURE: 97 F | SYSTOLIC BLOOD PRESSURE: 115 MMHG | OXYGEN SATURATION: 98 % | DIASTOLIC BLOOD PRESSURE: 58 MMHG | RESPIRATION RATE: 19 BRPM | HEART RATE: 81 BPM

## 2025-03-03 DIAGNOSIS — J34.89 OTHER SPECIFIED DISORDERS OF NOSE AND NASAL SINUSES: ICD-10-CM

## 2025-03-03 DIAGNOSIS — Z98.890 OTHER SPECIFIED POSTPROCEDURAL STATES: Chronic | ICD-10-CM

## 2025-03-03 DIAGNOSIS — R07.0 PAIN IN THROAT: ICD-10-CM

## 2025-03-03 DIAGNOSIS — F32.A DEPRESSION, UNSPECIFIED: ICD-10-CM

## 2025-03-03 LAB
ALBUMIN SERPL ELPH-MCNC: 3.6 G/DL — SIGNIFICANT CHANGE UP (ref 3.3–5)
ALP SERPL-CCNC: 106 U/L — SIGNIFICANT CHANGE UP (ref 40–120)
ALT FLD-CCNC: 25 U/L — SIGNIFICANT CHANGE UP (ref 12–78)
ANION GAP SERPL CALC-SCNC: 6 MMOL/L — SIGNIFICANT CHANGE UP (ref 5–17)
AST SERPL-CCNC: 15 U/L — SIGNIFICANT CHANGE UP (ref 15–37)
BILIRUB SERPL-MCNC: 0.4 MG/DL — SIGNIFICANT CHANGE UP (ref 0.2–1.2)
BUN SERPL-MCNC: 13 MG/DL — SIGNIFICANT CHANGE UP (ref 7–23)
CALCIUM SERPL-MCNC: 9.1 MG/DL — SIGNIFICANT CHANGE UP (ref 8.5–10.1)
CHLORIDE SERPL-SCNC: 108 MMOL/L — SIGNIFICANT CHANGE UP (ref 96–108)
CO2 SERPL-SCNC: 26 MMOL/L — SIGNIFICANT CHANGE UP (ref 22–31)
CREAT SERPL-MCNC: 0.99 MG/DL — SIGNIFICANT CHANGE UP (ref 0.5–1.3)
EGFR: 102 ML/MIN/1.73M2 — SIGNIFICANT CHANGE UP
GLUCOSE SERPL-MCNC: 106 MG/DL — HIGH (ref 70–99)
HCT VFR BLD CALC: 34.5 % — LOW (ref 39–50)
HGB BLD-MCNC: 11.6 G/DL — LOW (ref 13–17)
MCHC RBC-ENTMCNC: 28.3 PG — SIGNIFICANT CHANGE UP (ref 27–34)
MCHC RBC-ENTMCNC: 33.6 G/DL — SIGNIFICANT CHANGE UP (ref 32–36)
MCV RBC AUTO: 84.1 FL — SIGNIFICANT CHANGE UP (ref 80–100)
NRBC # BLD AUTO: 0 K/UL — SIGNIFICANT CHANGE UP (ref 0–0)
NRBC # FLD: 0 K/UL — SIGNIFICANT CHANGE UP (ref 0–0)
NRBC BLD AUTO-RTO: 0 /100 WBCS — SIGNIFICANT CHANGE UP (ref 0–0)
PLATELET # BLD AUTO: 313 K/UL — SIGNIFICANT CHANGE UP (ref 150–400)
PMV BLD: 9.2 FL — SIGNIFICANT CHANGE UP (ref 7–13)
POTASSIUM SERPL-MCNC: 3.5 MMOL/L — SIGNIFICANT CHANGE UP (ref 3.5–5.3)
POTASSIUM SERPL-SCNC: 3.5 MMOL/L — SIGNIFICANT CHANGE UP (ref 3.5–5.3)
PROT SERPL-MCNC: 7.5 GM/DL — SIGNIFICANT CHANGE UP (ref 6–8.3)
RBC # BLD: 4.1 M/UL — LOW (ref 4.2–5.8)
RBC # FLD: 13.3 % — SIGNIFICANT CHANGE UP (ref 10.3–14.5)
S PYO AG SPEC QL IA: POSITIVE
SODIUM SERPL-SCNC: 140 MMOL/L — SIGNIFICANT CHANGE UP (ref 135–145)
WBC # BLD: 12.87 K/UL — HIGH (ref 3.8–10.5)
WBC # FLD AUTO: 12.87 K/UL — HIGH (ref 3.8–10.5)

## 2025-03-03 PROCEDURE — 99284 EMERGENCY DEPT VISIT MOD MDM: CPT

## 2025-03-03 PROCEDURE — 99284 EMERGENCY DEPT VISIT MOD MDM: CPT | Mod: 25

## 2025-03-03 PROCEDURE — 96374 THER/PROPH/DIAG INJ IV PUSH: CPT

## 2025-03-03 PROCEDURE — 80053 COMPREHEN METABOLIC PANEL: CPT

## 2025-03-03 PROCEDURE — 85027 COMPLETE CBC AUTOMATED: CPT

## 2025-03-03 PROCEDURE — 87880 STREP A ASSAY W/OPTIC: CPT

## 2025-03-03 PROCEDURE — 96375 TX/PRO/DX INJ NEW DRUG ADDON: CPT

## 2025-03-03 PROCEDURE — 96372 THER/PROPH/DIAG INJ SC/IM: CPT | Mod: XU

## 2025-03-03 PROCEDURE — 36415 COLL VENOUS BLD VENIPUNCTURE: CPT

## 2025-03-03 RX ORDER — KETOROLAC TROMETHAMINE 30 MG/ML
30 INJECTION, SOLUTION INTRAMUSCULAR; INTRAVENOUS ONCE
Refills: 0 | Status: DISCONTINUED | OUTPATIENT
Start: 2025-03-03 | End: 2025-03-03

## 2025-03-03 RX ORDER — PENICILLIN G BENZATHINE 2.4MM/4ML
1.2 SYRINGE (ML) INTRAMUSCULAR ONCE
Refills: 0 | Status: COMPLETED | OUTPATIENT
Start: 2025-03-03 | End: 2025-03-03

## 2025-03-03 RX ORDER — DEXAMETHASONE 0.5 MG/1
10 TABLET ORAL ONCE
Refills: 0 | Status: COMPLETED | OUTPATIENT
Start: 2025-03-03 | End: 2025-03-03

## 2025-03-03 RX ORDER — AMPICILLIN SODIUM AND SULBACTAM SODIUM 1; .5 G/1; G/1
3 INJECTION, POWDER, FOR SOLUTION INTRAMUSCULAR; INTRAVENOUS ONCE
Refills: 0 | Status: COMPLETED | OUTPATIENT
Start: 2025-03-03 | End: 2025-03-03

## 2025-03-03 RX ADMIN — Medication 1000 MILLILITER(S): at 09:56

## 2025-03-03 RX ADMIN — KETOROLAC TROMETHAMINE 30 MILLIGRAM(S): 30 INJECTION, SOLUTION INTRAMUSCULAR; INTRAVENOUS at 09:56

## 2025-03-03 RX ADMIN — Medication 1.2 MILLION UNIT(S): at 12:51

## 2025-03-03 RX ADMIN — DEXAMETHASONE 102 MILLIGRAM(S): 0.5 TABLET ORAL at 11:05

## 2025-03-03 RX ADMIN — AMPICILLIN SODIUM AND SULBACTAM SODIUM 200 GRAM(S): 1; .5 INJECTION, POWDER, FOR SOLUTION INTRAMUSCULAR; INTRAVENOUS at 10:34

## 2025-03-03 NOTE — ED ADULT NURSE NOTE - NS_ED_NURSE_TEACHING_TOPIC_ED_A_ED
Pt educated on all d/c instructions with return verbal understanding on all d/c instructions to myself at this time.

## 2025-03-03 NOTE — ED PROVIDER NOTE - CARE PLAN
1 Principal Discharge DX:	Acute tonsillitis   Principal Discharge DX:	Streptococcal tonsillopharyngitis

## 2025-03-03 NOTE — ED PROVIDER NOTE - PHYSICAL EXAMINATION
Constitutional: NAD, pt appears mildly uncomfortable sitting on stretcher.  Neg hot potato voice.    Eyes: EOMI, pupils equal  Head: Normocephalic atraumatic  Mouth: Oropharynx erythematous with 3+ tonsils bilat.  Uvula midline and rises.     Cardiac: regular rate   Resp: Lungs CTA B.  Neg wheezes, rhonchi, crackles.    GI: Abd s/nt/nd  Neuro: CN2-12 intact  Skin: No rashes Constitutional: NAD, pt appears mildly uncomfortable sitting on stretcher.  Neg hot potato voice.    Eyes: EOMI, pupils equal  Head: Normocephalic atraumatic  Mouth: Oropharynx erythematous with 3+ tonsils bilat.  Uvula midline and rises.     Cardiac: regular rate   Resp: Lungs CTA B.  Neg wheezes, rhonchi, crackles.    GI: Abd s/nt/nd  Neuro: CN2-12 intact  Skin: No rashes    RAO Saunders MD, ED Attdg:  Pt evaluated after IV steroids & Abx, reports + symptomatic alleviation.  Gen'l: WM adult, alert, no respiratory discomfort, no sentence shortening, mildly ill, non-toxic.  Head: NC/AT  Eyes: PERRL, EOMI, no periorbital edema  ENT:  mm slightly dry.  Posterior O/P + erythema w/ 3+ inflamed tonsils,  B/L symmetric, no exudate. Uvula M/L. Normal voice. Tolerating own secretions well.  CV: RRR, normal radial pulse  Lungs: CTA, normal respirations  GI: soft, NT, BS+  : Deferred, no flank nor CVAT  Neck: NT, supple w/o pain, no stiffness nor meningismus  MSK: DRAKE x 4, no focal extremity swelling nor tenderness, B/L SLR 35 degrees w/o pain, normal motor  Skin: no tactile warmth, no rash  Neuro: A+O x 4, CN 2 -12 intact, normal speech, no focal motor/sensory deficits

## 2025-03-03 NOTE — ED ADULT TRIAGE NOTE - CHIEF COMPLAINT QUOTE
pt ambulatory to triage c/o throat swelling x2 days. -allergies. pmh anxiety, depression. no respiratory distress noted at this time.

## 2025-03-03 NOTE — ED ADULT NURSE NOTE - NSFALLUNIVINTERV_ED_ALL_ED
Bed/Stretcher in lowest position, wheels locked, appropriate side rails in place/Call bell, personal items and telephone in reach/Instruct patient to call for assistance before getting out of bed/chair/stretcher/Non-slip footwear applied when patient is off stretcher/[place] to call system/Physically safe environment - no spills, clutter or unnecessary equipment/Purposeful proactive rounding/Room/bathroom lighting operational, light cord in reach
6

## 2025-03-03 NOTE — ED PROVIDER NOTE - CLINICAL SUMMARY MEDICAL DECISION MAKING FREE TEXT BOX
35 yo WM, past h/o peritonsillar abscess, ambulatory to ED c/o'ing 2 dd. worsening throat pain & swelling sensation, + odynophagia.  No F/C.  +/- voice change.  No cp nor SOB.  Exam: mildly ill, non-toxic. ENT: Posterior O/P + erythema w/ 3+ inflamed tonsils,  B/L symmetric, no exudate. Uvula M/L. Normal voice. Tolerating own secretions well.    Clinical concern for Strep throat, tonsillitis/pharyngitis.  Not likely allergic in nature.  Plan: IVF, labs incl. rapid Strep, IV Decadron & Unasyn pending labs results.  Observe, reassess.    RAO Saunders MD:  Pt w/ symptomatic relief s/p IVF/Unasyn/Decadron. Labs notable for elev. WBC 12.8, rapid Strep +.  Pt informed of study results incl. Dx of acute Streptococcal tonsillopharyngitis.  Pt offered po Abx vs. Bicillin IM: pt opted for IM Bicillin (+ ordered). Pt for DC after receives IM Abx.

## 2025-03-03 NOTE — ED PROVIDER NOTE - ATTENDING APP SHARED VISIT CONTRIBUTION OF CARE
BEN Saunders MD, ED Attending physician:  This was a shared visit with RACHEL.  I reviewed and verified the documentation and independently performed the documented history/exam/mdm.

## 2025-03-03 NOTE — ED PROVIDER NOTE - NSFOLLOWUPINSTRUCTIONS_ED_ALL_ED_FT
Continue Ibuprofen every 6 hours for pain.      Continue sips of liquid frequently with soft foods.      Bicillin shot will remain in your system over the next week treating the Strep throat infection.      Make a follow up appointment with ENT.      English    Tonsillitis  An open mouth with the tongue lifted showing the tonsils.  Tonsillitis is an infection of the throat that causes the tonsils to become red, tender, and swollen. Tonsils are tissues in the back of your throat. Each tonsil has crevices (crypts). Tonsils normally work to protect the body from infection.    What are the causes?  Sudden (acute) tonsillitis may be caused by a virus or bacteria, including streptococcal bacteria. Long-lasting (chronic) tonsillitis occurs when the crypts of the tonsils become filled with pieces of food and bacteria, which makes it easy for the tonsils to become repeatedly infected.    Tonsillitis can be spread from person to person when it is caused by a virus or bacteria. It may be spread by inhaling droplets that are released with coughing or sneezing. You may also come into contact with viruses or bacteria on surfaces, such as cups or utensils.    What are the signs or symptoms?  Symptoms of this condition include:  A sore throat. This may include trouble swallowing.  White patches on the tonsils.  Swollen tonsils.  Fever.  Headache.  Tiredness.  Loss of appetite.  Snoring during sleep when you did not snore before.  Small, foul-smelling, yellowish-white pieces of material (tonsilloliths) that you occasionally cough up or spit out. These can cause you to have bad breath.  How is this diagnosed?  This condition is diagnosed with a physical exam. Diagnosis can be confirmed with the results of lab tests, including a throat culture.    How is this treated?  Treatment for this condition depends on the cause, but usually focuses on treating the symptoms associated with it. Treatment may include:  Medicines to relieve pain and manage fever.  Steroid medicines to reduce swelling.  Antibiotic medicines if the condition is caused by bacteria.  If episodes of tonsillitis are severe and frequent, your health care provider may recommend surgery to remove the tonsils (tonsillectomy).    Follow these instructions at home:  Medicines    Take over-the-counter and prescription medicines only as told by your health care provider.  If you were prescribed an antibiotic medicine, take it as told by your health care provider. Do not stop taking the antibiotic even if you start to feel better.  Eating and drinking    Drink enough fluid to keep your urine pale yellow.  While your throat is sore, eat soft or liquid foods, such as sherbet, soups, or soft, warm cereals, such as oatmeal or hot wheat cereal.  Drink warm liquids.  Eat frozen ice pops.  General instructions    Rest as much as possible and get plenty of sleep.  Gargle with a mixture of salt and water 3–4 times a day or as needed.  To make salt water, completely dissolve ½–1 tsp (3–6 g) of salt in 1 cup (237 mL) of warm water.  Do not swallow the mixture of salt and water.  Wash your hands regularly with soap and water for at least 20 seconds. If soap and water are not available, use hand .  Do not share cups, bottles, or other utensils until your symptoms have gone away.  Do not use any products that contain nicotine or tobacco. These products include cigarettes, chewing tobacco, and vaping devices, such as e-cigarettes. If you need help quitting, ask your health care provider.  Keep all follow-up visits. This is important.  Contact a health care provider if:  You notice large, tender lumps in your neck that were not there before.  You have a fever that does not go away after 2–3 days.  You develop a rash.  You cough up a green, yellow-brown, or bloody substance.  You cannot swallow liquids or food for 24 hours.  Only one of your tonsils is swollen.  Get help right away if:  You develop any new symptoms, such as vomiting, severe headache, stiff neck, chest pain, trouble breathing, or trouble swallowing.  You have severe throat pain along with drooling or voice changes.  You have severe pain that is not controlled with medicines.  You cannot fully open your mouth.  You develop redness, swelling, or severe pain anywhere in your neck.  Summary  Tonsillitis is an infection of the throat that causes the tonsils to become red, tender, and swollen. The most common symptom is pain in the throat.  Tonsillitis is most often caused by a virus or bacteria.  Get help right away if you develop any new symptoms, such as vomiting, severe headache, stiff neck, chest pain, or trouble breathing.  This information is not intended to replace advice given to you by your health care provider. Make sure you discuss any questions you have with your health care provider.    Document Revised: 05/11/2022 Document Reviewed: 05/12/2022  Elsevier Patient Education © 2024 [employer]  [employer] logo  Terms and Conditions  Privacy Policy  Editorial Policy  All content on this site: Copyright © 2025 Elsevier, its licensors, and contributors. All rights are reserved, including those for text and data mining, AI training, and similar technologies. For all open access content, the Creative Commons licensing terms apply.  Cookies are used by this site. To decline or learn more, visi

## 2025-03-03 NOTE — ED PROVIDER NOTE - OBJECTIVE STATEMENT
34-year-old male with past medical history of peritonsillar abscess, anxiety and depression presents to the ED complaining of 2 days of swelling and pain to throat.  Patient denies having fevers or chills patient reports increasing pain and difficulty swallowing last night.  Patient reports like his throat was closing up while trying to swallow chicken nuggets.  Patient does report sensation has decreased this morning but still has pain and difficulty swallowing liquids.  Patient reports this does not feel the same as when he had the peritonsillar abscess 2 years ago. 34-year-old male with past medical history of peritonsillar abscess, anxiety and depression presents to the ED complaining of 2 days of swelling and pain to throat.  Patient denies having fevers or chills patient reports increasing pain and difficulty swallowing last night.  Patient reports like his throat was closing up while trying to swallow chicken nuggets.  Patient does report sensation has decreased this morning but still has pain and difficulty swallowing liquids.  Patient reports this does not feel the same as when he had the peritonsillar abscess 2 years ago.    RAO Saunders MD, ED Attdg:  Case d/w ED PA.  Pt seen/evaluated in ED.  33 yo WM, past h/o peritonsillar abscess, ambulatory to ED c/o'ing 2 dd. worsening throat pain & swelling sensation, + odynophagia.  No F/C.  +/- voice change.  No cp nor SOB.

## 2025-03-03 NOTE — ED ADULT NURSE NOTE - OBJECTIVE STATEMENT
PT presents to er with complaints of a sore throat and swelling for the past few days, denies fevers, pt is able to eat/drink, denies recent illnesses.

## 2025-03-03 NOTE — ED PROVIDER NOTE - PATIENT PORTAL LINK FT
You can access the FollowMyHealth Patient Portal offered by [facility] by registering at the following website: [email or web address]. By joining [employer]’s FollowMyHealth portal, you will also be able to view your health information using other applications (apps) compatible with our system.

## 2025-03-03 NOTE — ED PROVIDER NOTE - NS ED ROS FT
ROS: Constitutional- Neg fever, Neg chills.  Respiratory- Neg cough, Neg SOB  Cardiac- no chest pain, no palpitations, ENT- (+) rhinorrhea, (+) sore throat, (+) difficulty swallowing no congestion.  Abdomen- No nausea, no vomiting, no diarrhea.  Urinary- no dysuria, no urgency, no frequency.  Skin- No rashes

## 2025-04-03 NOTE — ED ADULT NURSE NOTE - NS ED NURSE RECORD ANOTHER HT AND WT
Mom arrived late for ulisses appointment and was able to be seen. Mom states child has been coughing. She states it sound like he has mucous in his chest. Also, mom feels he is allergic to something, because he has a rash all over his body. The appointment was rescheduled.  
No

## 2025-04-22 ENCOUNTER — OFFICE (OUTPATIENT)
Dept: URBAN - METROPOLITAN AREA CLINIC 102 | Facility: CLINIC | Age: 35
Setting detail: OPHTHALMOLOGY
End: 2025-04-22
Payer: MEDICAID

## 2025-04-22 DIAGNOSIS — H33.8: ICD-10-CM

## 2025-04-22 DIAGNOSIS — H43.811: ICD-10-CM

## 2025-04-22 DIAGNOSIS — H40.013: ICD-10-CM

## 2025-04-22 DIAGNOSIS — H43.392: ICD-10-CM

## 2025-04-22 PROBLEM — H52.7 REFRACTIVE ERROR: Status: ACTIVE | Noted: 2025-04-22

## 2025-04-22 PROCEDURE — 92250 FUNDUS PHOTOGRAPHY W/I&R: CPT | Performed by: OPHTHALMOLOGY

## 2025-04-22 PROCEDURE — 92004 COMPRE OPH EXAM NEW PT 1/>: CPT | Performed by: OPHTHALMOLOGY

## 2025-04-22 ASSESSMENT — REFRACTION_AUTOREFRACTION
OS_SPHERE: -4.75
OD_CYLINDER: -3.25
OS_CYLINDER: -3.25
OD_SPHERE: -7.00
OD_AXIS: 009
OS_AXIS: 161

## 2025-04-22 ASSESSMENT — REFRACTION_MANIFEST
OS_CYLINDER: -3.25
OD_AXIS: 009
OS_AXIS: 161
OD_CYLINDER: -3.25
OD_SPHERE: -7.00
OS_SPHERE: -4.75

## 2025-04-22 ASSESSMENT — REFRACTION_CURRENTRX
OS_ADD: -0.50
OD_VPRISM_DIRECTION: PROGS
OS_CYLINDER: -2.75
OS_SPHERE: -4.25
OS_OVR_VA: 20/
OD_SPHERE: -6.25
OD_ADD: -0.50
OD_CYLINDER: -2.75
OS_VPRISM_DIRECTION: PROGS
OS_AXIS: 155
OD_OVR_VA: 20/
OD_AXIS: 009

## 2025-04-22 ASSESSMENT — TONOMETRY
OD_IOP_MMHG: 11
OS_IOP_MMHG: 11

## 2025-04-22 ASSESSMENT — KERATOMETRY
OS_K2POWER_DIOPTERS: 43.00
OD_AXISANGLE_DEGREES: 096
OD_K2POWER_DIOPTERS: 43.00
OD_K1POWER_DIOPTERS: 40.50
OS_AXISANGLE_DEGREES: 073
OS_K1POWER_DIOPTERS: 40.75

## 2025-04-22 ASSESSMENT — VISUAL ACUITY
OD_BCVA: 20/30-2
OS_BCVA: 20/30+1

## 2025-04-22 ASSESSMENT — CONFRONTATIONAL VISUAL FIELD TEST (CVF)
OS_FINDINGS: FULL
OD_FINDINGS: FULL

## 2025-04-29 NOTE — ED BEHAVIORAL HEALTH ASSESSMENT NOTE - NS ED BHA MED ROS HEMATOLOGIC LYMPHATIC
Blood consent form signed and placed in soft chart   Electronically signed by Meredith Morocho RN on 4/29/2025 at 6:10 AM     No complaints

## 2025-07-03 ENCOUNTER — EMERGENCY (EMERGENCY)
Facility: HOSPITAL | Age: 35
LOS: 0 days | Discharge: HOME | End: 2025-07-03
Attending: STUDENT IN AN ORGANIZED HEALTH CARE EDUCATION/TRAINING PROGRAM
Payer: MEDICAID

## 2025-07-03 VITALS
OXYGEN SATURATION: 100 % | TEMPERATURE: 98 F | RESPIRATION RATE: 18 BRPM | HEART RATE: 65 BPM | DIASTOLIC BLOOD PRESSURE: 85 MMHG | SYSTOLIC BLOOD PRESSURE: 134 MMHG | WEIGHT: 217.6 LBS

## 2025-07-03 VITALS
RESPIRATION RATE: 17 BRPM | TEMPERATURE: 98 F | OXYGEN SATURATION: 100 % | SYSTOLIC BLOOD PRESSURE: 125 MMHG | HEART RATE: 65 BPM | DIASTOLIC BLOOD PRESSURE: 81 MMHG

## 2025-07-03 DIAGNOSIS — M25.572 PAIN IN LEFT ANKLE AND JOINTS OF LEFT FOOT: ICD-10-CM

## 2025-07-03 DIAGNOSIS — L03.116 CELLULITIS OF LEFT LOWER LIMB: ICD-10-CM

## 2025-07-03 DIAGNOSIS — F90.9 ATTENTION-DEFICIT HYPERACTIVITY DISORDER, UNSPECIFIED TYPE: ICD-10-CM

## 2025-07-03 DIAGNOSIS — F41.9 ANXIETY DISORDER, UNSPECIFIED: ICD-10-CM

## 2025-07-03 DIAGNOSIS — F32.A DEPRESSION, UNSPECIFIED: ICD-10-CM

## 2025-07-03 DIAGNOSIS — Z98.890 OTHER SPECIFIED POSTPROCEDURAL STATES: Chronic | ICD-10-CM

## 2025-07-03 LAB
ALBUMIN SERPL ELPH-MCNC: 3.2 G/DL — LOW (ref 3.3–5)
ALP SERPL-CCNC: 111 U/L — SIGNIFICANT CHANGE UP (ref 40–120)
ALT FLD-CCNC: 21 U/L — SIGNIFICANT CHANGE UP (ref 12–78)
ANION GAP SERPL CALC-SCNC: 3 MMOL/L — LOW (ref 5–17)
APTT BLD: 30.9 SEC — SIGNIFICANT CHANGE UP (ref 26.1–36.8)
AST SERPL-CCNC: 11 U/L — LOW (ref 15–37)
BASOPHILS # BLD AUTO: 0.03 K/UL — SIGNIFICANT CHANGE UP (ref 0–0.2)
BASOPHILS NFR BLD AUTO: 0.4 % — SIGNIFICANT CHANGE UP (ref 0–2)
BILIRUB SERPL-MCNC: 0.2 MG/DL — SIGNIFICANT CHANGE UP (ref 0.2–1.2)
BUN SERPL-MCNC: 13 MG/DL — SIGNIFICANT CHANGE UP (ref 7–23)
CALCIUM SERPL-MCNC: 9.4 MG/DL — SIGNIFICANT CHANGE UP (ref 8.5–10.1)
CHLORIDE SERPL-SCNC: 106 MMOL/L — SIGNIFICANT CHANGE UP (ref 96–108)
CO2 SERPL-SCNC: 28 MMOL/L — SIGNIFICANT CHANGE UP (ref 22–31)
CREAT SERPL-MCNC: 0.77 MG/DL — SIGNIFICANT CHANGE UP (ref 0.5–1.3)
EGFR: 120 ML/MIN/1.73M2 — SIGNIFICANT CHANGE UP
EGFR: 120 ML/MIN/1.73M2 — SIGNIFICANT CHANGE UP
EOSINOPHIL # BLD AUTO: 0.13 K/UL — SIGNIFICANT CHANGE UP (ref 0–0.5)
EOSINOPHIL NFR BLD AUTO: 1.7 % — SIGNIFICANT CHANGE UP (ref 0–6)
GLUCOSE SERPL-MCNC: 78 MG/DL — SIGNIFICANT CHANGE UP (ref 70–99)
HCT VFR BLD CALC: 38.1 % — LOW (ref 39–50)
HGB BLD-MCNC: 12.4 G/DL — LOW (ref 13–17)
IMM GRANULOCYTES # BLD AUTO: 0.02 K/UL — SIGNIFICANT CHANGE UP (ref 0–0.07)
IMM GRANULOCYTES NFR BLD AUTO: 0.3 % — SIGNIFICANT CHANGE UP (ref 0–0.9)
INR BLD: 0.98 RATIO — SIGNIFICANT CHANGE UP (ref 0.85–1.16)
LACTATE SERPL-SCNC: 1.4 MMOL/L — SIGNIFICANT CHANGE UP (ref 0.7–2)
LYMPHOCYTES # BLD AUTO: 1.75 K/UL — SIGNIFICANT CHANGE UP (ref 1–3.3)
LYMPHOCYTES NFR BLD AUTO: 22.9 % — SIGNIFICANT CHANGE UP (ref 13–44)
MCHC RBC-ENTMCNC: 28.1 PG — SIGNIFICANT CHANGE UP (ref 27–34)
MCHC RBC-ENTMCNC: 32.5 G/DL — SIGNIFICANT CHANGE UP (ref 32–36)
MCV RBC AUTO: 86.4 FL — SIGNIFICANT CHANGE UP (ref 80–100)
MONOCYTES # BLD AUTO: 0.43 K/UL — SIGNIFICANT CHANGE UP (ref 0–0.9)
MONOCYTES NFR BLD AUTO: 5.6 % — SIGNIFICANT CHANGE UP (ref 2–14)
NEUTROPHILS # BLD AUTO: 5.28 K/UL — SIGNIFICANT CHANGE UP (ref 1.8–7.4)
NEUTROPHILS NFR BLD AUTO: 69.1 % — SIGNIFICANT CHANGE UP (ref 43–77)
NRBC # BLD AUTO: 0 K/UL — SIGNIFICANT CHANGE UP (ref 0–0)
NRBC # FLD: 0 K/UL — SIGNIFICANT CHANGE UP (ref 0–0)
NRBC BLD AUTO-RTO: 0 /100 WBCS — SIGNIFICANT CHANGE UP (ref 0–0)
PLATELET # BLD AUTO: 380 K/UL — SIGNIFICANT CHANGE UP (ref 150–400)
PMV BLD: 8.9 FL — SIGNIFICANT CHANGE UP (ref 7–13)
POTASSIUM SERPL-MCNC: 4.3 MMOL/L — SIGNIFICANT CHANGE UP (ref 3.5–5.3)
POTASSIUM SERPL-SCNC: 4.3 MMOL/L — SIGNIFICANT CHANGE UP (ref 3.5–5.3)
PROT SERPL-MCNC: 7.8 GM/DL — SIGNIFICANT CHANGE UP (ref 6–8.3)
PROTHROM AB SERPL-ACNC: 11.3 SEC — SIGNIFICANT CHANGE UP (ref 9.9–13.4)
RBC # BLD: 4.41 M/UL — SIGNIFICANT CHANGE UP (ref 4.2–5.8)
RBC # FLD: 14.3 % — SIGNIFICANT CHANGE UP (ref 10.3–14.5)
SODIUM SERPL-SCNC: 137 MMOL/L — SIGNIFICANT CHANGE UP (ref 135–145)
WBC # BLD: 7.64 K/UL — SIGNIFICANT CHANGE UP (ref 3.8–10.5)
WBC # FLD AUTO: 7.64 K/UL — SIGNIFICANT CHANGE UP (ref 3.8–10.5)

## 2025-07-03 PROCEDURE — 73610 X-RAY EXAM OF ANKLE: CPT | Mod: 26,LT

## 2025-07-03 PROCEDURE — 96374 THER/PROPH/DIAG INJ IV PUSH: CPT

## 2025-07-03 PROCEDURE — 85025 COMPLETE CBC W/AUTO DIFF WBC: CPT

## 2025-07-03 PROCEDURE — 85610 PROTHROMBIN TIME: CPT

## 2025-07-03 PROCEDURE — 73620 X-RAY EXAM OF FOOT: CPT | Mod: 26,LT

## 2025-07-03 PROCEDURE — 96375 TX/PRO/DX INJ NEW DRUG ADDON: CPT

## 2025-07-03 PROCEDURE — 73610 X-RAY EXAM OF ANKLE: CPT | Mod: LT

## 2025-07-03 PROCEDURE — 85730 THROMBOPLASTIN TIME PARTIAL: CPT

## 2025-07-03 PROCEDURE — 73620 X-RAY EXAM OF FOOT: CPT | Mod: LT

## 2025-07-03 PROCEDURE — 87040 BLOOD CULTURE FOR BACTERIA: CPT

## 2025-07-03 PROCEDURE — 99285 EMERGENCY DEPT VISIT HI MDM: CPT | Mod: 25

## 2025-07-03 PROCEDURE — 80053 COMPREHEN METABOLIC PANEL: CPT

## 2025-07-03 PROCEDURE — 36415 COLL VENOUS BLD VENIPUNCTURE: CPT

## 2025-07-03 PROCEDURE — 99285 EMERGENCY DEPT VISIT HI MDM: CPT

## 2025-07-03 PROCEDURE — 83605 ASSAY OF LACTIC ACID: CPT

## 2025-07-03 RX ORDER — CEPHALEXIN 250 MG/1
1 CAPSULE ORAL
Qty: 21 | Refills: 0
Start: 2025-07-03 | End: 2025-07-09

## 2025-07-03 RX ORDER — CEFTRIAXONE 500 MG/1
1000 INJECTION, POWDER, FOR SOLUTION INTRAMUSCULAR; INTRAVENOUS ONCE
Refills: 0 | Status: DISCONTINUED | OUTPATIENT
Start: 2025-07-03 | End: 2025-07-03

## 2025-07-03 RX ORDER — VANCOMYCIN HCL IN 5 % DEXTROSE 1.5G/250ML
1500 PLASTIC BAG, INJECTION (ML) INTRAVENOUS ONCE
Refills: 0 | Status: COMPLETED | OUTPATIENT
Start: 2025-07-03 | End: 2025-07-03

## 2025-07-03 RX ORDER — SULFAMETHOXAZOLE/TRIMETHOPRIM 800-160 MG
1 TABLET ORAL
Qty: 14 | Refills: 0
Start: 2025-07-03 | End: 2025-07-09

## 2025-07-03 RX ORDER — CEFTRIAXONE 500 MG/1
1000 INJECTION, POWDER, FOR SOLUTION INTRAMUSCULAR; INTRAVENOUS ONCE
Refills: 0 | Status: COMPLETED | OUTPATIENT
Start: 2025-07-03 | End: 2025-07-03

## 2025-07-03 RX ADMIN — Medication 1000 MILLILITER(S): at 18:36

## 2025-07-03 RX ADMIN — CEFTRIAXONE 1000 MILLIGRAM(S): 500 INJECTION, POWDER, FOR SOLUTION INTRAMUSCULAR; INTRAVENOUS at 18:36

## 2025-07-03 RX ADMIN — Medication 250 MILLIGRAM(S): at 18:36

## 2025-07-03 NOTE — ED STATDOCS - PHYSICAL EXAMINATION
General: Patient in no acute distress, AAOX3.   HENMT: NC/AT, no nasal congestion, MMM  Neck: supple  CVS: regular rate and rhythm, no murmur  Resp: Good air entry bilaterally, No wheeze/rhonchi.  Abd: Soft non tender, non distended, +bowel sounds, No guarding, rebound tenderness   Ext: FROM in all ext, 2+ pulses throughout, cap refill<2 sec. left distal leg, ankle and foot with erythema and edema. no warmth. no TTP.   BACK: no midline tenderness, no stepoffs, no CVA ttp  NEURO: no focal deficit, gross motor and sensory intact throughout, gait stable.

## 2025-07-03 NOTE — ED STATDOCS - CLINICAL SUMMARY MEDICAL DECISION MAKING FREE TEXT BOX
concern for cellulitis, will rule out any fractures. plan: labs, imaging, IV fluids, antibiotics and reevaluate.

## 2025-07-03 NOTE — ED ADULT TRIAGE NOTE - CHIEF COMPLAINT QUOTE
pt ambulatory to triage c/o L ankle pain x3 days. pt does not recall injuring ankle. -allergies. pmh depression, bipolar

## 2025-07-03 NOTE — ED ADULT NURSE NOTE - NSFALLUNIVINTERV_ED_ALL_ED
Bed/Stretcher in lowest position, wheels locked, appropriate side rails in place/Call bell, personal items and telephone in reach/Instruct patient to call for assistance before getting out of bed/chair/stretcher/Non-slip footwear applied when patient is off stretcher/[place] to call system/Physically safe environment - no spills, clutter or unnecessary equipment/Purposeful proactive rounding/Room/bathroom lighting operational, light cord in reach

## 2025-07-03 NOTE — ED ADULT NURSE NOTE - OBJECTIVE STATEMENT
33 y/o M presenting to ED with c/o left lower leg/foot swelling, redness x3 days. patient reports he was doing basement work approx. 1 week ago when he sustained a scrape to his shin; unknown what he scraped leg on, but area where he was scraped is now healed, though scar still visible. mom reports he was bleeding at the site at time of injury. affected lower leg/foot appears cellulitic, edematous and discolored purplish red. skin blanchable upon palpation. patient reports numbness and pain started to lateral malleolus area, but has now extended into the toes. + tingling in the leg. he is ambulatory. denies fevers, chills.

## 2025-07-03 NOTE — ED STATDOCS - NSFOLLOWUPINSTRUCTIONS_ED_ALL_ED_FT
Cellulitis    WHAT YOU NEED TO KNOW:    Cellulitis is a skin infection caused by bacteria. Cellulitis may go away on its own or you may need treatment. Your healthcare provider may draw a [place] around the outside edges of your cellulitis. If your cellulitis spreads, your healthcare provider will see it outside of the [place]. Cellulitis          DISCHARGE INSTRUCTIONS:    Call 911 if:     You have sudden trouble breathing or chest pain.        Return to the emergency department if:     Your wound gets larger and more painful.       You feel a crackling under your skin when you touch it.      You have purple dots or bumps on your skin, or you see bleeding under your skin.      You have new swelling and pain in your legs.      The red, warm, swollen area gets larger.      You see red streaks coming from the infected area.    Contact your healthcare provider if:     You have a fever.      Your fever or pain does not go away or gets worse.      The area does not get smaller after 2 days of antibiotics.      Your skin is flaking or peeling off.      You have questions or concerns about your condition or care.    Medicines:     Antibiotics help treat the bacterial infection.       NSAIDs, such as ibuprofen, help decrease swelling, pain, and fever. NSAIDs can cause stomach bleeding or kidney problems in certain people. If you take blood thinner medicine, always ask if NSAIDs are safe for you. Always read the medicine label and follow directions. Do not give these medicines to children under 6 months of age without direction from your child's healthcare provider.      Acetaminophen decreases pain and fever. It is available without a doctor's order. Ask how much to take and how often to take it. Follow directions. Read the labels of all other medicines you are using to see if they also contain acetaminophen, or ask your doctor or pharmacist. Acetaminophen can cause liver damage if not taken correctly. Do not use more than 4 grams (4,000 milligrams) total of acetaminophen in one day.       Take your medicine as directed. Contact your healthcare provider if you think your medicine is not helping or if you have side effects. Tell him or her if you are allergic to any medicine. Keep a list of the medicines, vitamins, and herbs you take. Include the amounts, and when and why you take them. Bring the list or the pill bottles to follow-up visits. Carry your medicine list with you in case of an emergency.    Self-care:     Elevate the area above the level of your heart as often as you can. This will help decrease swelling and pain. Prop the area on pillows or blankets to keep it elevated comfortably.       Clean the area daily until the wound scabs over. Gently wash the area with soap and water. Pat dry. Use dressings as directed.       Place cool or warm, wet cloths on the area as directed. Use clean cloths and clean water. Leave it on the area until the cloth is room temperature. Pat the area dry with a clean, dry cloth. The cloths may help decrease pain.     Prevent cellulitis:     Do not scratch bug bites or areas of injury. You increase your risk for cellulitis by scratching these areas.       Do not share personal items, such as towels, clothing, and razors.       Clean exercise equipment with germ-killing  before and after you use it.      Wash your hands often. Use soap and water. Wash your hands after you use the bathroom, change a child's diapers, or sneeze. Wash your hands before you prepare or eat food. Use lotion to prevent dry, cracked skin. Handwashing           Wear pressure stockings as directed. You may be told to wear the stockings if you have peripheral edema. The stockings improve blood flow and decrease swelling.      Treat athlete’s foot. This can help prevent the spread of a bacterial skin infection.    Follow up with your healthcare provider within 3 days, or as directed: Your healthcare provider will check if your cellulitis is getting better. You may need different medicine. Write down your questions so you remember to ask them during your visits.

## 2025-07-03 NOTE — ED STATDOCS - PROGRESS NOTE DETAILS
34-year-old male with a past medical history of anxiety, depression, ADHD presents with left ankle pain and swelling.  Symptoms have been going on for about 3 days and over the last 2 days noticed redness developing either ankle traveling down into the toes.  Patient has tried to apply ice to the area but states that has been progressively getting worse and today he is having trouble pressure on the left foot.  Denies any trauma or injury to the ankle, except had an abrasion to the left lower shin region 1 week prior to symptoms started that has seemed to be improving.  Left ankle and foot erythematous, edematous without significant tense palpation.  Full range of motion of the left lower extremity.  DP pulse 2+ to the affected foot.  Will obtain labs, x-ray, start IV antibiotics for possible cellulitis and reevaluate. -Arun Farmer PA-C No evidence of leukocytosis.  Lactate is only 1.4.  X-rays are unremarkable.  Given normal vitals and unremarkable results will attempt to treat patient's outpatient with Bactrim and Keflex and have patient follow-up with his primary care doctor.  Patient and mom are aware and agree with plan. -Arun Farmer PA-C

## 2025-07-03 NOTE — ED STATDOCS - ATTENDING APP SHARED VISIT CONTRIBUTION OF CARE
I Mark Grey DO have personally seen and examined this patient.  I have fully participated in the care of this patient.  The initial assessment was performed by myself and then the patient was handed off to the ACP. The patient was followed and re-evaluated by the ACP. All labs, imaging and procedures were evaluated and performed by the ACP and I was available for consultation if any questions in the patients care came up.I have made amendments to the documentation where appropriate and otherwise agree with the history, physical exam, and plan as documented by the RACHEL.

## 2025-07-03 NOTE — ED STATDOCS - PATIENT PORTAL LINK FT
You can access the FollowMyHealth Patient Portal offered by [facility] by registering at the following website: [email or web address]. By joining [employer]’s FollowMyHealth portal, you will also be able to view your health information using other applications (apps) compatible with our system.

## 2025-07-03 NOTE — ED STATDOCS - NS ED ATTENDING STATEMENT MOD
Gastroenterology This was a shared visit with the RACHEL. I reviewed and verified the documentation.

## 2025-07-03 NOTE — ED STATDOCS - OBJECTIVE STATEMENT
34 year old male with PMHx of anxiety, depression, ADHD presents to ED ambulatory c/o 2-3 days of swelling to the left ankle with associated redness, progressively worsening. patient denies any trauma to the area. no falls. patient reports that 2 days ago he had pain to the ankle for 4 hours however it has significantly improved. denies fever, chills, n/v/d.

## 2025-07-12 NOTE — ED BEHAVIORAL HEALTH ASSESSMENT NOTE - NSBHMSESPEECH_PSY_A_CORE
[facility] EMERGENCY DEPARTMENT     EMERGENCY DEPARTMENT ENCOUNTER            Pt Name: Elliott Lama   MRN: [id number]   Birthdate 1990   Date of evaluation: 7/11/2025   Provider: Elliott Ratliff MD   PCP: No primary care provider on file.   Note Started: 11:21 PM EDT 7/11/25          CHIEF COMPLAINT     Chief Complaint   Patient presents with    Illness     Pt from home c/o ear pain, knot in chest, vomiting and diarrhea, that started a couple days ago. Pt went to urgent care.             HISTORY OF PRESENT ILLNESS:   History from : Patient   Limitations to history : None     Elliott Lama is a 34 y.o. male who presents with ear pain not in stomach, nausea vomiting diarrhea.    Patient has a history of nephrolithiasis obesity.    He states that he has had nausea vomiting and diarrhea for the past 3 days.  He is unsure of the cause.  He did see an urgent care provider who gave him Zofran but did not do labs or imaging.  He states that the Zofran has helped him but then when it wears off he has an episode of vomiting again.  He denies any abdominal pain with this.  He denies sick contacts at home.    He also states that he has had bilateral ear pain over this timeframe as well.  He states that the urgent care did not look at his ears.  Denies fevers congestion sore throat.  Denies loss of hearing or redness or rash around the ear.    He also believes he was just inside that there was a knot in there.  He also states that when he eats he gets spasm-like pain  from his throat to his stomach as the food goes down.    Denies smoking, cocaine.    Nursing Notes were all reviewed and agreed with, or any disagreements were addressed in the HPI.     REVIEW OF SYSTEMS :    Positives and Pertinent negatives as per HPI.      MEDICAL HISTORY   has a past medical history of Kidney calculi and Obesity.    Past Surgical History:   Procedure Laterality Date    CYSTOSCOPY Right 1/30/2024    CYSTOSCOPY RIGHT    Social Determinants: None   Chronic Conditions:  obesity    Records Reviewed: NA          I am the Primary Clinician of Record.        FINAL IMPRESSION    1. Diarrhea, unspecified type    2. Nausea    3. Ear pain, bilateral           DISPOSITION/PLAN:           Pt discharged home in stable condition.     PATIENT REFERRED TO:   NNAMDI Cason  [address] [facility] [address]  [phone]           DISCHARGE MEDICATIONS:   New Prescriptions    No medications on file        DISCONTINUED MEDICATIONS:   Discontinued Medications    No medications on file              (Please note that portions of this note were completed with a voice recognition program.  Efforts were made to edit the dictations but occasionally words are mis-transcribed.)       Elliott Ratliff MD (electronically signed)             Elliott Ratliff MD  07/12/25 0043     Normal volume, rate, productivity, spontaneity and articulation

## 2025-08-08 ENCOUNTER — INPATIENT (INPATIENT)
Facility: HOSPITAL | Age: 35
LOS: 24 days | Discharge: HOME | DRG: 751 | End: 2025-09-02
Attending: PSYCHIATRY & NEUROLOGY | Admitting: PSYCHIATRY & NEUROLOGY
Payer: MEDICAID

## 2025-08-08 VITALS
HEART RATE: 75 BPM | SYSTOLIC BLOOD PRESSURE: 139 MMHG | TEMPERATURE: 98 F | RESPIRATION RATE: 18 BRPM | WEIGHT: 221.34 LBS | DIASTOLIC BLOOD PRESSURE: 82 MMHG | OXYGEN SATURATION: 98 %

## 2025-08-08 DIAGNOSIS — Z98.890 OTHER SPECIFIED POSTPROCEDURAL STATES: Chronic | ICD-10-CM

## 2025-08-08 LAB
ALBUMIN SERPL ELPH-MCNC: 4 G/DL — SIGNIFICANT CHANGE UP (ref 3.3–5)
ALP SERPL-CCNC: 109 U/L — SIGNIFICANT CHANGE UP (ref 40–120)
ALT FLD-CCNC: 37 U/L — SIGNIFICANT CHANGE UP (ref 12–78)
ANION GAP SERPL CALC-SCNC: 5 MMOL/L — SIGNIFICANT CHANGE UP (ref 5–17)
APAP SERPL-MCNC: <2 UG/ML — LOW (ref 10–30)
AST SERPL-CCNC: 24 U/L — SIGNIFICANT CHANGE UP (ref 15–37)
BASOPHILS # BLD AUTO: 0.06 K/UL — SIGNIFICANT CHANGE UP (ref 0–0.2)
BASOPHILS NFR BLD AUTO: 0.6 % — SIGNIFICANT CHANGE UP (ref 0–2)
BILIRUB SERPL-MCNC: 0.2 MG/DL — SIGNIFICANT CHANGE UP (ref 0.2–1.2)
BUN SERPL-MCNC: 21 MG/DL — SIGNIFICANT CHANGE UP (ref 7–23)
CALCIUM SERPL-MCNC: 9.3 MG/DL — SIGNIFICANT CHANGE UP (ref 8.5–10.1)
CHLORIDE SERPL-SCNC: 107 MMOL/L — SIGNIFICANT CHANGE UP (ref 96–108)
CO2 SERPL-SCNC: 27 MMOL/L — SIGNIFICANT CHANGE UP (ref 22–31)
CREAT SERPL-MCNC: 1.05 MG/DL — SIGNIFICANT CHANGE UP (ref 0.5–1.3)
EGFR: 95 ML/MIN/1.73M2 — SIGNIFICANT CHANGE UP
EGFR: 95 ML/MIN/1.73M2 — SIGNIFICANT CHANGE UP
EOSINOPHIL # BLD AUTO: 0.6 K/UL — HIGH (ref 0–0.5)
EOSINOPHIL NFR BLD AUTO: 6.4 % — HIGH (ref 0–6)
ETHANOL SERPL-MCNC: <10 MG/DL — SIGNIFICANT CHANGE UP (ref 0–10)
GLUCOSE SERPL-MCNC: 94 MG/DL — SIGNIFICANT CHANGE UP (ref 70–99)
HCT VFR BLD CALC: 36.7 % — LOW (ref 39–50)
HGB BLD-MCNC: 12.3 G/DL — LOW (ref 13–17)
IMM GRANULOCYTES # BLD AUTO: 0.04 K/UL — SIGNIFICANT CHANGE UP (ref 0–0.07)
IMM GRANULOCYTES NFR BLD AUTO: 0.4 % — SIGNIFICANT CHANGE UP (ref 0–0.9)
LYMPHOCYTES # BLD AUTO: 2.33 K/UL — SIGNIFICANT CHANGE UP (ref 1–3.3)
LYMPHOCYTES NFR BLD AUTO: 24.8 % — SIGNIFICANT CHANGE UP (ref 13–44)
MCHC RBC-ENTMCNC: 28.7 PG — SIGNIFICANT CHANGE UP (ref 27–34)
MCHC RBC-ENTMCNC: 33.5 G/DL — SIGNIFICANT CHANGE UP (ref 32–36)
MCV RBC AUTO: 85.7 FL — SIGNIFICANT CHANGE UP (ref 80–100)
MONOCYTES # BLD AUTO: 0.64 K/UL — SIGNIFICANT CHANGE UP (ref 0–0.9)
MONOCYTES NFR BLD AUTO: 6.8 % — SIGNIFICANT CHANGE UP (ref 2–14)
NEUTROPHILS # BLD AUTO: 5.73 K/UL — SIGNIFICANT CHANGE UP (ref 1.8–7.4)
NEUTROPHILS NFR BLD AUTO: 61 % — SIGNIFICANT CHANGE UP (ref 43–77)
NRBC # BLD AUTO: 0 K/UL — SIGNIFICANT CHANGE UP (ref 0–0)
NRBC # FLD: 0 K/UL — SIGNIFICANT CHANGE UP (ref 0–0)
NRBC BLD AUTO-RTO: 0 /100 WBCS — SIGNIFICANT CHANGE UP (ref 0–0)
PLATELET # BLD AUTO: 359 K/UL — SIGNIFICANT CHANGE UP (ref 150–400)
PMV BLD: 9.1 FL — SIGNIFICANT CHANGE UP (ref 7–13)
POTASSIUM SERPL-MCNC: 4 MMOL/L — SIGNIFICANT CHANGE UP (ref 3.5–5.3)
POTASSIUM SERPL-SCNC: 4 MMOL/L — SIGNIFICANT CHANGE UP (ref 3.5–5.3)
PROT SERPL-MCNC: 8.1 GM/DL — SIGNIFICANT CHANGE UP (ref 6–8.3)
RBC # BLD: 4.28 M/UL — SIGNIFICANT CHANGE UP (ref 4.2–5.8)
RBC # FLD: 13.8 % — SIGNIFICANT CHANGE UP (ref 10.3–14.5)
SALICYLATES SERPL-MCNC: <1.7 MG/DL — LOW (ref 2.8–20)
SODIUM SERPL-SCNC: 139 MMOL/L — SIGNIFICANT CHANGE UP (ref 135–145)
WBC # BLD: 9.4 K/UL — SIGNIFICANT CHANGE UP (ref 3.8–10.5)
WBC # FLD AUTO: 9.4 K/UL — SIGNIFICANT CHANGE UP (ref 3.8–10.5)

## 2025-08-08 PROCEDURE — 93010 ELECTROCARDIOGRAM REPORT: CPT

## 2025-08-08 PROCEDURE — 99285 EMERGENCY DEPT VISIT HI MDM: CPT

## 2025-08-09 DIAGNOSIS — F33.2 MAJOR DEPRESSIVE DISORDER, RECURRENT SEVERE WITHOUT PSYCHOTIC FEATURES: ICD-10-CM

## 2025-08-09 DIAGNOSIS — R62.50 UNSPECIFIED LACK OF EXPECTED NORMAL PHYSIOLOGICAL DEVELOPMENT IN CHILDHOOD: ICD-10-CM

## 2025-08-09 DIAGNOSIS — Z91.148 PATIENT'S OTHER NONCOMPLIANCE WITH MEDICATION REGIMEN FOR OTHER REASON: ICD-10-CM

## 2025-08-09 DIAGNOSIS — F12.90 CANNABIS USE, UNSPECIFIED, UNCOMPLICATED: ICD-10-CM

## 2025-08-09 PROCEDURE — 36415 COLL VENOUS BLD VENIPUNCTURE: CPT

## 2025-08-09 PROCEDURE — 80061 LIPID PANEL: CPT

## 2025-08-09 PROCEDURE — 99232 SBSQ HOSP IP/OBS MODERATE 35: CPT

## 2025-08-09 PROCEDURE — 99222 1ST HOSP IP/OBS MODERATE 55: CPT

## 2025-08-09 PROCEDURE — 84443 ASSAY THYROID STIM HORMONE: CPT

## 2025-08-09 PROCEDURE — 83036 HEMOGLOBIN GLYCOSYLATED A1C: CPT

## 2025-08-09 RX ORDER — HALOPERIDOL 10 MG/1
5 TABLET ORAL THREE TIMES A DAY
Refills: 0 | Status: DISCONTINUED | OUTPATIENT
Start: 2025-08-09 | End: 2025-08-09

## 2025-08-09 RX ORDER — MAGNESIUM, ALUMINUM HYDROXIDE 200-200 MG
30 TABLET,CHEWABLE ORAL EVERY 6 HOURS
Refills: 0 | Status: DISCONTINUED | OUTPATIENT
Start: 2025-08-09 | End: 2025-09-02

## 2025-08-09 RX ORDER — LORAZEPAM 4 MG/ML
2 VIAL (ML) INJECTION EVERY 8 HOURS
Refills: 0 | Status: DISCONTINUED | OUTPATIENT
Start: 2025-08-09 | End: 2025-08-09

## 2025-08-09 RX ORDER — NICOTINE POLACRILEX 4 MG/1
2 GUM, CHEWING ORAL
Refills: 0 | Status: DISCONTINUED | OUTPATIENT
Start: 2025-08-09 | End: 2025-09-02

## 2025-08-09 RX ORDER — DIPHENHYDRAMINE HCL 12.5MG/5ML
50 ELIXIR ORAL EVERY 6 HOURS
Refills: 0 | Status: DISCONTINUED | OUTPATIENT
Start: 2025-08-09 | End: 2025-09-02

## 2025-08-09 RX ORDER — ACETAMINOPHEN 500 MG/5ML
650 LIQUID (ML) ORAL EVERY 6 HOURS
Refills: 0 | Status: DISCONTINUED | OUTPATIENT
Start: 2025-08-09 | End: 2025-09-02

## 2025-08-09 RX ORDER — CYST/ALA/Q10/PHOS.SER/DHA/BROC 100-20-50
1 POWDER (GRAM) ORAL DAILY
Refills: 0 | Status: DISCONTINUED | OUTPATIENT
Start: 2025-08-09 | End: 2025-09-02

## 2025-08-09 RX ORDER — LORAZEPAM 4 MG/ML
1 VIAL (ML) INJECTION EVERY 6 HOURS
Refills: 0 | Status: DISCONTINUED | OUTPATIENT
Start: 2025-08-09 | End: 2025-08-12

## 2025-08-09 RX ORDER — HALOPERIDOL 10 MG/1
5 TABLET ORAL EVERY 6 HOURS
Refills: 0 | Status: DISCONTINUED | OUTPATIENT
Start: 2025-08-09 | End: 2025-09-02

## 2025-08-09 RX ORDER — MAGNESIUM HYDROXIDE 400 MG/5ML
30 SUSPENSION ORAL DAILY
Refills: 0 | Status: DISCONTINUED | OUTPATIENT
Start: 2025-08-09 | End: 2025-09-02

## 2025-08-09 RX ORDER — TRAZODONE HCL 100 MG
50 TABLET ORAL AT BEDTIME
Refills: 0 | Status: DISCONTINUED | OUTPATIENT
Start: 2025-08-09 | End: 2025-08-09

## 2025-08-09 RX ADMIN — Medication 2 MILLIGRAM(S): at 04:42

## 2025-08-09 RX ADMIN — Medication 1 MILLIGRAM(S): at 22:35

## 2025-08-10 LAB
A1C WITH ESTIMATED AVERAGE GLUCOSE RESULT: 5.1 % — SIGNIFICANT CHANGE UP (ref 4–5.6)
CHOLEST SERPL-MCNC: 183 MG/DL — SIGNIFICANT CHANGE UP
ESTIMATED AVERAGE GLUCOSE: 100 MG/DL — SIGNIFICANT CHANGE UP (ref 68–114)
HDLC SERPL-MCNC: 49 MG/DL — SIGNIFICANT CHANGE UP
LDLC SERPL-MCNC: 119 MG/DL — HIGH
LIPID PNL WITH DIRECT LDL SERPL: 119 MG/DL — HIGH
NONHDLC SERPL-MCNC: 134 MG/DL — HIGH
TRIGL SERPL-MCNC: 79 MG/DL — SIGNIFICANT CHANGE UP
TSH SERPL-MCNC: 2.34 UU/ML — SIGNIFICANT CHANGE UP (ref 0.34–4.82)

## 2025-08-10 PROCEDURE — 99232 SBSQ HOSP IP/OBS MODERATE 35: CPT

## 2025-08-10 RX ORDER — LISDEXAMFETAMINE DIMESYLATE 20 MG/1
50 TABLET, CHEWABLE ORAL
Refills: 0 | Status: DISCONTINUED | OUTPATIENT
Start: 2025-08-10 | End: 2025-08-17

## 2025-08-10 RX ORDER — MELATONIN 5 MG
5 TABLET ORAL AT BEDTIME
Refills: 0 | Status: DISCONTINUED | OUTPATIENT
Start: 2025-08-10 | End: 2025-08-12

## 2025-08-10 RX ORDER — BUPROPION HYDROBROMIDE 522 MG/1
150 TABLET, EXTENDED RELEASE ORAL DAILY
Refills: 0 | Status: DISCONTINUED | OUTPATIENT
Start: 2025-08-10 | End: 2025-08-12

## 2025-08-10 RX ORDER — LAMOTRIGINE 150 MG/1
50 TABLET ORAL DAILY
Refills: 0 | Status: DISCONTINUED | OUTPATIENT
Start: 2025-08-10 | End: 2025-08-15

## 2025-08-10 RX ADMIN — Medication 1 TABLET(S): at 12:50

## 2025-08-10 RX ADMIN — Medication 5 MILLIGRAM(S): at 20:49

## 2025-08-11 PROCEDURE — 99232 SBSQ HOSP IP/OBS MODERATE 35: CPT

## 2025-08-11 RX ADMIN — LISDEXAMFETAMINE DIMESYLATE 50 MILLIGRAM(S): 20 TABLET, CHEWABLE ORAL at 10:18

## 2025-08-11 RX ADMIN — Medication 5 MILLIGRAM(S): at 21:45

## 2025-08-11 RX ADMIN — LAMOTRIGINE 50 MILLIGRAM(S): 150 TABLET ORAL at 10:18

## 2025-08-11 RX ADMIN — Medication 1 TABLET(S): at 10:18

## 2025-08-11 RX ADMIN — BUPROPION HYDROBROMIDE 150 MILLIGRAM(S): 522 TABLET, EXTENDED RELEASE ORAL at 10:17

## 2025-08-12 PROCEDURE — 99232 SBSQ HOSP IP/OBS MODERATE 35: CPT

## 2025-08-12 RX ORDER — LORAZEPAM 4 MG/ML
0.5 VIAL (ML) INJECTION EVERY 6 HOURS
Refills: 0 | Status: DISCONTINUED | OUTPATIENT
Start: 2025-08-12 | End: 2025-08-17

## 2025-08-12 RX ORDER — MIRTAZAPINE 30 MG/1
7.5 TABLET, FILM COATED ORAL AT BEDTIME
Refills: 0 | Status: DISCONTINUED | OUTPATIENT
Start: 2025-08-12 | End: 2025-08-14

## 2025-08-12 RX ORDER — MELATONIN 5 MG
5 TABLET ORAL AT BEDTIME
Refills: 0 | Status: DISCONTINUED | OUTPATIENT
Start: 2025-08-12 | End: 2025-08-14

## 2025-08-12 RX ORDER — BUPROPION HYDROBROMIDE 522 MG/1
300 TABLET, EXTENDED RELEASE ORAL DAILY
Refills: 0 | Status: DISCONTINUED | OUTPATIENT
Start: 2025-08-12 | End: 2025-08-19

## 2025-08-12 RX ADMIN — Medication 1 MILLIGRAM(S): at 01:06

## 2025-08-12 RX ADMIN — BUPROPION HYDROBROMIDE 150 MILLIGRAM(S): 522 TABLET, EXTENDED RELEASE ORAL at 10:41

## 2025-08-12 RX ADMIN — MIRTAZAPINE 7.5 MILLIGRAM(S): 30 TABLET, FILM COATED ORAL at 21:31

## 2025-08-12 RX ADMIN — LAMOTRIGINE 50 MILLIGRAM(S): 150 TABLET ORAL at 10:42

## 2025-08-12 RX ADMIN — Medication 5 MILLIGRAM(S): at 21:30

## 2025-08-12 RX ADMIN — LISDEXAMFETAMINE DIMESYLATE 50 MILLIGRAM(S): 20 TABLET, CHEWABLE ORAL at 10:41

## 2025-08-12 RX ADMIN — Medication 0.5 MILLIGRAM(S): at 21:46

## 2025-08-12 RX ADMIN — Medication 1 TABLET(S): at 10:41

## 2025-08-13 PROCEDURE — 99232 SBSQ HOSP IP/OBS MODERATE 35: CPT

## 2025-08-13 RX ADMIN — Medication 0.5 MILLIGRAM(S): at 21:30

## 2025-08-13 RX ADMIN — Medication 5 MILLIGRAM(S): at 21:31

## 2025-08-13 RX ADMIN — Medication 50 MILLIGRAM(S): at 21:30

## 2025-08-13 RX ADMIN — LAMOTRIGINE 50 MILLIGRAM(S): 150 TABLET ORAL at 09:32

## 2025-08-13 RX ADMIN — BUPROPION HYDROBROMIDE 300 MILLIGRAM(S): 522 TABLET, EXTENDED RELEASE ORAL at 09:31

## 2025-08-13 RX ADMIN — Medication 1 TABLET(S): at 09:31

## 2025-08-13 RX ADMIN — LISDEXAMFETAMINE DIMESYLATE 50 MILLIGRAM(S): 20 TABLET, CHEWABLE ORAL at 09:31

## 2025-08-14 PROCEDURE — 99232 SBSQ HOSP IP/OBS MODERATE 35: CPT

## 2025-08-14 RX ORDER — DIPHENHYDRAMINE HCL 12.5MG/5ML
50 ELIXIR ORAL AT BEDTIME
Refills: 0 | Status: DISCONTINUED | OUTPATIENT
Start: 2025-08-14 | End: 2025-09-02

## 2025-08-14 RX ORDER — MELATONIN 5 MG
10 TABLET ORAL AT BEDTIME
Refills: 0 | Status: DISCONTINUED | OUTPATIENT
Start: 2025-08-14 | End: 2025-09-02

## 2025-08-14 RX ORDER — DIPHENHYDRAMINE HCL 12.5MG/5ML
50 ELIXIR ORAL AT BEDTIME
Refills: 0 | Status: DISCONTINUED | OUTPATIENT
Start: 2025-08-14 | End: 2025-08-14

## 2025-08-14 RX ADMIN — Medication 1 TABLET(S): at 09:30

## 2025-08-14 RX ADMIN — Medication 10 MILLIGRAM(S): at 20:38

## 2025-08-14 RX ADMIN — LAMOTRIGINE 50 MILLIGRAM(S): 150 TABLET ORAL at 09:30

## 2025-08-14 RX ADMIN — Medication 50 MILLIGRAM(S): at 20:38

## 2025-08-14 RX ADMIN — LISDEXAMFETAMINE DIMESYLATE 50 MILLIGRAM(S): 20 TABLET, CHEWABLE ORAL at 09:29

## 2025-08-14 RX ADMIN — BUPROPION HYDROBROMIDE 300 MILLIGRAM(S): 522 TABLET, EXTENDED RELEASE ORAL at 09:30

## 2025-08-15 PROCEDURE — 99232 SBSQ HOSP IP/OBS MODERATE 35: CPT

## 2025-08-15 RX ORDER — LAMOTRIGINE 150 MG/1
100 TABLET ORAL DAILY
Refills: 0 | Status: DISCONTINUED | OUTPATIENT
Start: 2025-08-15 | End: 2025-09-02

## 2025-08-15 RX ADMIN — Medication 10 MILLIGRAM(S): at 21:14

## 2025-08-15 RX ADMIN — LISDEXAMFETAMINE DIMESYLATE 50 MILLIGRAM(S): 20 TABLET, CHEWABLE ORAL at 09:29

## 2025-08-15 RX ADMIN — Medication 50 MILLIGRAM(S): at 21:16

## 2025-08-15 RX ADMIN — BUPROPION HYDROBROMIDE 300 MILLIGRAM(S): 522 TABLET, EXTENDED RELEASE ORAL at 09:29

## 2025-08-15 RX ADMIN — Medication 0.5 MILLIGRAM(S): at 01:57

## 2025-08-15 RX ADMIN — Medication 1 TABLET(S): at 09:29

## 2025-08-15 RX ADMIN — LAMOTRIGINE 50 MILLIGRAM(S): 150 TABLET ORAL at 09:29

## 2025-08-16 RX ADMIN — Medication 1 TABLET(S): at 09:14

## 2025-08-16 RX ADMIN — BUPROPION HYDROBROMIDE 300 MILLIGRAM(S): 522 TABLET, EXTENDED RELEASE ORAL at 09:14

## 2025-08-16 RX ADMIN — Medication 400 UNIT(S): at 09:13

## 2025-08-16 RX ADMIN — LISDEXAMFETAMINE DIMESYLATE 50 MILLIGRAM(S): 20 TABLET, CHEWABLE ORAL at 09:13

## 2025-08-16 RX ADMIN — Medication 10 MILLIGRAM(S): at 20:53

## 2025-08-16 RX ADMIN — LAMOTRIGINE 100 MILLIGRAM(S): 150 TABLET ORAL at 09:13

## 2025-08-16 RX ADMIN — Medication 50 MILLIGRAM(S): at 20:53

## 2025-08-17 RX ADMIN — BUPROPION HYDROBROMIDE 300 MILLIGRAM(S): 522 TABLET, EXTENDED RELEASE ORAL at 08:55

## 2025-08-17 RX ADMIN — LAMOTRIGINE 100 MILLIGRAM(S): 150 TABLET ORAL at 08:56

## 2025-08-17 RX ADMIN — Medication 1 TABLET(S): at 08:56

## 2025-08-17 RX ADMIN — LISDEXAMFETAMINE DIMESYLATE 50 MILLIGRAM(S): 20 TABLET, CHEWABLE ORAL at 08:56

## 2025-08-17 RX ADMIN — Medication 400 UNIT(S): at 08:55

## 2025-08-17 RX ADMIN — Medication 50 MILLIGRAM(S): at 21:03

## 2025-08-17 RX ADMIN — Medication 10 MILLIGRAM(S): at 21:03

## 2025-08-17 RX ADMIN — Medication 0.5 MILLIGRAM(S): at 01:46

## 2025-08-17 RX ADMIN — Medication 0.5 MILLIGRAM(S): at 21:03

## 2025-08-18 PROCEDURE — 99232 SBSQ HOSP IP/OBS MODERATE 35: CPT

## 2025-08-18 RX ORDER — LISDEXAMFETAMINE DIMESYLATE 20 MG/1
50 TABLET, CHEWABLE ORAL
Refills: 0 | Status: DISCONTINUED | OUTPATIENT
Start: 2025-08-19 | End: 2025-08-20

## 2025-08-18 RX ADMIN — Medication 1 TABLET(S): at 09:32

## 2025-08-18 RX ADMIN — Medication 50 MILLIGRAM(S): at 20:22

## 2025-08-18 RX ADMIN — BUPROPION HYDROBROMIDE 300 MILLIGRAM(S): 522 TABLET, EXTENDED RELEASE ORAL at 09:32

## 2025-08-18 RX ADMIN — Medication 400 UNIT(S): at 09:32

## 2025-08-18 RX ADMIN — LAMOTRIGINE 100 MILLIGRAM(S): 150 TABLET ORAL at 09:32

## 2025-08-18 RX ADMIN — Medication 10 MILLIGRAM(S): at 20:22

## 2025-08-19 PROCEDURE — 99232 SBSQ HOSP IP/OBS MODERATE 35: CPT

## 2025-08-19 RX ORDER — BUPROPION HYDROBROMIDE 522 MG/1
450 TABLET, EXTENDED RELEASE ORAL DAILY
Refills: 0 | Status: DISCONTINUED | OUTPATIENT
Start: 2025-08-19 | End: 2025-09-02

## 2025-08-19 RX ORDER — AMMONIUM LACTATE 12 %
1 LOTION (ML) TOPICAL
Refills: 0 | Status: DISCONTINUED | OUTPATIENT
Start: 2025-08-19 | End: 2025-09-02

## 2025-08-19 RX ADMIN — Medication 1 TABLET(S): at 08:50

## 2025-08-19 RX ADMIN — LAMOTRIGINE 100 MILLIGRAM(S): 150 TABLET ORAL at 08:49

## 2025-08-19 RX ADMIN — BUPROPION HYDROBROMIDE 300 MILLIGRAM(S): 522 TABLET, EXTENDED RELEASE ORAL at 08:50

## 2025-08-19 RX ADMIN — Medication 50 MILLIGRAM(S): at 21:31

## 2025-08-19 RX ADMIN — LISDEXAMFETAMINE DIMESYLATE 50 MILLIGRAM(S): 20 TABLET, CHEWABLE ORAL at 08:50

## 2025-08-19 RX ADMIN — Medication 1 APPLICATION(S): at 21:31

## 2025-08-19 RX ADMIN — Medication 10 MILLIGRAM(S): at 21:31

## 2025-08-19 RX ADMIN — Medication 400 UNIT(S): at 08:49

## 2025-08-20 PROCEDURE — 99232 SBSQ HOSP IP/OBS MODERATE 35: CPT

## 2025-08-20 RX ORDER — LISDEXAMFETAMINE DIMESYLATE 20 MG/1
30 TABLET, CHEWABLE ORAL
Refills: 0 | Status: DISCONTINUED | OUTPATIENT
Start: 2025-08-21 | End: 2025-08-22

## 2025-08-20 RX ADMIN — Medication 1 TABLET(S): at 10:00

## 2025-08-20 RX ADMIN — Medication 10 MILLIGRAM(S): at 21:11

## 2025-08-20 RX ADMIN — LISDEXAMFETAMINE DIMESYLATE 50 MILLIGRAM(S): 20 TABLET, CHEWABLE ORAL at 09:59

## 2025-08-20 RX ADMIN — Medication 400 UNIT(S): at 10:00

## 2025-08-20 RX ADMIN — LAMOTRIGINE 100 MILLIGRAM(S): 150 TABLET ORAL at 10:00

## 2025-08-20 RX ADMIN — Medication 1 APPLICATION(S): at 21:12

## 2025-08-20 RX ADMIN — Medication 50 MILLIGRAM(S): at 21:11

## 2025-08-20 RX ADMIN — BUPROPION HYDROBROMIDE 450 MILLIGRAM(S): 522 TABLET, EXTENDED RELEASE ORAL at 10:00

## 2025-08-21 PROCEDURE — 99232 SBSQ HOSP IP/OBS MODERATE 35: CPT

## 2025-08-21 RX ADMIN — Medication 1 TABLET(S): at 10:15

## 2025-08-21 RX ADMIN — LAMOTRIGINE 100 MILLIGRAM(S): 150 TABLET ORAL at 10:15

## 2025-08-21 RX ADMIN — Medication 400 UNIT(S): at 10:16

## 2025-08-21 RX ADMIN — Medication 50 MILLIGRAM(S): at 20:40

## 2025-08-21 RX ADMIN — BUPROPION HYDROBROMIDE 450 MILLIGRAM(S): 522 TABLET, EXTENDED RELEASE ORAL at 10:15

## 2025-08-21 RX ADMIN — Medication 1 APPLICATION(S): at 10:16

## 2025-08-21 RX ADMIN — Medication 10 MILLIGRAM(S): at 20:41

## 2025-08-21 RX ADMIN — LISDEXAMFETAMINE DIMESYLATE 30 MILLIGRAM(S): 20 TABLET, CHEWABLE ORAL at 10:15

## 2025-08-22 DIAGNOSIS — F10.10 ALCOHOL ABUSE, UNCOMPLICATED: ICD-10-CM

## 2025-08-22 PROCEDURE — 99232 SBSQ HOSP IP/OBS MODERATE 35: CPT

## 2025-08-22 RX ADMIN — LAMOTRIGINE 100 MILLIGRAM(S): 150 TABLET ORAL at 09:02

## 2025-08-22 RX ADMIN — LISDEXAMFETAMINE DIMESYLATE 30 MILLIGRAM(S): 20 TABLET, CHEWABLE ORAL at 09:01

## 2025-08-22 RX ADMIN — BUPROPION HYDROBROMIDE 450 MILLIGRAM(S): 522 TABLET, EXTENDED RELEASE ORAL at 09:02

## 2025-08-22 RX ADMIN — Medication 10 MILLIGRAM(S): at 21:13

## 2025-08-22 RX ADMIN — Medication 1 TABLET(S): at 09:02

## 2025-08-22 RX ADMIN — Medication 50 MILLIGRAM(S): at 21:13

## 2025-08-22 RX ADMIN — Medication 400 UNIT(S): at 09:01

## 2025-08-23 RX ADMIN — Medication 10 MILLIGRAM(S): at 20:52

## 2025-08-23 RX ADMIN — Medication 1 TABLET(S): at 09:44

## 2025-08-23 RX ADMIN — Medication 400 UNIT(S): at 09:44

## 2025-08-23 RX ADMIN — Medication 50 MILLIGRAM(S): at 20:52

## 2025-08-23 RX ADMIN — BUPROPION HYDROBROMIDE 450 MILLIGRAM(S): 522 TABLET, EXTENDED RELEASE ORAL at 09:45

## 2025-08-23 RX ADMIN — Medication 1 APPLICATION(S): at 13:00

## 2025-08-23 RX ADMIN — LAMOTRIGINE 100 MILLIGRAM(S): 150 TABLET ORAL at 09:45

## 2025-08-24 RX ADMIN — LAMOTRIGINE 100 MILLIGRAM(S): 150 TABLET ORAL at 09:47

## 2025-08-24 RX ADMIN — BUPROPION HYDROBROMIDE 450 MILLIGRAM(S): 522 TABLET, EXTENDED RELEASE ORAL at 09:47

## 2025-08-24 RX ADMIN — Medication 1 APPLICATION(S): at 09:00

## 2025-08-24 RX ADMIN — Medication 10 MILLIGRAM(S): at 20:25

## 2025-08-24 RX ADMIN — Medication 400 UNIT(S): at 09:47

## 2025-08-24 RX ADMIN — Medication 50 MILLIGRAM(S): at 20:25

## 2025-08-24 RX ADMIN — Medication 1 TABLET(S): at 09:47

## 2025-08-25 PROCEDURE — 99232 SBSQ HOSP IP/OBS MODERATE 35: CPT

## 2025-08-25 RX ADMIN — LAMOTRIGINE 100 MILLIGRAM(S): 150 TABLET ORAL at 09:07

## 2025-08-25 RX ADMIN — Medication 1 TABLET(S): at 09:07

## 2025-08-25 RX ADMIN — Medication 10 MILLIGRAM(S): at 21:16

## 2025-08-25 RX ADMIN — Medication 1 APPLICATION(S): at 21:15

## 2025-08-25 RX ADMIN — Medication 400 UNIT(S): at 09:07

## 2025-08-25 RX ADMIN — Medication 50 MILLIGRAM(S): at 21:16

## 2025-08-25 RX ADMIN — Medication 1 APPLICATION(S): at 09:09

## 2025-08-25 RX ADMIN — BUPROPION HYDROBROMIDE 450 MILLIGRAM(S): 522 TABLET, EXTENDED RELEASE ORAL at 09:07

## 2025-08-26 PROCEDURE — 99232 SBSQ HOSP IP/OBS MODERATE 35: CPT

## 2025-08-26 RX ADMIN — Medication 50 MILLIGRAM(S): at 20:19

## 2025-08-26 RX ADMIN — BUPROPION HYDROBROMIDE 450 MILLIGRAM(S): 522 TABLET, EXTENDED RELEASE ORAL at 09:18

## 2025-08-26 RX ADMIN — Medication 10 MILLIGRAM(S): at 20:19

## 2025-08-26 RX ADMIN — LAMOTRIGINE 100 MILLIGRAM(S): 150 TABLET ORAL at 09:18

## 2025-08-26 RX ADMIN — Medication 1 TABLET(S): at 09:18

## 2025-08-26 RX ADMIN — Medication 1 APPLICATION(S): at 09:19

## 2025-08-26 RX ADMIN — Medication 400 UNIT(S): at 09:18

## 2025-08-27 PROCEDURE — 99232 SBSQ HOSP IP/OBS MODERATE 35: CPT

## 2025-08-27 RX ADMIN — LAMOTRIGINE 100 MILLIGRAM(S): 150 TABLET ORAL at 09:47

## 2025-08-27 RX ADMIN — Medication 1 TABLET(S): at 09:47

## 2025-08-27 RX ADMIN — Medication 10 MILLIGRAM(S): at 20:22

## 2025-08-27 RX ADMIN — BUPROPION HYDROBROMIDE 450 MILLIGRAM(S): 522 TABLET, EXTENDED RELEASE ORAL at 09:46

## 2025-08-27 RX ADMIN — Medication 50 MILLIGRAM(S): at 20:22

## 2025-08-27 RX ADMIN — Medication 400 UNIT(S): at 09:48

## 2025-08-28 PROCEDURE — 99232 SBSQ HOSP IP/OBS MODERATE 35: CPT

## 2025-08-28 RX ADMIN — Medication 10 MILLIGRAM(S): at 21:18

## 2025-08-28 RX ADMIN — Medication 400 UNIT(S): at 09:37

## 2025-08-28 RX ADMIN — Medication 50 MILLIGRAM(S): at 21:18

## 2025-08-28 RX ADMIN — Medication 1 TABLET(S): at 09:37

## 2025-08-28 RX ADMIN — BUPROPION HYDROBROMIDE 450 MILLIGRAM(S): 522 TABLET, EXTENDED RELEASE ORAL at 09:37

## 2025-08-28 RX ADMIN — Medication 1 APPLICATION(S): at 21:18

## 2025-08-28 RX ADMIN — LAMOTRIGINE 100 MILLIGRAM(S): 150 TABLET ORAL at 09:37

## 2025-08-29 PROCEDURE — 99232 SBSQ HOSP IP/OBS MODERATE 35: CPT

## 2025-08-29 RX ADMIN — Medication 400 UNIT(S): at 08:53

## 2025-08-29 RX ADMIN — Medication 1 TABLET(S): at 08:53

## 2025-08-29 RX ADMIN — Medication 1 APPLICATION(S): at 08:54

## 2025-08-29 RX ADMIN — MAGNESIUM HYDROXIDE 30 MILLILITER(S): 400 SUSPENSION ORAL at 20:49

## 2025-08-29 RX ADMIN — Medication 1 APPLICATION(S): at 20:46

## 2025-08-29 RX ADMIN — BUPROPION HYDROBROMIDE 450 MILLIGRAM(S): 522 TABLET, EXTENDED RELEASE ORAL at 08:53

## 2025-08-29 RX ADMIN — LAMOTRIGINE 100 MILLIGRAM(S): 150 TABLET ORAL at 08:53

## 2025-08-29 RX ADMIN — Medication 10 MILLIGRAM(S): at 20:47

## 2025-08-29 RX ADMIN — Medication 50 MILLIGRAM(S): at 20:47

## 2025-08-30 RX ADMIN — Medication 1 APPLICATION(S): at 10:07

## 2025-08-30 RX ADMIN — Medication 50 MILLIGRAM(S): at 20:40

## 2025-08-30 RX ADMIN — Medication 1 TABLET(S): at 10:00

## 2025-08-30 RX ADMIN — LAMOTRIGINE 100 MILLIGRAM(S): 150 TABLET ORAL at 10:00

## 2025-08-30 RX ADMIN — Medication 10 MILLIGRAM(S): at 20:40

## 2025-08-30 RX ADMIN — BUPROPION HYDROBROMIDE 450 MILLIGRAM(S): 522 TABLET, EXTENDED RELEASE ORAL at 10:00

## 2025-08-30 RX ADMIN — Medication 400 UNIT(S): at 10:00

## 2025-08-31 PROCEDURE — 99232 SBSQ HOSP IP/OBS MODERATE 35: CPT

## 2025-08-31 RX ORDER — AMMONIUM LACTATE 12 %
1 LOTION (ML) TOPICAL
Qty: 0 | Refills: 0 | DISCHARGE
Start: 2025-08-31

## 2025-08-31 RX ORDER — BUPROPION HYDROBROMIDE 522 MG/1
1 TABLET, EXTENDED RELEASE ORAL
Qty: 0 | Refills: 0 | DISCHARGE
Start: 2025-08-31

## 2025-08-31 RX ORDER — MAGNESIUM HYDROXIDE 400 MG/5ML
30 SUSPENSION ORAL
Qty: 0 | Refills: 0 | DISCHARGE
Start: 2025-08-31

## 2025-08-31 RX ORDER — CYST/ALA/Q10/PHOS.SER/DHA/BROC 100-20-50
1 POWDER (GRAM) ORAL
Qty: 0 | Refills: 0 | DISCHARGE
Start: 2025-08-31

## 2025-08-31 RX ORDER — MELATONIN 5 MG
1 TABLET ORAL
Qty: 0 | Refills: 0 | DISCHARGE
Start: 2025-08-31

## 2025-08-31 RX ORDER — MAGNESIUM, ALUMINUM HYDROXIDE 200-200 MG
30 TABLET,CHEWABLE ORAL
Qty: 0 | Refills: 0 | DISCHARGE
Start: 2025-08-31

## 2025-08-31 RX ORDER — ACETAMINOPHEN 500 MG/5ML
2 LIQUID (ML) ORAL
Qty: 0 | Refills: 0 | DISCHARGE
Start: 2025-08-31

## 2025-08-31 RX ORDER — LAMOTRIGINE 150 MG/1
1 TABLET ORAL
Qty: 0 | Refills: 0 | DISCHARGE
Start: 2025-08-31

## 2025-08-31 RX ORDER — DIPHENHYDRAMINE HCL 12.5MG/5ML
1 ELIXIR ORAL
Qty: 0 | Refills: 0 | DISCHARGE
Start: 2025-08-31

## 2025-08-31 RX ADMIN — Medication 10 MILLIGRAM(S): at 20:39

## 2025-08-31 RX ADMIN — Medication 50 MILLIGRAM(S): at 20:39

## 2025-08-31 RX ADMIN — LAMOTRIGINE 100 MILLIGRAM(S): 150 TABLET ORAL at 10:26

## 2025-08-31 RX ADMIN — Medication 1 APPLICATION(S): at 10:27

## 2025-08-31 RX ADMIN — BUPROPION HYDROBROMIDE 450 MILLIGRAM(S): 522 TABLET, EXTENDED RELEASE ORAL at 10:26

## 2025-08-31 RX ADMIN — Medication 1 TABLET(S): at 10:26

## 2025-08-31 RX ADMIN — Medication 400 UNIT(S): at 10:27

## 2025-09-01 RX ADMIN — Medication 1 TABLET(S): at 09:29

## 2025-09-01 RX ADMIN — Medication 400 UNIT(S): at 09:29

## 2025-09-01 RX ADMIN — Medication 50 MILLIGRAM(S): at 22:00

## 2025-09-01 RX ADMIN — Medication 10 MILLIGRAM(S): at 22:00

## 2025-09-01 RX ADMIN — BUPROPION HYDROBROMIDE 450 MILLIGRAM(S): 522 TABLET, EXTENDED RELEASE ORAL at 09:29

## 2025-09-01 RX ADMIN — LAMOTRIGINE 100 MILLIGRAM(S): 150 TABLET ORAL at 09:29

## 2025-09-02 VITALS — RESPIRATION RATE: 16 BRPM | OXYGEN SATURATION: 100 % | TEMPERATURE: 98 F

## 2025-09-02 PROCEDURE — 99239 HOSP IP/OBS DSCHRG MGMT >30: CPT

## 2025-09-02 RX ADMIN — Medication 400 UNIT(S): at 09:08

## 2025-09-02 RX ADMIN — LAMOTRIGINE 100 MILLIGRAM(S): 150 TABLET ORAL at 09:07

## 2025-09-02 RX ADMIN — Medication 1 TABLET(S): at 09:07

## 2025-09-02 RX ADMIN — BUPROPION HYDROBROMIDE 450 MILLIGRAM(S): 522 TABLET, EXTENDED RELEASE ORAL at 09:07

## 2025-09-06 DIAGNOSIS — F33.2 MAJOR DEPRESSIVE DISORDER, RECURRENT SEVERE WITHOUT PSYCHOTIC FEATURES: ICD-10-CM

## 2025-09-06 DIAGNOSIS — R45.851 SUICIDAL IDEATIONS: ICD-10-CM

## 2025-09-06 DIAGNOSIS — Z91.199 PATIENT'S NONCOMPLIANCE WITH OTHER MEDICAL TREATMENT AND REGIMEN DUE TO UNSPECIFIED REASON: ICD-10-CM

## 2025-09-06 DIAGNOSIS — F10.10 ALCOHOL ABUSE, UNCOMPLICATED: ICD-10-CM

## 2025-09-06 DIAGNOSIS — F41.9 ANXIETY DISORDER, UNSPECIFIED: ICD-10-CM

## 2025-09-06 DIAGNOSIS — D64.9 ANEMIA, UNSPECIFIED: ICD-10-CM

## 2025-09-06 DIAGNOSIS — F90.9 ATTENTION-DEFICIT HYPERACTIVITY DISORDER, UNSPECIFIED TYPE: ICD-10-CM

## 2025-09-06 DIAGNOSIS — R62.50 UNSPECIFIED LACK OF EXPECTED NORMAL PHYSIOLOGICAL DEVELOPMENT IN CHILDHOOD: ICD-10-CM

## 2025-09-06 DIAGNOSIS — F12.90 CANNABIS USE, UNSPECIFIED, UNCOMPLICATED: ICD-10-CM
